# Patient Record
Sex: FEMALE | Race: ASIAN | NOT HISPANIC OR LATINO | Employment: FULL TIME | URBAN - NONMETROPOLITAN AREA
[De-identification: names, ages, dates, MRNs, and addresses within clinical notes are randomized per-mention and may not be internally consistent; named-entity substitution may affect disease eponyms.]

---

## 2017-02-27 ENCOUNTER — HOSPITAL ENCOUNTER (OUTPATIENT)
Dept: LAB | Facility: MEDICAL CENTER | Age: 52
End: 2017-02-27
Attending: INTERNAL MEDICINE
Payer: COMMERCIAL

## 2017-02-27 LAB
ALBUMIN SERPL BCP-MCNC: 3.9 G/DL (ref 3.2–4.9)
ALP SERPL-CCNC: 87 U/L (ref 30–99)
ALT SERPL-CCNC: 15 U/L (ref 2–50)
AST SERPL-CCNC: 18 U/L (ref 12–45)
BASOPHILS # BLD AUTO: 0.05 K/UL (ref 0–0.12)
BASOPHILS NFR BLD AUTO: 0.8 % (ref 0–1.8)
BILIRUB CONJ SERPL-MCNC: 0.1 MG/DL (ref 0.1–0.5)
BILIRUB INDIRECT SERPL-MCNC: 0.3 MG/DL (ref 0–1)
BILIRUB SERPL-MCNC: 0.4 MG/DL (ref 0.1–1.5)
EOSINOPHIL # BLD: 0.12 K/UL (ref 0–0.51)
EOSINOPHIL NFR BLD AUTO: 1.9 % (ref 0–6.9)
ERYTHROCYTE [DISTWIDTH] IN BLOOD BY AUTOMATED COUNT: 40.6 FL (ref 35.9–50)
HCT VFR BLD AUTO: 41.9 % (ref 37–47)
HGB BLD-MCNC: 14.4 G/DL (ref 12–16)
IMM GRANULOCYTES # BLD AUTO: 0.02 K/UL (ref 0–0.11)
IMM GRANULOCYTES NFR BLD AUTO: 0.3 % (ref 0–0.9)
INR PPP: 0.98 (ref 0.87–1.13)
LYMPHOCYTES # BLD: 2.83 K/UL (ref 1–4.8)
LYMPHOCYTES NFR BLD AUTO: 45.2 % (ref 22–41)
MCH RBC QN AUTO: 32.4 PG (ref 27–33)
MCHC RBC AUTO-ENTMCNC: 34.4 G/DL (ref 33.6–35)
MCV RBC AUTO: 94.4 FL (ref 81.4–97.8)
MONOCYTES # BLD: 0.34 K/UL (ref 0–0.85)
MONOCYTES NFR BLD AUTO: 5.4 % (ref 0–13.4)
NEUTROPHILS # BLD: 2.9 K/UL (ref 2–7.15)
NEUTROPHILS NFR BLD AUTO: 46.4 % (ref 44–72)
NRBC # BLD AUTO: 0 K/UL
NRBC BLD-RTO: 0 /100 WBC
PLATELET # BLD AUTO: 244 K/UL (ref 164–446)
PMV BLD AUTO: 10.1 FL (ref 9–12.9)
PROT SERPL-MCNC: 6.6 G/DL (ref 6–8.2)
PROTHROMBIN TIME: 13.3 SEC (ref 12–14.6)
RBC # BLD AUTO: 4.44 M/UL (ref 4.2–5.4)
WBC # BLD AUTO: 6.3 K/UL (ref 4.8–10.8)

## 2017-02-27 PROCEDURE — 85610 PROTHROMBIN TIME: CPT

## 2017-02-27 PROCEDURE — 36415 COLL VENOUS BLD VENIPUNCTURE: CPT

## 2017-02-27 PROCEDURE — 85025 COMPLETE CBC W/AUTO DIFF WBC: CPT

## 2017-02-27 PROCEDURE — 80076 HEPATIC FUNCTION PANEL: CPT

## 2017-03-09 ENCOUNTER — OFFICE VISIT (OUTPATIENT)
Dept: MEDICAL GROUP | Facility: PHYSICIAN GROUP | Age: 52
End: 2017-03-09
Payer: COMMERCIAL

## 2017-03-09 VITALS
RESPIRATION RATE: 16 BRPM | OXYGEN SATURATION: 98 % | HEART RATE: 67 BPM | DIASTOLIC BLOOD PRESSURE: 82 MMHG | TEMPERATURE: 98.8 F | WEIGHT: 115 LBS | SYSTOLIC BLOOD PRESSURE: 116 MMHG | BODY MASS INDEX: 24.81 KG/M2 | HEIGHT: 57 IN

## 2017-03-09 DIAGNOSIS — R16.0 LIVER MASS: ICD-10-CM

## 2017-03-09 DIAGNOSIS — R74.01 TRANSAMINITIS: ICD-10-CM

## 2017-03-09 DIAGNOSIS — N95.2 ATROPHIC VAGINITIS: ICD-10-CM

## 2017-03-09 DIAGNOSIS — N92.6 IRREGULAR MENSES: Chronic | ICD-10-CM

## 2017-03-09 DIAGNOSIS — Z87.39 HISTORY OF RHABDOMYOLYSIS: ICD-10-CM

## 2017-03-09 DIAGNOSIS — Z78.0 MENOPAUSE: ICD-10-CM

## 2017-03-09 PROCEDURE — 99214 OFFICE O/P EST MOD 30 MIN: CPT | Performed by: INTERNAL MEDICINE

## 2017-03-09 ASSESSMENT — PAIN SCALES - GENERAL: PAINLEVEL: NO PAIN

## 2017-03-09 NOTE — MR AVS SNAPSHOT
"        Jenna Real   3/9/2017 2:40 PM   Office Visit   MRN: [id number]    Department:  [facility]   Dept Phone:  [phone]    Description:  Female : 1965   Provider:  Hazel COFFEY M.D.           Reason for Visit     Results LABS       Allergies as of 3/9/2017     No Known Allergies      You were diagnosed with     Liver mass   [685825]       Transaminitis   [464872]       Atrophic vaginitis   [791173]       Irregular menses   [756218]       Menopause   [243657]         Vital Signs     Blood Pressure Pulse Temperature Respirations Height Weight    116/82 mmHg 67 37.1 °C (98.8 °F) 16 1.448 m (4' 9.01\") 52.164 kg (115 lb)    Body Mass Index Oxygen Saturation Smoking Status             24.88 kg/m2 98% Never Smoker          Basic Information     Date Of Birth Sex Race Ethnicity Preferred Language    1965 Female  Unknown English      Your appointments     2017  3:20 PM   Established Patient with Hazel COFFEY M.D.   Formerly [facility] (--)    [address], [place] [id number] [phone]           You will be receiving a confirmation call a few days before your appointment from our automated call confirmation system.              Problem List              ICD-10-CM Priority Class Noted - Resolved    Hemorrhoids K64.9   Unknown - Present    Allergic rhinitis J30.9   Unknown - Present    Irregular menses (Chronic) N92.6   10/8/2009 - Present    Oral thrush B37.0   11/3/2015 - Present    Rhabdomyolysis M62.82   2016 - Present    Transaminitis R74.0   2016 - Present    Abnormal urinalysis R82.90   2016 - Present    Hypokalemia E87.6   2016 - Present    Liver mass R16.0   2016 - Present    Atrophic vaginitis N95.2   3/9/2017 - Present    Menopause Z78.0   3/9/2017 - Present      Health Maintenance        Date Due Completion Dates    IMM DTaP/Tdap/Td Vaccine (1 - Tdap) 1984 ---    MAMMOGRAM 3/13/2015 3/13/2014    COLONOSCOPY " 9/14/2015 ---    IMM INFLUENZA (1) 9/1/2016 10/1/2015    PAP SMEAR 7/18/2017 7/18/2014, 3/4/2013, 12/23/2011, 10/4/2010, 9/8/2009            Current Immunizations     Influenza Vaccine Adult HD 10/1/2015      Below and/or attached are the medications your provider expects you to take. Review all of your home medications and newly ordered medications with your provider and/or pharmacist. Follow medication instructions as directed by your provider and/or pharmacist. Please keep your medication list with you and share with your provider. Update the information when medications are discontinued, doses are changed, or new medications (including over-the-counter products) are added; and carry medication information at all times in the event of emergency situations     Allergies:  No Known Allergies          Medications  Valid as of: March 09, 2017 -  3:34 PM    Generic Name Brand Name Tablet Size Instructions for use    Calcium Carbonate (Tab) CALCIUM 500 500 MG Take 1 Tab by mouth 2 times a day, with meals.        Cetirizine HCl (Tab) ZYRTEC 10 MG Take 10 mg by mouth every evening.        DiphenhydrAMINE HCl   Take  by mouth.        Estrogens, Conjugated (Cream) PREMARIN 0.625 MG/GM Apply 1 g MWF x 2 weeks then q Sunday.        Multiple Vitamins-Calcium   Take  by mouth.        .                 Medicines prescribed today were sent to:     [id number], [place] - [address] [facility] [address]    [address] [facility] [address]    Phone: [phone] Fax: [phone]    Open 24 Hours?: No      Medication refill instructions:       If your prescription bottle indicates you have medication refills left, it is not necessary to call your provider’s office. Please contact your pharmacy and they will refill your medication.    If your prescription bottle indicates you do not have any refills left, you may request refills at any time through one of the following ways: The online [employer] system  (except Urgent Care), by calling your provider’s office, or by asking your pharmacy to contact your provider’s office with a refill request. Medication refills are processed only during regular business hours and may not be available until the next business day. Your provider may request additional information or to have a follow-up visit with you prior to refilling your medication.   *Please Note: Medication refills are assigned a new Rx number when refilled electronically. Your pharmacy may indicate that no refills were authorized even though a new prescription for the same medication is available at the pharmacy. Please request the medicine by name with the pharmacy before contacting your provider for a refill.           [employer] Access Code: Activation code not generated  Current [employer] Status: Active

## 2017-03-09 NOTE — ASSESSMENT & PLAN NOTE
Patient reports heavy hot flashes at nite, using some OTC estrogen and symptoms are improved except vaginal dryness

## 2017-03-12 NOTE — PROGRESS NOTES
Chief Complaint   Patient presents with   • Results     LABS        HISTORY OF PRESENT ILLNESS: Patient is a 51 y.o. female established patient who presents today to discuss the medical issues below.    Liver mass  Patient reports she had her repeat MRI with GI and the lesion was not a problem. 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  Had labs, feels fine.  Continues to follow with GI.  No gi upset, eating well, weight stable, no abdomen pain constipation or diarrhea.     Irregular menses  S/p endometrial ablation age 45, no additional periods.     Menopause  Patient primary complaint today, reports heavy hot flashes at nite, using some OTC estrogen and symptoms are improved except vaginal dryness, especially with intercourse.  Minimal aches and pain complaints.        Patient Active Problem List    Diagnosis Date Noted   • Atrophic vaginitis 03/09/2017   • Menopause 03/09/2017   • Liver mass 06/29/2016   • Hypokalemia 06/06/2016   • Rhabdomyolysis 06/04/2016   • Transaminitis 06/04/2016   • Abnormal urinalysis 06/04/2016   • Oral thrush 11/03/2015   • Irregular menses 10/08/2009   • Hemorrhoids    • Allergic rhinitis        Allergies:Review of patient's allergies indicates no known allergies.    Current Outpatient Prescriptions   Medication Sig Dispense Refill   • conjugated estrogen (PREMARIN) 0.625 MG/GM Cream Apply 1 g MWF x 2 weeks then q Sunday. 1 Tube 5   • Multiple Vitamins-Calcium (ONE-A-DAY WOMENS PO) Take  by mouth.     • DiphenhydrAMINE HCl (BENADRYL ALLERGY PO) Take  by mouth.     • CALCIUM 500 500 MG TABS Take 1 Tab by mouth 2 times a day, with meals.     • ZYRTEC 10 MG TABS Take 10 mg by mouth every evening.       No current facility-administered medications for this visit.         Past Medical History   Diagnosis Date   • Unspecified hemorrhoids without mention of complication    • Rosacea      sees dermatologist in [place]   • Varicose vein    • Pap  "smear 8/18/08   • Allergic rhinitis, cause unspecified    • Irregular menses 10/8/2009   • Vaginitis 4/15/2015   • Oral thrush 11/3/2015   • Liver mass 6/29/2016   • Atrophic vaginitis 3/9/2017   • Menopause 3/9/2017       Social History   Substance Use Topics   • Smoking status: Never Smoker    • Smokeless tobacco: Never Used   • Alcohol Use: No       Family Status   Relation Status Death Age   • Mother Alive      had a nervous breakdown otherwise healthy   • Father Alive      healthy   History reviewed. No pertinent family history.    ROS:    Respiratory: Negative for cough, sputum production, shortness of breath or wheezing.    Cardiovascular: Negative for chest pain, palpitations, orthopnea, dyspnea with exertion or edema.   Gastrointestinal: Negative for GI upset, nausea, vomiting, abdominal pain, constipation or diarrhea.   Genitourinary: Negative for dysuria, urgency, hesitancy or frequency.       Exam:    Blood pressure 116/82, pulse 67, temperature 37.1 °C (98.8 °F), resp. rate 16, height 1.448 m (4' 9.01\"), weight 52.164 kg (115 lb), SpO2 98 %.  General:  Well nourished, well developed female in NAD.  HENT: Normocephalic, bilateral TMs are intact, nasal and oral mucosa with no lesions,   Neck: Supple without bruit. Thyroid is not enlarged.  Pulmonary: Clear to ausculation and percussion.  Normal effort. No rales, rhonchi, or wheezing.  Cardiovascular: Regular rate and rhythm without murmur.   Abdomen: Normal bowel sounds soft and nontender no palpable liver spleen bladder mass.  Extremities: No LE edema noted.  Neuro: Grossly nonfocal.  Psych: Alert and oriented to person, place, and time. Appropriate mood and conversation.    LABS: Results reviewed and discussed with the patient, questions answered.    This dictation was created using voice recognition software. I have made reasonable attempts to correct errors, however, errors of grammar and content may exist.          Assessment/Plan:    1. Liver " mass  Determined c/w hemangioma, following with GI.     2. Transaminitis  Resolved, felt to be ischemic after Rhabdomyalisis, clinically stable.     3. Atrophic vaginitis  Discussed options, trial topical estrogen cream, pelvic exam if persisting.     4. Irregular menses  S/p ablation, no bleeding.    5. Menopause  Reviewed atropic vaginitis in setting of probable menopause, options for therapy. She is currently using OTC homeopathic estrogen preps.  Opts for topical estrogen trial, lowest dose estrogen therapy discussed. Consider progestin if higher doses required or remains on rx estrogens.  Patient s/p ablation, risk of endometrial CA less.      Patient was seen for 30 minutes face to face of which more than 50% of the time was spent in counseling and coordination of care regarding the above problems.

## 2017-03-12 NOTE — ASSESSMENT & PLAN NOTE
No additional episodes of myositis.  Rhabdomyolysis felt to be due to heavy work out, no other etiology found.

## 2017-06-30 ENCOUNTER — OFFICE VISIT (OUTPATIENT)
Dept: MEDICAL GROUP | Facility: PHYSICIAN GROUP | Age: 52
End: 2017-06-30
Payer: COMMERCIAL

## 2017-06-30 VITALS
HEIGHT: 58 IN | DIASTOLIC BLOOD PRESSURE: 60 MMHG | WEIGHT: 118 LBS | SYSTOLIC BLOOD PRESSURE: 90 MMHG | HEART RATE: 72 BPM | OXYGEN SATURATION: 98 % | TEMPERATURE: 98.3 F | BODY MASS INDEX: 24.77 KG/M2

## 2017-06-30 DIAGNOSIS — Z23 NEED FOR TDAP VACCINATION: ICD-10-CM

## 2017-06-30 DIAGNOSIS — R16.0 LIVER MASS: ICD-10-CM

## 2017-06-30 DIAGNOSIS — R74.01 TRANSAMINITIS: ICD-10-CM

## 2017-06-30 DIAGNOSIS — Z78.0 MENOPAUSE: ICD-10-CM

## 2017-06-30 DIAGNOSIS — N95.2 ATROPHIC VAGINITIS: ICD-10-CM

## 2017-06-30 DIAGNOSIS — N92.6 IRREGULAR MENSES: Chronic | ICD-10-CM

## 2017-06-30 PROCEDURE — 90471 IMMUNIZATION ADMIN: CPT | Performed by: INTERNAL MEDICINE

## 2017-06-30 PROCEDURE — 99213 OFFICE O/P EST LOW 20 MIN: CPT | Mod: 25 | Performed by: INTERNAL MEDICINE

## 2017-06-30 PROCEDURE — 90715 TDAP VACCINE 7 YRS/> IM: CPT | Performed by: INTERNAL MEDICINE

## 2017-06-30 ASSESSMENT — PATIENT HEALTH QUESTIONNAIRE - PHQ9: CLINICAL INTERPRETATION OF PHQ2 SCORE: 0

## 2017-06-30 ASSESSMENT — PAIN SCALES - GENERAL: PAINLEVEL: NO PAIN

## 2017-06-30 NOTE — PROGRESS NOTES
Chief Complaint   Patient presents with   • Follow-Up       HISTORY OF PRESENT ILLNESS: Patient is a 51 y.o. female established patient who presents today to discuss the medical issues below.    Menopause   History of endometrial ablation. Age 46 continues on estrogen replacement as topical cream she is utilizing one time a week.     Transaminitis  Resolved, had consult with GI at that time, MRI positive for hemangioma only.      Atrophic vaginitis  Controlled with topical estrogen, some intermittent dryness only.       Patient Active Problem List    Diagnosis Date Noted   • Transaminitis 06/04/2016     Priority: High   • Atrophic vaginitis 03/09/2017   • Menopause 03/09/2017   • Liver mass 06/29/2016   • Hypokalemia 06/06/2016   • History of rhabdomyolysis 06/04/2016   • Abnormal urinalysis 06/04/2016   • Oral thrush 11/03/2015   • Hemorrhoids    • Allergic rhinitis        Allergies:Review of patient's allergies indicates no known allergies.    Current Outpatient Prescriptions   Medication Sig Dispense Refill   • conjugated estrogen (PREMARIN) 0.625 MG/GM Cream Apply 1 g MWF x 2 weeks then q Sunday. 1 Tube 5   • Multiple Vitamins-Calcium (ONE-A-DAY WOMENS PO) Take  by mouth.     • DiphenhydrAMINE HCl (BENADRYL ALLERGY PO) Take  by mouth.     • CALCIUM 500 500 MG TABS Take 1 Tab by mouth 2 times a day, with meals.     • ZYRTEC 10 MG TABS Take 10 mg by mouth every evening.       No current facility-administered medications for this visit.         Past Medical History   Diagnosis Date   • Unspecified hemorrhoids without mention of complication    • Rosacea      sees dermatologist in [place]   • Varicose vein    • Pap smear 8/18/08   • Allergic rhinitis, cause unspecified    • Irregular menses 10/8/2009   • Vaginitis 4/15/2015   • Oral thrush 11/3/2015   • Liver mass 6/29/2016   • Atrophic vaginitis 3/9/2017   • Menopause 3/9/2017   • History of rhabdomyolysis 6/4/2016       Social History   Substance Use Topics   •  "Smoking status: Never Smoker    • Smokeless tobacco: Never Used   • Alcohol Use: No       Family Status   Relation Status Death Age   • Mother Alive      had a nervous breakdown otherwise healthy   • Father Alive      healthy   History reviewed. No pertinent family history.    ROS:    Respiratory: Negative for cough, sputum production, shortness of breath or wheezing.    Cardiovascular: Negative for chest pain, palpitations, orthopnea, dyspnea with exertion or edema.   Gastrointestinal: Negative for GI upset, nausea, vomiting, abdominal pain, constipation or diarrhea.   Genitourinary: Negative for dysuria, urgency, hesitancy or frequency.       Exam:    Blood pressure 90/60, pulse 72, temperature 36.8 °C (98.3 °F), height 1.473 m (4' 9.99\"), weight 53.524 kg (118 lb), SpO2 98 %.  General:  Well nourished, well developed female in NAD.  Pulmonary: Clear to ausculation and percussion.  Normal effort. No rales, rhonchi, or wheezing.  Cardiovascular: Regular rate and rhythm without murmur.   Abdomen: Normal bowel sounds soft and nontender no palpable liver spleen bladder mass.  Extremities: No LE edema noted.  Neuro: Grossly nonfocal.  Psych: Alert and oriented to person, place, and time. Appropriate mood and conversation.    LABS: Results reviewed and discussed with the patient, questions answered.    This dictation was created using voice recognition software. I have made reasonable attempts to correct errors, however, errors of grammar and content may exist.          Assessment/Plan:    1. Irregular menses  This is resolved since her ablation will resolve on medical record    2. Menopause  As discussed continuing on topical estrogen only complaints of night sweats muscular aches pains she is having a minimal amount of atrophic dryness discussed below. Patient is having annual Pap smears but this is not until next month will reschedule for follow-up    3. Transaminitis  Had resolved at last evaluation continue ongoing " annual evaluation.    4. Liver mass  MRI positive for hemangioma only no ongoing monitoring required    5. Atrophic vaginitis  Patient with minimal discomfort utilizing 1 g weekly increase to 2 times a week monitor    6. Need for Tdap vaccination  - TDAP VACCINE =>6YO IM       Patient was seen for  20 minutes face to face of which more than 50% of the time was spent in counseling and coordination of care regarding the above problems.

## 2017-06-30 NOTE — MR AVS SNAPSHOT
"Jenna Real   2017 3:30 PM   Office Visit   MRN: [id number]    Department:  [facility]   Dept Phone:  [phone]    Description:  Female : 1965   Provider:  Hazel COFFEY M.D.           Reason for Visit     Follow-Up           Allergies as of 2017     No Known Allergies      You were diagnosed with     Irregular menses   [978050]       Menopause   [334553]       Transaminitis   [259223]       Liver mass   [686285]       Atrophic vaginitis   [628960]       Need for Tdap vaccination   [342754]         Vital Signs     Blood Pressure Pulse Temperature Height Weight Body Mass Index    90/60 mmHg 72 36.8 °C (98.3 °F) 1.473 m (4' 9.99\") 53.524 kg (118 lb) 24.67 kg/m2    Oxygen Saturation Smoking Status                98% Never Smoker           Basic Information     Date Of Birth Sex Race Ethnicity Preferred Language    1965 Female  Unknown English      Your appointments     Oct 11, 2017  3:30 PM   Established Patient with Hazel COFFEY M.D.   [facility] (--)    [address] [id number] [phone]           You will be receiving a confirmation call a few days before your appointment from our automated call confirmation system.              Problem List              ICD-10-CM Priority Class Noted - Resolved    Hemorrhoids K64.9   Unknown - Present    Allergic rhinitis J30.9   Unknown - Present    Oral thrush B37.0   11/3/2015 - Present    History of rhabdomyolysis Z87.39   2016 - Present    Transaminitis R74.0 High  2016 - Present    Abnormal urinalysis R82.90   2016 - Present    Hypokalemia E87.6   2016 - Present    Liver mass R16.0   2016 - Present    Atrophic vaginitis N95.2   3/9/2017 - Present    Menopause Z78.0   3/9/2017 - Present      Health Maintenance        Date Due Completion Dates    IMM DTaP/Tdap/Td Vaccine (1 - Tdap) 1984 ---    MAMMOGRAM 3/13/2015 3/13/2014    PAP SMEAR 2017 " 7/18/2014, 3/4/2013, 12/23/2011, 10/4/2010, 9/8/2009    COLONOSCOPY 10/22/2018 10/22/2008            Current Immunizations     Hepatitis B Vaccine Recombivax (Adol/Adult) 10/15/1997, 12/10/1996, 10/23/1996    Influenza Vaccine Adult HD 10/1/2015    TD Vaccine 10/15/1997, 2/12/1997, 12/10/1996    Tdap Vaccine  Incomplete      Below and/or attached are the medications your provider expects you to take. Review all of your home medications and newly ordered medications with your provider and/or pharmacist. Follow medication instructions as directed by your provider and/or pharmacist. Please keep your medication list with you and share with your provider. Update the information when medications are discontinued, doses are changed, or new medications (including over-the-counter products) are added; and carry medication information at all times in the event of emergency situations     Allergies:  No Known Allergies          Medications  Valid as of: June 30, 2017 -  4:17 PM    Generic Name Brand Name Tablet Size Instructions for use    Calcium Carbonate (Tab) CALCIUM 500 500 MG Take 1 Tab by mouth 2 times a day, with meals.        Cetirizine HCl (Tab) ZYRTEC 10 MG Take 10 mg by mouth every evening.        DiphenhydrAMINE HCl   Take  by mouth.        Estrogens, Conjugated (Cream) PREMARIN 0.625 MG/GM Apply 1 g MWF x 2 weeks then q Sunday.        Multiple Vitamins-Calcium   Take  by mouth.        .                 Medicines prescribed today were sent to:     [id number] [place] - [address] [facility] [address]    [address] [facility] [address]    Phone: [phone] Fax: [phone]    Open 24 Hours?: No      Medication refill instructions:       If your prescription bottle indicates you have medication refills left, it is not necessary to call your provider’s office. Please contact your pharmacy and they will refill your medication.    If your prescription bottle indicates you do not have  any refills left, you may request refills at any time through one of the following ways: The online [employer] system (except Urgent Care), by calling your provider’s office, or by asking your pharmacy to contact your provider’s office with a refill request. Medication refills are processed only during regular business hours and may not be available until the next business day. Your provider may request additional information or to have a follow-up visit with you prior to refilling your medication.   *Please Note: Medication refills are assigned a new Rx number when refilled electronically. Your pharmacy may indicate that no refills were authorized even though a new prescription for the same medication is available at the pharmacy. Please request the medicine by name with the pharmacy before contacting your provider for a refill.           [employer] Access Code: Activation code not generated  Current [employer] Status: Active

## 2017-10-11 ENCOUNTER — HOSPITAL ENCOUNTER (OUTPATIENT)
Facility: MEDICAL CENTER | Age: 52
End: 2017-10-11
Attending: INTERNAL MEDICINE
Payer: COMMERCIAL

## 2017-10-11 ENCOUNTER — OFFICE VISIT (OUTPATIENT)
Dept: MEDICAL GROUP | Facility: PHYSICIAN GROUP | Age: 52
End: 2017-10-11
Payer: COMMERCIAL

## 2017-10-11 VITALS
OXYGEN SATURATION: 97 % | DIASTOLIC BLOOD PRESSURE: 60 MMHG | WEIGHT: 114 LBS | BODY MASS INDEX: 23.93 KG/M2 | HEIGHT: 58 IN | RESPIRATION RATE: 16 BRPM | SYSTOLIC BLOOD PRESSURE: 100 MMHG | HEART RATE: 68 BPM | TEMPERATURE: 98.7 F

## 2017-10-11 DIAGNOSIS — N95.2 ATROPHIC VAGINITIS: ICD-10-CM

## 2017-10-11 DIAGNOSIS — L90.0 LICHEN SCLEROSUS ET ATROPHICUS: ICD-10-CM

## 2017-10-11 DIAGNOSIS — Z12.4 CERVICAL CANCER SCREENING: ICD-10-CM

## 2017-10-11 DIAGNOSIS — Z23 NEEDS FLU SHOT: ICD-10-CM

## 2017-10-11 DIAGNOSIS — Z12.39 BREAST CANCER SCREENING: ICD-10-CM

## 2017-10-11 PROCEDURE — 90471 IMMUNIZATION ADMIN: CPT | Performed by: INTERNAL MEDICINE

## 2017-10-11 PROCEDURE — 87624 HPV HI-RISK TYP POOLED RSLT: CPT

## 2017-10-11 PROCEDURE — 99396 PREV VISIT EST AGE 40-64: CPT | Mod: 25 | Performed by: INTERNAL MEDICINE

## 2017-10-11 PROCEDURE — 90686 IIV4 VACC NO PRSV 0.5 ML IM: CPT | Performed by: INTERNAL MEDICINE

## 2017-10-11 PROCEDURE — 88175 CYTOPATH C/V AUTO FLUID REDO: CPT

## 2017-10-11 NOTE — ASSESSMENT & PLAN NOTE
Patient reports saw gyne, differential includes the lichen sclerosis, was not given biopsy.  Has been using the 1 % hydrocortisone for 2 weeks and states no more itching burning or plaques. She is due for Pap smear no vaginal discharge.

## 2017-10-11 NOTE — PROGRESS NOTES
Chief Complaint   Patient presents with   • Gynecologic Exam     PAP        HISTORY OF PRESENT ILLNESS: Patient is a 52 y.o. female established patient who presents today to discuss the medical issues below.    Atrophic vaginitis  Patient reports saw gyne, differential includes the lichen sclerosis, was not given biopsy.  Has been using the 1 % hydrocortisone for 2 weeks and states no more itching burning or plaques. She is due for Pap smear no vaginal discharge.      Patient Active Problem List    Diagnosis Date Noted   • Transaminitis 06/04/2016     Priority: High   • Lichen sclerosus et atrophicus 10/11/2017   • Atrophic vaginitis 03/09/2017   • Menopause 03/09/2017   • Liver mass 06/29/2016   • Hypokalemia 06/06/2016   • History of rhabdomyolysis 06/04/2016   • Abnormal urinalysis 06/04/2016   • Oral thrush 11/03/2015   • Hemorrhoids    • Allergic rhinitis        Allergies:Review of patient's allergies indicates no known allergies.    Current Outpatient Prescriptions   Medication Sig Dispense Refill   • conjugated estrogen (PREMARIN) 0.625 MG/GM Cream Apply 1 g MWF x 2 weeks then q Sunday. 1 Tube 5   • Multiple Vitamins-Calcium (ONE-A-DAY WOMENS PO) Take  by mouth.     • CALCIUM 500 500 MG TABS Take 1 Tab by mouth 2 times a day, with meals.     • DiphenhydrAMINE HCl (BENADRYL ALLERGY PO) Take  by mouth.     • ZYRTEC 10 MG TABS Take 10 mg by mouth every evening.       No current facility-administered medications for this visit.          Past Medical History:   Diagnosis Date   • Atrophic vaginitis 3/9/2017   • Menopause 3/9/2017   • Liver mass 6/29/2016   • History of rhabdomyolysis 6/4/2016   • Oral thrush 11/3/2015   • Vaginitis 4/15/2015   • Irregular menses 10/8/2009   • Pap smear 8/18/08   • Allergic rhinitis, cause unspecified    • Rosacea     sees dermatologist in [place]   • Unspecified hemorrhoids without mention of complication    • Varicose vein        Social History   Substance Use Topics   • Smoking  "status: Never Smoker   • Smokeless tobacco: Never Used   • Alcohol use No       Family Status   Relation Status   • Mother Alive    had a nervous breakdown otherwise healthy   • Father Alive    healthy   History reviewed. No pertinent family history.    ROS:    Respiratory: Negative for cough, sputum production, shortness of breath or wheezing.    Cardiovascular: Negative for chest pain, palpitations, orthopnea, dyspnea with exertion or edema.   Gastrointestinal: Negative for GI upset, nausea, vomiting, abdominal pain, constipation or diarrhea.   Genitourinary: Negative for dysuria, urgency, hesitancy or frequency.       Exam:    Blood pressure 100/60, pulse 68, temperature 37.1 °C (98.7 °F), resp. rate 16, height 1.473 m (4' 10\"), weight 51.7 kg (114 lb), SpO2 97 %.  General:  Well nourished, well developed female in NAD.  Pulmonary: Clear to ausculation and percussion.  Normal effort. No rales, rhonchi, or wheezing.  Cardiovascular: Regular rate and rhythm without murmur.   Abdomen: Normal bowel sounds soft and nontender no palpable liver spleen bladder mass.  : normal external female genitalia, entroitus sans lesions, cervix visualized with no abnormal findings, pap smear is taken, on bimanual exam utereus of normal size, no masses or tenderness.      Extremities: No LE edema noted.  Neuro: Grossly nonfocal.  Psych: Alert and oriented to person, place, and time. Appropriate mood and conversation.        This dictation was created using voice recognition software. I have made reasonable attempts to correct errors, however, errors of grammar and content may exist.          Assessment/Plan:    1. Atrophic vaginitis  Stable currently    2 Lichen sclerosus  Resolved clinically with topical steroids. Discussed moisturizing measures. Ongoing monitoring.    3 Annual wellness exam  Cervical cancer screening discussed Pap smear taken.    2. Needs flu shot    - INFLUENZA VACCINE QUAD INJ >3Y(PF)    3. Breast cancer " screening    - MA-SCREEN MAMMO W/CAD-BILAT    4. Cervical cancer screening    - THINPREP PAP WITH HPV; Future    5. Lichen sclerosus et atrophicus

## 2017-10-12 DIAGNOSIS — Z12.4 CERVICAL CANCER SCREENING: ICD-10-CM

## 2017-10-13 LAB
CYTOLOGY REG CYTOL: NORMAL
HPV HR 12 DNA CVX QL NAA+PROBE: NEGATIVE
HPV16 DNA SPEC QL NAA+PROBE: NEGATIVE
HPV18 DNA SPEC QL NAA+PROBE: NEGATIVE
SPECIMEN SOURCE: NORMAL

## 2017-10-31 ENCOUNTER — TELEPHONE (OUTPATIENT)
Dept: MEDICAL GROUP | Facility: PHYSICIAN GROUP | Age: 52
End: 2017-10-31

## 2018-03-21 ENCOUNTER — OFFICE VISIT (OUTPATIENT)
Dept: MEDICAL GROUP | Facility: PHYSICIAN GROUP | Age: 53
End: 2018-03-21
Payer: COMMERCIAL

## 2018-03-21 VITALS
HEIGHT: 57 IN | TEMPERATURE: 98.7 F | RESPIRATION RATE: 16 BRPM | SYSTOLIC BLOOD PRESSURE: 110 MMHG | BODY MASS INDEX: 24.81 KG/M2 | HEART RATE: 59 BPM | WEIGHT: 115 LBS | DIASTOLIC BLOOD PRESSURE: 70 MMHG | OXYGEN SATURATION: 98 %

## 2018-03-21 DIAGNOSIS — N95.2 ATROPHIC VAGINITIS: ICD-10-CM

## 2018-03-21 DIAGNOSIS — Z13.220 LIPID SCREENING: ICD-10-CM

## 2018-03-21 DIAGNOSIS — E55.9 VITAMIN D DEFICIENCY: ICD-10-CM

## 2018-03-21 DIAGNOSIS — K64.9 HEMORRHOIDS, UNSPECIFIED HEMORRHOID TYPE: ICD-10-CM

## 2018-03-21 DIAGNOSIS — R74.01 TRANSAMINITIS: ICD-10-CM

## 2018-03-21 DIAGNOSIS — R16.0 LIVER MASS: ICD-10-CM

## 2018-03-21 DIAGNOSIS — L90.0 LICHEN SCLEROSUS ET ATROPHICUS: ICD-10-CM

## 2018-03-21 PROCEDURE — 99214 OFFICE O/P EST MOD 30 MIN: CPT | Performed by: INTERNAL MEDICINE

## 2018-03-21 RX ORDER — CIPROFLOXACIN 500 MG/1
500 TABLET, FILM COATED ORAL 2 TIMES DAILY
Qty: 20 TAB | Refills: 0 | Status: SHIPPED | OUTPATIENT
Start: 2018-03-21 | End: 2020-04-06

## 2018-03-21 NOTE — PROGRESS NOTES
Chief Complaint   Patient presents with   • Vaginitis     med refill        HISTORY OF PRESENT ILLNESS: Patient is a 52 y.o. female established patient who presents today to discuss the medical issues below.    Liver mass  Not following with GI, all labs normal and MRI c/w hemantioma, no follow up scheduled.      Lichen sclerosus et atrophicus  Patient states she is using the OTC cortisone and states fine.      Atrophic vaginitis  patient requesting refill on the premarin cream.  Current with pap smears.  She is having no vaginal bleeding.  Currently using 1 x a week.     Hemorrhoids  Patient reports she had colonoscopy when age 45, dx hemorrhoids only.       Patient Active Problem List    Diagnosis Date Noted   • Transaminitis 06/04/2016     Priority: High   • Lichen sclerosus et atrophicus 10/11/2017   • Atrophic vaginitis 03/09/2017   • Menopause 03/09/2017   • Liver mass 06/29/2016   • Hypokalemia 06/06/2016   • History of rhabdomyolysis 06/04/2016   • Abnormal urinalysis 06/04/2016   • Oral thrush 11/03/2015   • Hemorrhoids    • Allergic rhinitis        Allergies:Patient has no known allergies.    Current Outpatient Prescriptions   Medication Sig Dispense Refill   • ciprofloxacin (CIPRO) 500 MG Tab Take 1 Tab by mouth 2 times a day. 20 Tab 0   • conjugated estrogen (PREMARIN) 0.625 MG/GM Cream Apply 1 g MWF x 2 weeks then q Sunday. 1 Tube 5   • Multiple Vitamins-Calcium (ONE-A-DAY WOMENS PO) Take  by mouth.     • CALCIUM 500 500 MG TABS Take 1 Tab by mouth 2 times a day, with meals.     • DiphenhydrAMINE HCl (BENADRYL ALLERGY PO) Take  by mouth.     • ZYRTEC 10 MG TABS Take 10 mg by mouth every evening.       No current facility-administered medications for this visit.          Past Medical History:   Diagnosis Date   • Allergic rhinitis, cause unspecified    • Atrophic vaginitis 3/9/2017   • History of rhabdomyolysis 6/4/2016   • Irregular menses 10/8/2009   • Liver mass 6/29/2016   • Menopause 3/9/2017   •  "Oral thrush 11/3/2015   • Pap smear 8/18/08   • Rosacea     sees dermatologist in [place]   • Unspecified hemorrhoids without mention of complication    • Vaginitis 4/15/2015   • Varicose vein        Social History   Substance Use Topics   • Smoking status: Never Smoker   • Smokeless tobacco: Never Used   • Alcohol use No       Family Status   Relation Status   • Mother Alive    had a nervous breakdown otherwise healthy   • Father Alive    healthy   History reviewed. No pertinent family history.    ROS:    Respiratory: Negative for cough, sputum production, shortness of breath or wheezing.    Cardiovascular: Negative for chest pain, palpitations, orthopnea, dyspnea with exertion or edema.   Gastrointestinal: Negative for GI upset, nausea, vomiting, abdominal pain, constipation or diarrhea.   Genitourinary: Negative for dysuria, urgency, hesitancy or frequency.       Exam:    Blood pressure 110/70, pulse (!) 59, temperature 37.1 °C (98.7 °F), resp. rate 16, height 1.448 m (4' 9\"), weight 52.2 kg (115 lb), SpO2 98 %.  General:  Well nourished, well developed female in NAD.  HENT: Normocephalic, bilateral TMs are intact, nasal and oral mucosa with no lesions,   Neck: Supple without bruit. Thyroid is not enlarged.  Pulmonary: Clear to ausculation and percussion.  Normal effort. No rales, rhonchi, or wheezing.  Cardiovascular: Regular rate and rhythm without murmur.   Abdomen: Normal bowel sounds soft and nontender no palpable liver spleen bladder mass.  Extremities: No LE edema noted.  Neuro: Grossly nonfocal.  Psych: Alert and oriented to person, place, and time. Appropriate mood and conversation.    LABS: Results reviewed and discussed with the patient, questions answered.      This dictation was created using voice recognition software. I have made reasonable attempts to correct errors, however, errors of grammar and content may exist.          Assessment/Plan:    1. Liver mass  Will resolve this problem patient had " workup by GI felt to be benign lesions    2. Lichen sclerosus et atrophicus  Patient states currently stable with over-the-counter hydrocortisone    3. Atrophic vaginitis  Requesting refill on estrogen reviewed again risks benefits refills written she is current on Pap  - conjugated estrogen (PREMARIN) 0.625 MG/GM Cream; Apply 1 g MWF x 2 weeks then q Sunday.  Dispense: 1 Tube; Refill: 5    4. Hemorrhoids, unspecified hemorrhoid type  Managing with conservative treatment    5. Transaminitis  Has been resolved ongoing annual monitoring recommended  - COMP METABOLIC PANEL; Future  - CBC WITH DIFFERENTIAL; Future    6. Lipid screening  Annual lipid screening reviewed and recommended  - LIPID PROFILE; Future    7. Vitamin D deficiency  Taking over-the-counter vitamin supplements check vitamin D levels  - VITAMIN D,25 HYDROXY; Future    Patient was seen for  25 minutes face to face of which more than 50% of the time was spent in counseling and coordination of care regarding the above problems.

## 2018-03-21 NOTE — ASSESSMENT & PLAN NOTE
patient requesting refill on the premarin cream.  Current with pap smears.  She is having no vaginal bleeding.  Currently using 1 x a week.

## 2018-08-24 ENCOUNTER — OFFICE VISIT (OUTPATIENT)
Dept: URGENT CARE | Facility: PHYSICIAN GROUP | Age: 53
End: 2018-08-24
Payer: COMMERCIAL

## 2018-08-24 VITALS
OXYGEN SATURATION: 96 % | WEIGHT: 116 LBS | DIASTOLIC BLOOD PRESSURE: 60 MMHG | RESPIRATION RATE: 16 BRPM | TEMPERATURE: 98.3 F | BODY MASS INDEX: 25.03 KG/M2 | HEART RATE: 71 BPM | HEIGHT: 57 IN | SYSTOLIC BLOOD PRESSURE: 116 MMHG

## 2018-08-24 DIAGNOSIS — R21 RASH OF NECK: ICD-10-CM

## 2018-08-24 PROCEDURE — 99214 OFFICE O/P EST MOD 30 MIN: CPT | Performed by: PHYSICIAN ASSISTANT

## 2018-08-24 RX ORDER — TRIAMCINOLONE ACETONIDE 1 MG/G
1 CREAM TOPICAL 2 TIMES DAILY
Qty: 1 TUBE | Refills: 0 | Status: SHIPPED | OUTPATIENT
Start: 2018-08-24 | End: 2020-11-11

## 2018-08-24 NOTE — PROGRESS NOTES
Chief Complaint   Patient presents with   • Itching     x1mon brusing on R side neck       HISTORY OF PRESENT ILLNESS: Patient is a 52 y.o. female who presents today because she has had an itchy rash on both sides of her neck over the last month.  She is not sure what caused it.  No new soaps, lotions, detergents or contacts.  The rash is discolored, itchy, dry.  He has tried Benadryl cream as well as hydrating lotions without improvement    Patient Active Problem List    Diagnosis Date Noted   • Transaminitis 06/04/2016     Priority: High   • Lichen sclerosus et atrophicus 10/11/2017   • Atrophic vaginitis 03/09/2017   • Menopause 03/09/2017   • Liver mass 06/29/2016   • Hypokalemia 06/06/2016   • History of rhabdomyolysis 06/04/2016   • Abnormal urinalysis 06/04/2016   • Oral thrush 11/03/2015   • Hemorrhoids    • Allergic rhinitis        Allergies:Patient has no known allergies.    Current Outpatient Prescriptions Ordered in [facility]   Medication Sig Dispense Refill   • triamcinolone acetonide (KENALOG) 0.1 % Cream Apply 1 Application to affected area(s) 2 times a day. 1 Tube 0   • conjugated estrogen (PREMARIN) 0.625 MG/GM Cream Apply 1 g MWF x 2 weeks then q Sunday. 1 Tube 5   • Multiple Vitamins-Calcium (ONE-A-DAY WOMENS PO) Take  by mouth.     • DiphenhydrAMINE HCl (BENADRYL ALLERGY PO) Take  by mouth.     • CALCIUM 500 500 MG TABS Take 1 Tab by mouth 2 times a day, with meals.     • ZYRTEC 10 MG TABS Take 10 mg by mouth every evening.     • ciprofloxacin (CIPRO) 500 MG Tab Take 1 Tab by mouth 2 times a day. 20 Tab 0     No current Epic-ordered facility-administered medications on file.        Past Medical History:   Diagnosis Date   • Allergic rhinitis, cause unspecified    • Atrophic vaginitis 3/9/2017   • History of rhabdomyolysis 6/4/2016   • Irregular menses 10/8/2009   • Liver mass 6/29/2016   • Menopause 3/9/2017   • Oral thrush 11/3/2015   • Pap smear 8/18/08   • Rosacea     sees dermatologist in [place]   •  "Unspecified hemorrhoids without mention of complication    • Vaginitis 4/15/2015   • Varicose vein        Social History   Substance Use Topics   • Smoking status: Never Smoker   • Smokeless tobacco: Never Used   • Alcohol use No       Family Status   Relation Status   • Mo Alive        had a nervous breakdown otherwise healthy   • Fa Alive        healthy   History reviewed. No pertinent family history.    ROS:  Review of Systems   Constitutional: Negative for fever, chills, weight loss and malaise/fatigue.   HENT: Negative for ear pain, nosebleeds, congestion, sore throat and neck pain.    Eyes: Negative for blurred vision.   Respiratory: Negative for cough, sputum production, shortness of breath and wheezing.    Cardiovascular: Negative for chest pain, palpitations, orthopnea and leg swelling.   Gastrointestinal: Negative for heartburn, nausea, vomiting and abdominal pain.   Genitourinary: Negative for dysuria, urgency and frequency.     Exam:  Blood pressure 116/60, pulse 71, temperature 36.8 °C (98.3 °F), resp. rate 16, height 1.448 m (4' 9.01\"), weight 52.6 kg (116 lb), SpO2 96 %, not currently breastfeeding.  General:  Well nourished, well developed female in NAD  Head:Normocephalic, atraumatic  Eyes: PERRLA, EOM within normal limits, no conjunctival injection, no scleral icterus, visual fields and acuity grossly intact.  Nose: Symmetrical without tenderness, no discharge.  Mouth: reasonable hygiene, no erythema exudates or tonsillar enlargement.  Neck: no masses, range of motion within normal limits, no tracheal deviation. No obvious thyroid enlargement.  Extremities: no clubbing, cyanosis, or edema.  Skin: On either side of her neck, she has a dry linear streaking rash with linear streaks measuring approximately 1 cm in length.  It has ecchymotic discoloration.  No induration    Please note that this dictation was created using voice recognition software. I have made every reasonable attempt to correct " obvious errors, but I expect that there are errors of grammar and possibly content that I did not discover before finalizing the note.    Assessment/Plan:  1. Rash of neck  CBC WITH DIFFERENTIAL    COMP METABOLIC PANEL    TSH    PROTHROMBIN TIME    triamcinolone acetonide (KENALOG) 0.1 % Cream       Followup with primary care in the next 7-10 days if not significantly improving, return to the urgent care or go to the emergency room sooner for any worsening of symptoms.

## 2018-08-27 ENCOUNTER — HOSPITAL ENCOUNTER (OUTPATIENT)
Dept: LAB | Facility: MEDICAL CENTER | Age: 53
End: 2018-08-27
Attending: PHYSICIAN ASSISTANT
Payer: COMMERCIAL

## 2018-08-27 DIAGNOSIS — R21 RASH OF NECK: ICD-10-CM

## 2018-08-27 LAB
ALBUMIN SERPL BCP-MCNC: 4.3 G/DL (ref 3.2–4.9)
ALBUMIN/GLOB SERPL: 1.5 G/DL
ALP SERPL-CCNC: 88 U/L (ref 30–99)
ALT SERPL-CCNC: 22 U/L (ref 2–50)
ANION GAP SERPL CALC-SCNC: 9 MMOL/L (ref 0–11.9)
AST SERPL-CCNC: 23 U/L (ref 12–45)
BASOPHILS # BLD AUTO: 0.7 % (ref 0–1.8)
BASOPHILS # BLD: 0.04 K/UL (ref 0–0.12)
BILIRUB SERPL-MCNC: 0.4 MG/DL (ref 0.1–1.5)
BUN SERPL-MCNC: 18 MG/DL (ref 8–22)
CALCIUM SERPL-MCNC: 9.7 MG/DL (ref 8.5–10.5)
CHLORIDE SERPL-SCNC: 106 MMOL/L (ref 96–112)
CO2 SERPL-SCNC: 26 MMOL/L (ref 20–33)
CREAT SERPL-MCNC: 0.92 MG/DL (ref 0.5–1.4)
EOSINOPHIL # BLD AUTO: 0.18 K/UL (ref 0–0.51)
EOSINOPHIL NFR BLD: 3.1 % (ref 0–6.9)
ERYTHROCYTE [DISTWIDTH] IN BLOOD BY AUTOMATED COUNT: 41.1 FL (ref 35.9–50)
GLOBULIN SER CALC-MCNC: 2.8 G/DL (ref 1.9–3.5)
GLUCOSE SERPL-MCNC: 90 MG/DL (ref 65–99)
HCT VFR BLD AUTO: 42.2 % (ref 37–47)
HGB BLD-MCNC: 14.5 G/DL (ref 12–16)
IMM GRANULOCYTES # BLD AUTO: 0.01 K/UL (ref 0–0.11)
IMM GRANULOCYTES NFR BLD AUTO: 0.2 % (ref 0–0.9)
INR PPP: 0.96 (ref 0.87–1.13)
LYMPHOCYTES # BLD AUTO: 2.69 K/UL (ref 1–4.8)
LYMPHOCYTES NFR BLD: 46.9 % (ref 22–41)
MCH RBC QN AUTO: 31.8 PG (ref 27–33)
MCHC RBC AUTO-ENTMCNC: 34.4 G/DL (ref 33.6–35)
MCV RBC AUTO: 92.5 FL (ref 81.4–97.8)
MONOCYTES # BLD AUTO: 0.32 K/UL (ref 0–0.85)
MONOCYTES NFR BLD AUTO: 5.6 % (ref 0–13.4)
NEUTROPHILS # BLD AUTO: 2.49 K/UL (ref 2–7.15)
NEUTROPHILS NFR BLD: 43.5 % (ref 44–72)
NRBC # BLD AUTO: 0 K/UL
NRBC BLD-RTO: 0 /100 WBC
PLATELET # BLD AUTO: 227 K/UL (ref 164–446)
PMV BLD AUTO: 11.1 FL (ref 9–12.9)
POTASSIUM SERPL-SCNC: 3.7 MMOL/L (ref 3.6–5.5)
PROT SERPL-MCNC: 7.1 G/DL (ref 6–8.2)
PROTHROMBIN TIME: 12.5 SEC (ref 12–14.6)
RBC # BLD AUTO: 4.56 M/UL (ref 4.2–5.4)
SODIUM SERPL-SCNC: 141 MMOL/L (ref 135–145)
TSH SERPL DL<=0.005 MIU/L-ACNC: 1.38 UIU/ML (ref 0.38–5.33)
WBC # BLD AUTO: 5.7 K/UL (ref 4.8–10.8)

## 2018-08-27 PROCEDURE — 80053 COMPREHEN METABOLIC PANEL: CPT

## 2018-08-27 PROCEDURE — 85610 PROTHROMBIN TIME: CPT

## 2018-08-27 PROCEDURE — 36415 COLL VENOUS BLD VENIPUNCTURE: CPT

## 2018-08-27 PROCEDURE — 85025 COMPLETE CBC W/AUTO DIFF WBC: CPT

## 2018-08-27 PROCEDURE — 84443 ASSAY THYROID STIM HORMONE: CPT

## 2018-09-06 ENCOUNTER — HOSPITAL ENCOUNTER (OUTPATIENT)
Dept: LAB | Facility: MEDICAL CENTER | Age: 53
End: 2018-09-06
Attending: INTERNAL MEDICINE
Payer: COMMERCIAL

## 2018-09-06 DIAGNOSIS — E55.9 VITAMIN D DEFICIENCY: ICD-10-CM

## 2018-09-06 DIAGNOSIS — Z13.220 LIPID SCREENING: ICD-10-CM

## 2018-09-06 DIAGNOSIS — R74.01 TRANSAMINITIS: ICD-10-CM

## 2018-09-06 LAB
25(OH)D3 SERPL-MCNC: 20 NG/ML (ref 30–100)
ALBUMIN SERPL BCP-MCNC: 4.6 G/DL (ref 3.2–4.9)
ALBUMIN/GLOB SERPL: 1.6 G/DL
ALP SERPL-CCNC: 91 U/L (ref 30–99)
ALT SERPL-CCNC: 24 U/L (ref 2–50)
ANION GAP SERPL CALC-SCNC: 5 MMOL/L (ref 0–11.9)
AST SERPL-CCNC: 26 U/L (ref 12–45)
BASOPHILS # BLD AUTO: 0.8 % (ref 0–1.8)
BASOPHILS # BLD: 0.04 K/UL (ref 0–0.12)
BILIRUB SERPL-MCNC: 1.1 MG/DL (ref 0.1–1.5)
BUN SERPL-MCNC: 19 MG/DL (ref 8–22)
CALCIUM SERPL-MCNC: 9.9 MG/DL (ref 8.5–10.5)
CHLORIDE SERPL-SCNC: 106 MMOL/L (ref 96–112)
CHOLEST SERPL-MCNC: 216 MG/DL (ref 100–199)
CO2 SERPL-SCNC: 29 MMOL/L (ref 20–33)
CREAT SERPL-MCNC: 0.95 MG/DL (ref 0.5–1.4)
EOSINOPHIL # BLD AUTO: 0.24 K/UL (ref 0–0.51)
EOSINOPHIL NFR BLD: 4.9 % (ref 0–6.9)
ERYTHROCYTE [DISTWIDTH] IN BLOOD BY AUTOMATED COUNT: 42.8 FL (ref 35.9–50)
FASTING STATUS PATIENT QL REPORTED: NORMAL
GLOBULIN SER CALC-MCNC: 2.9 G/DL (ref 1.9–3.5)
GLUCOSE SERPL-MCNC: 97 MG/DL (ref 65–99)
HCT VFR BLD AUTO: 44.2 % (ref 37–47)
HDLC SERPL-MCNC: 76 MG/DL
HGB BLD-MCNC: 15.1 G/DL (ref 12–16)
IMM GRANULOCYTES # BLD AUTO: 0.04 K/UL (ref 0–0.11)
IMM GRANULOCYTES NFR BLD AUTO: 0.8 % (ref 0–0.9)
LDLC SERPL CALC-MCNC: 125 MG/DL
LYMPHOCYTES # BLD AUTO: 2.11 K/UL (ref 1–4.8)
LYMPHOCYTES NFR BLD: 43.2 % (ref 22–41)
MCH RBC QN AUTO: 32.3 PG (ref 27–33)
MCHC RBC AUTO-ENTMCNC: 34.2 G/DL (ref 33.6–35)
MCV RBC AUTO: 94.4 FL (ref 81.4–97.8)
MONOCYTES # BLD AUTO: 0.39 K/UL (ref 0–0.85)
MONOCYTES NFR BLD AUTO: 8 % (ref 0–13.4)
NEUTROPHILS # BLD AUTO: 2.06 K/UL (ref 2–7.15)
NEUTROPHILS NFR BLD: 42.3 % (ref 44–72)
NRBC # BLD AUTO: 0 K/UL
NRBC BLD-RTO: 0 /100 WBC
PLATELET # BLD AUTO: 219 K/UL (ref 164–446)
PMV BLD AUTO: 11.5 FL (ref 9–12.9)
POTASSIUM SERPL-SCNC: 4.3 MMOL/L (ref 3.6–5.5)
PROT SERPL-MCNC: 7.5 G/DL (ref 6–8.2)
RBC # BLD AUTO: 4.68 M/UL (ref 4.2–5.4)
SODIUM SERPL-SCNC: 140 MMOL/L (ref 135–145)
TRIGL SERPL-MCNC: 74 MG/DL (ref 0–149)
WBC # BLD AUTO: 4.9 K/UL (ref 4.8–10.8)

## 2018-09-06 PROCEDURE — 80053 COMPREHEN METABOLIC PANEL: CPT

## 2018-09-06 PROCEDURE — 36415 COLL VENOUS BLD VENIPUNCTURE: CPT

## 2018-09-06 PROCEDURE — 80061 LIPID PANEL: CPT

## 2018-09-06 PROCEDURE — 82306 VITAMIN D 25 HYDROXY: CPT

## 2018-09-06 PROCEDURE — 85025 COMPLETE CBC W/AUTO DIFF WBC: CPT

## 2018-09-14 ENCOUNTER — OFFICE VISIT (OUTPATIENT)
Dept: MEDICAL GROUP | Facility: PHYSICIAN GROUP | Age: 53
End: 2018-09-14
Payer: COMMERCIAL

## 2018-09-14 VITALS
DIASTOLIC BLOOD PRESSURE: 70 MMHG | TEMPERATURE: 99.7 F | RESPIRATION RATE: 16 BRPM | HEIGHT: 58 IN | BODY MASS INDEX: 24.77 KG/M2 | SYSTOLIC BLOOD PRESSURE: 100 MMHG | HEART RATE: 66 BPM | OXYGEN SATURATION: 98 % | WEIGHT: 118 LBS

## 2018-09-14 DIAGNOSIS — B00.9 HERPES SIMPLEX: ICD-10-CM

## 2018-09-14 DIAGNOSIS — Z23 NEEDS FLU SHOT: ICD-10-CM

## 2018-09-14 DIAGNOSIS — Z23 NEED FOR VACCINATION FOR ZOSTER: ICD-10-CM

## 2018-09-14 PROCEDURE — 90471 IMMUNIZATION ADMIN: CPT | Performed by: INTERNAL MEDICINE

## 2018-09-14 PROCEDURE — 99214 OFFICE O/P EST MOD 30 MIN: CPT | Mod: 25 | Performed by: INTERNAL MEDICINE

## 2018-09-14 PROCEDURE — 90686 IIV4 VACC NO PRSV 0.5 ML IM: CPT | Performed by: INTERNAL MEDICINE

## 2018-09-14 RX ORDER — ACYCLOVIR 400 MG/1
400 TABLET ORAL 2 TIMES DAILY PRN
Qty: 20 TAB | Refills: 0 | Status: SHIPPED | OUTPATIENT
Start: 2018-09-14 | End: 2020-04-06

## 2018-09-14 ASSESSMENT — PATIENT HEALTH QUESTIONNAIRE - PHQ9: CLINICAL INTERPRETATION OF PHQ2 SCORE: 0

## 2018-09-14 NOTE — PROGRESS NOTES
Chief Complaint   Patient presents with   • Rash     rash on neck and right arm x1 month    • Results     labs        HISTORY OF PRESENT ILLNESS: Patient is a 53 y.o. female established patient who presents today to discuss the medical issues below.    Herpes simplex  Patient with lip lesion which is healing, this did scab and is improving now, did start with burning, she states she gets cold sores about 1 x a year.  Patient has another area along her neck that is darker was itching, seen in urgent care started on topical antifungal the itching has resolved but the dark patch has persisted.  She indicates the right and left neck areas.  She is in the sun quite a bit she admits to not utilizing sunscreen.  She has a birthmark along the right arm which she states has not changed.      Patient Active Problem List    Diagnosis Date Noted   • Transaminitis 06/04/2016     Priority: High   • Herpes simplex 09/14/2018   • Lichen sclerosus et atrophicus 10/11/2017   • Atrophic vaginitis 03/09/2017   • Menopause 03/09/2017   • Liver mass 06/29/2016   • Hypokalemia 06/06/2016   • History of rhabdomyolysis 06/04/2016   • Abnormal urinalysis 06/04/2016   • Oral thrush 11/03/2015   • Hemorrhoids    • Allergic rhinitis        Allergies:Patient has no known allergies.    Current Outpatient Prescriptions   Medication Sig Dispense Refill   • acyclovir (ZOVIRAX) 400 MG tablet Take 1 Tab by mouth 2 times a day as needed. 20 Tab 0   • Zoster Vac Recomb Adjuvanted (SHINGRIX) 50 MCG Recon Susp 0.5 mL by Intramuscular route Once for 1 dose. 0.5 mL 0   • triamcinolone acetonide (KENALOG) 0.1 % Cream Apply 1 Application to affected area(s) 2 times a day. 1 Tube 0   • conjugated estrogen (PREMARIN) 0.625 MG/GM Cream Apply 1 g MWF x 2 weeks then q Sunday. 1 Tube 5   • Multiple Vitamins-Calcium (ONE-A-DAY WOMENS PO) Take  by mouth.     • DiphenhydrAMINE HCl (BENADRYL ALLERGY PO) Take  by mouth.     • CALCIUM 500 500 MG TABS Take 1 Tab by  "mouth 2 times a day, with meals.     • ZYRTEC 10 MG TABS Take 10 mg by mouth every evening.     • ciprofloxacin (CIPRO) 500 MG Tab Take 1 Tab by mouth 2 times a day. 20 Tab 0     No current facility-administered medications for this visit.          Past Medical History:   Diagnosis Date   • Allergic rhinitis, cause unspecified    • Atrophic vaginitis 3/9/2017   • History of rhabdomyolysis 6/4/2016   • Irregular menses 10/8/2009   • Liver mass 6/29/2016   • Menopause 3/9/2017   • Oral thrush 11/3/2015   • Pap smear 8/18/08   • Rosacea     sees dermatologist in [place]   • Unspecified hemorrhoids without mention of complication    • Vaginitis 4/15/2015   • Varicose vein        Social History   Substance Use Topics   • Smoking status: Never Smoker   • Smokeless tobacco: Never Used   • Alcohol use No       Family Status   Relation Status   • Mo Alive        had a nervous breakdown otherwise healthy   • Fa Alive        healthy   History reviewed. No pertinent family history.    ROS:    Respiratory: Negative for cough, sputum production, shortness of breath or wheezing.    Cardiovascular: Negative for chest pain, palpitations, orthopnea, dyspnea with exertion or edema.   Gastrointestinal: Negative for GI upset, nausea, vomiting, abdominal pain, constipation or diarrhea.   Genitourinary: Negative for dysuria, urgency, hesitancy or frequency.       Exam:    Blood pressure 100/70, pulse 66, temperature 37.6 °C (99.7 °F), resp. rate 16, height 1.473 m (4' 10\"), weight 53.5 kg (118 lb), SpO2 98 %.  General:  Well nourished, well developed female in NAD.  Skin: Minimal diffuse hyperkeratoses appreciated on the face and trunk bilateral arms, at the right neck area there is a linear characteristic show hyperpigmentation no erythema warmth.  Left neck area is circular with hyperpigmentation around the edge but well demarcated.  Cold sore scab at the upper right lip  Pulmonary: Clear to ausculation and percussion.  Normal effort. No " rales, rhonchi, or wheezing.  Cardiovascular: Regular rate and rhythm without murmur.   Abdomen: Normal bowel sounds soft and nontender no palpable liver spleen bladder mass.  Extremities: No LE edema noted.  Neuro: Grossly nonfocal.  Psych: Alert and oriented to person, place, and time. Appropriate mood and conversation.    LABS: Results reviewed and discussed with the patient, questions answered.      This dictation was created using voice recognition software. I have made reasonable attempts to correct errors, however, errors of grammar and content may exist.          Assessment/Plan:    1. Herpes simplex  Discussed with the patient implications of herpes simplex, vitamin supplementation, as needed acyclovir prescription written  - INFLUENZA VACCINE QUAD INJ >3Y(PF)  - acyclovir (ZOVIRAX) 400 MG tablet; Take 1 Tab by mouth 2 times a day as needed.  Dispense: 20 Tab; Refill: 0  - Zoster Vac Recomb Adjuvanted (SHINGRIX) 50 MCG Recon Susp; 0.5 mL by Intramuscular route Once for 1 dose.  Dispense: 0.5 mL; Refill: 0    2. Needs flu shot  - INFLUENZA VACCINE QUAD INJ >3Y(PF)  - acyclovir (ZOVIRAX) 400 MG tablet; Take 1 Tab by mouth 2 times a day as needed.  Dispense: 20 Tab; Refill: 0  - Zoster Vac Recomb Adjuvanted (SHINGRIX) 50 MCG Recon Susp; 0.5 mL by Intramuscular route Once for 1 dose.  Dispense: 0.5 mL; Refill: 0    3. Need for vaccination for zoster  Reviewed risk benefits of zoster vaccination.  Patient reports she has had a shingles episode at the right lateral abdomen years ago.  Recommendation for vaccination reviewed.  She will get this done at the pharmacy.  - INFLUENZA VACCINE QUAD INJ >3Y(PF)  - acyclovir (ZOVIRAX) 400 MG tablet; Take 1 Tab by mouth 2 times a day as needed.  Dispense: 20 Tab; Refill: 0  - Zoster Vac Recomb Adjuvanted (SHINGRIX) 50 MCG Recon Susp; 0.5 mL by Intramuscular route Once for 1 dose.  Dispense: 0.5 mL; Refill: 0    Patient was seen for 25 minutes face to face of which more than  50% of the time was spent in counseling and coordination of care regarding the above problems.

## 2018-09-14 NOTE — ASSESSMENT & PLAN NOTE
Patient with lip lesion which is healing, this did scab and is improving now, did start with burning, she states she gets cold sores about 1 x a year.

## 2019-01-17 ENCOUNTER — HOSPITAL ENCOUNTER (OUTPATIENT)
Dept: PAIN MANAGEMENT | Facility: REHABILITATION | Age: 54
End: 2019-01-17
Attending: PHYSICAL MEDICINE & REHABILITATION
Payer: COMMERCIAL

## 2019-01-17 ENCOUNTER — HOSPITAL ENCOUNTER (OUTPATIENT)
Dept: RADIOLOGY | Facility: REHABILITATION | Age: 54
End: 2019-01-17
Attending: PHYSICAL MEDICINE & REHABILITATION

## 2019-01-17 VITALS
SYSTOLIC BLOOD PRESSURE: 132 MMHG | TEMPERATURE: 98.9 F | BODY MASS INDEX: 23.79 KG/M2 | RESPIRATION RATE: 16 BRPM | WEIGHT: 113.32 LBS | HEIGHT: 58 IN | OXYGEN SATURATION: 98 % | DIASTOLIC BLOOD PRESSURE: 85 MMHG | HEART RATE: 78 BPM

## 2019-01-17 PROCEDURE — 62321 NJX INTERLAMINAR CRV/THRC: CPT

## 2019-01-17 PROCEDURE — 700111 HCHG RX REV CODE 636 W/ 250 OVERRIDE (IP)

## 2019-01-17 PROCEDURE — 700117 HCHG RX CONTRAST REV CODE 255

## 2019-01-17 RX ORDER — MIDAZOLAM HYDROCHLORIDE 1 MG/ML
INJECTION INTRAMUSCULAR; INTRAVENOUS
Status: COMPLETED
Start: 2019-01-17 | End: 2019-01-17

## 2019-01-17 RX ORDER — METHYLPREDNISOLONE ACETATE 80 MG/ML
INJECTION, SUSPENSION INTRA-ARTICULAR; INTRALESIONAL; INTRAMUSCULAR; SOFT TISSUE
Status: COMPLETED
Start: 2019-01-17 | End: 2019-01-17

## 2019-01-17 RX ADMIN — METHYLPREDNISOLONE ACETATE 80 MG: 80 INJECTION, SUSPENSION INTRA-ARTICULAR; INTRALESIONAL; INTRAMUSCULAR; SOFT TISSUE at 15:21

## 2019-01-17 RX ADMIN — FENTANYL CITRATE 50 MCG: 50 INJECTION, SOLUTION INTRAMUSCULAR; INTRAVENOUS at 15:16

## 2019-01-17 RX ADMIN — IOHEXOL 1 ML: 240 INJECTION, SOLUTION INTRATHECAL; INTRAVASCULAR; INTRAVENOUS; ORAL at 15:21

## 2019-01-17 RX ADMIN — MIDAZOLAM HYDROCHLORIDE 1 MG: 1 INJECTION, SOLUTION INTRAMUSCULAR; INTRAVENOUS at 15:16

## 2019-01-17 ASSESSMENT — PAIN SCALES - GENERAL
PAINLEVEL_OUTOF10: 7
PAINLEVEL_OUTOF10: 0

## 2019-01-17 NOTE — PROGRESS NOTES
Pre-operative Diagnosis: Cervical radiculopathy right    Post-operative Diagnosis: Cervical radiculopathy right    Procedure: Cervical interlaminar epidural steroid injection right C6-C7    Description of procedure:    The risks, benefits, and alternatives of the procedure were reviewed and discussed with the patient. Written informed consent was freely obtained. A pre-procedural time-out was conducted by the physician verifying patient’s identity, procedure to be performed, procedure site and side, and allergy verification. Appropriate equipment was determined to be in place for the procedure.     An IV was placed peripherally, and the patient received a low dose of IV Versed for anxiolysis with a low dose of IV Fentanyl for pain control. The patient's vital signs were carefully monitored before, throughout, and after the procedure.     In the fluoroscopy suite the patient was placed in a prone position, a pillow placed underneath their chest. The skin was prepped and draped in the usual sterile fashion. The fluoroscope was placed over the cervical neck at the appropriate injection AP angle view, and the target for injection was marked. A 25g needle was placed into the marked site, and approx 0.5cc of 1% Lidocaine was injected subcutaneously into the epidermal and dermal layers. The needle was removed. A 20g Tuohy needle was then placed and advanced under fluoroscopic guidance to the C7 lamina and advanced to using loss-of-resistance technique into the epidural space at C6-C7. Contrast dye was then injected after negative aspiration good visualization of the epidural space was noted on fluoroscopic imaging. Following negative aspiration, approx 5cc of 0.9% NS preservative free with 80 mg of Depo-Medrol was then injected without any resistance and free flow. The patient tolerated this very well the needle was then removed and the paraspinal muscles and a half cc 1% lidocaine was injected after negative aspiration  and it was then completely withdrawn and area cleansed The patient's back was covered with a 4x4 gauze, the area was cleansed with sterile normal saline, and a dressing was applied. There were no complications noted.     The patient was then evaluated post-procedure, and was hemodynamically stable prior to leaving the post-operative care unit.     Juan Carlos Tellez MD  PM&R/Pain Mgmt

## 2019-01-17 NOTE — NON-PROVIDER
Pt position prone by RN and CST.  Arms tucked to pt's sides.  Pillow placed under feet and lower legs by CST.Time out done, included Procedure, Allergies, Stop Bang score, and pt history.

## 2019-01-17 NOTE — NON-PROVIDER
Reviewed Plan of Care with patient. Confirmed that she has stopped all blood thinning medications for last one week.Confirmed that she has a . Reviewed home care instructions with patient prior to procedure. All questions and concerns addressed.

## 2019-10-01 ENCOUNTER — OFFICE VISIT (OUTPATIENT)
Dept: MEDICAL GROUP | Facility: PHYSICIAN GROUP | Age: 54
End: 2019-10-01
Payer: COMMERCIAL

## 2019-10-01 VITALS
HEART RATE: 63 BPM | WEIGHT: 118 LBS | DIASTOLIC BLOOD PRESSURE: 66 MMHG | SYSTOLIC BLOOD PRESSURE: 122 MMHG | HEIGHT: 58 IN | RESPIRATION RATE: 12 BRPM | BODY MASS INDEX: 24.77 KG/M2 | OXYGEN SATURATION: 96 % | TEMPERATURE: 98.8 F

## 2019-10-01 DIAGNOSIS — Z12.11 SCREENING FOR COLON CANCER: ICD-10-CM

## 2019-10-01 DIAGNOSIS — E78.5 HYPERLIPIDEMIA, UNSPECIFIED HYPERLIPIDEMIA TYPE: ICD-10-CM

## 2019-10-01 DIAGNOSIS — Z23 NEEDS FLU SHOT: ICD-10-CM

## 2019-10-01 DIAGNOSIS — E55.9 VITAMIN D DEFICIENCY: ICD-10-CM

## 2019-10-01 DIAGNOSIS — Z12.39 BREAST CANCER SCREENING: ICD-10-CM

## 2019-10-01 DIAGNOSIS — L60.3 NAIL DYSTROPHY: ICD-10-CM

## 2019-10-01 DIAGNOSIS — Z13.220 LIPID SCREENING: ICD-10-CM

## 2019-10-01 PROCEDURE — 90686 IIV4 VACC NO PRSV 0.5 ML IM: CPT | Performed by: INTERNAL MEDICINE

## 2019-10-01 PROCEDURE — 90471 IMMUNIZATION ADMIN: CPT | Performed by: INTERNAL MEDICINE

## 2019-10-01 PROCEDURE — 99214 OFFICE O/P EST MOD 30 MIN: CPT | Mod: 25 | Performed by: INTERNAL MEDICINE

## 2019-10-01 ASSESSMENT — PATIENT HEALTH QUESTIONNAIRE - PHQ9: CLINICAL INTERPRETATION OF PHQ2 SCORE: 0

## 2019-10-01 NOTE — ASSESSMENT & PLAN NOTE
Patient is worried about her toenails of the great toe have turned black.  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  She additionally has some ridging and darkening of her fingernails which she wants checked.  She cannot recall any trauma.  She has no pain she has no dyspnea at rest or exertion.

## 2019-10-01 NOTE — PROGRESS NOTES
Chief Complaint   Patient presents with   • Toe Pain     bilateral great tow brusing, pain       HISTORY OF PRESENT ILLNESS: Patient is a 54 y.o. female established patient who presents today to discuss the medical issues below.    Nail dystrophy  Patient is worried about her toenails of the great toe have turned black.  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  She additionally has some ridging and darkening of her fingernails which she wants checked.  She cannot recall any trauma.  She has no pain she has no dyspnea at rest or exertion.      Patient Active Problem List    Diagnosis Date Noted   • Transaminitis 06/04/2016     Priority: High   • Nail dystrophy 10/01/2019   • Herpes simplex 09/14/2018   • Lichen sclerosus et atrophicus 10/11/2017   • Atrophic vaginitis 03/09/2017   • Menopause 03/09/2017   • Liver mass 06/29/2016   • Hypokalemia 06/06/2016   • History of rhabdomyolysis 06/04/2016   • Abnormal urinalysis 06/04/2016   • Oral thrush 11/03/2015   • Hemorrhoids    • Allergic rhinitis        Allergies:Patient has no known allergies.    Current Outpatient Medications   Medication Sig Dispense Refill   • vitamin D (CHOLECALCIFEROL) 1000 UNIT Tab Take 1,000 Units by mouth every day.     • Multiple Vitamins-Calcium (ONE-A-DAY WOMENS PO) Take  by mouth.     • DiphenhydrAMINE HCl (BENADRYL ALLERGY PO) Take  by mouth.     • CALCIUM 500 500 MG TABS Take 1 Tab by mouth 2 times a day, with meals.     • ZYRTEC 10 MG TABS Take 10 mg by mouth every evening.     • acyclovir (ZOVIRAX) 400 MG tablet Take 1 Tab by mouth 2 times a day as needed. 20 Tab 0   • triamcinolone acetonide (KENALOG) 0.1 % Cream Apply 1 Application to affected area(s) 2 times a day. 1 Tube 0   • ciprofloxacin (CIPRO) 500 MG Tab Take 1 Tab by mouth 2 times a day. 20 Tab 0   • conjugated estrogen (PREMARIN) 0.625 MG/GM Cream Apply 1 g MWF x 2 weeks then q Sunday. 1 Tube 5  "    No current facility-administered medications for this visit.          Past Medical History:   Diagnosis Date   • Allergic rhinitis, cause unspecified    • Atrophic vaginitis 3/9/2017   • History of rhabdomyolysis 6/4/2016   • Irregular menses 10/8/2009   • Liver mass 6/29/2016   • Menopause 3/9/2017   • Oral thrush 11/3/2015   • Pap smear 8/18/08   • Rosacea     sees dermatologist in [place]   • Unspecified hemorrhoids without mention of complication    • Vaginitis 4/15/2015   • Varicose vein        Social History     Tobacco Use   • Smoking status: Never Smoker   • Smokeless tobacco: Never Used   Substance Use Topics   • Alcohol use: No     Alcohol/week: 0.0 oz   • Drug use: No       Family Status   Relation Name Status   • Mo  Alive        had a nervous breakdown otherwise healthy   • Fa  Alive        healthy   No family history on file.    ROS:    Respiratory: Negative for cough, sputum production, shortness of breath or wheezing.    Cardiovascular: Negative for chest pain, palpitations, orthopnea, dyspnea with exertion or edema.   Gastrointestinal: Negative for GI upset, nausea, vomiting, abdominal pain, constipation or diarrhea.   Genitourinary: Negative for dysuria, urgency, hesitancy or frequency.       Exam:    /66 (BP Location: Right arm, Patient Position: Sitting, BP Cuff Size: Adult)   Pulse 63   Temp 37.1 °C (98.8 °F)   Resp 12   Ht 1.473 m (4' 10\")   Wt 53.5 kg (118 lb)   SpO2 96%   General:  Well nourished, well developed female in NAD.  HENT: Normocephalic, bilateral TMs are intact, nasal and oral mucosa with no lesions,   Neck: Supple without bruit. Thyroid is not enlarged.  Pulmonary: Clear to ausculation and percussion.  Normal effort. No rales, rhonchi, or wheezing.  Cardiovascular: Regular rate and rhythm without murmur.   Abdomen: Normal bowel sounds soft and nontender no palpable liver spleen bladder mass.  Extremities: No LE edema noted.  Both large toenails with bruising no overt " nail changes.  The fingertips show only a slight purplish hue to the tissue underneath the nail the nail itself appears intact although there are some horizontal ridges.  Neuro: Grossly nonfocal.  Psych: Alert and oriented to person, place, and time. Appropriate mood and conversation.        This dictation was created using voice recognition software. I have made reasonable attempts to correct errors, however, errors of grammar and content may exist.          Assessment/Plan:    1. Nail dystrophy  Etiology of the nail discussed differential.  I do believe that the toenail is from bruising of a short shoe offered referral to podiatry consideration for changing shoe size increasing length.  Monitoring for now.  Considering the fingernails she is clinically stable and this is a nonspecific finding to my exam.  Consideration for thyroid abnormalities will add lab to upcoming blood draw.  Offered referral to dermatology PRN any significant changes.  - TSH WITH REFLEX TO FT4; Future    2. Needs flu shot    - Influenza Vaccine Quad Injection (PF)    3. Hyperlipidemia, unspecified hyperlipidemia type  Will be due for laboratory annual exams- Comp Metabolic Panel; Future  - CBC WITH DIFFERENTIAL; Future    4. Vitamin D deficiency  Ongoing supplementation and monitoring recommended  - VITAMIN D,25 HYDROXY; Future    5. Lipid screening  Labs as above  - Lipid Profile; Future    6. Screening for colon cancer  Patient reports she had her first colonoscopy done at age 45 at the time of her rhabdomyolysis.  She was told a 10-year follow-up.  Referral back to Dr. Guzman for colon cancer screening.  - REFERRAL TO GI FOR COLONOSCOPY    7. Breast cancer screening  Overdue for mammogram testing order written  - MA-SCREEN MAMMO W/CAD-BILAT; Future    Patient was seen for 25 minutes face to face of which more than 50% of the time was spent in counseling and coordination of care regarding the above problems.

## 2020-01-09 PROBLEM — Z86.010 PERSONAL HISTORY OF COLONIC POLYPS: Status: ACTIVE | Noted: 2020-01-09

## 2020-01-09 PROBLEM — Z86.0100 PERSONAL HISTORY OF COLONIC POLYPS: Status: ACTIVE | Noted: 2020-01-09

## 2020-02-03 ENCOUNTER — OFFICE VISIT (OUTPATIENT)
Dept: MEDICAL GROUP | Facility: PHYSICIAN GROUP | Age: 55
End: 2020-02-03
Payer: COMMERCIAL

## 2020-02-03 VITALS
RESPIRATION RATE: 14 BRPM | DIASTOLIC BLOOD PRESSURE: 64 MMHG | HEIGHT: 58 IN | OXYGEN SATURATION: 97 % | HEART RATE: 65 BPM | SYSTOLIC BLOOD PRESSURE: 122 MMHG | TEMPERATURE: 97.6 F | BODY MASS INDEX: 24.56 KG/M2 | WEIGHT: 117 LBS

## 2020-02-03 DIAGNOSIS — B35.4 TINEA CORPORIS: ICD-10-CM

## 2020-02-03 PROCEDURE — 99212 OFFICE O/P EST SF 10 MIN: CPT | Performed by: INTERNAL MEDICINE

## 2020-02-03 ASSESSMENT — PATIENT HEALTH QUESTIONNAIRE - PHQ9: CLINICAL INTERPRETATION OF PHQ2 SCORE: 0

## 2020-02-03 NOTE — ASSESSMENT & PLAN NOTE
Patient with a patch of skin at her right anterior chest which has been bothering her for about 2 months.  Initially small itchy patch, increasing itchiness seems to be increasing in size she scratches at it at night.  No tenderness.

## 2020-02-03 NOTE — PROGRESS NOTES
Chief Complaint   Patient presents with   • Rash     x2mon R upper chest itching, redness       HISTORY OF PRESENT ILLNESS: Patient is a 54 y.o. female established patient who presents today to discuss the medical issues below.    Tinea corporis  Patient with a patch of skin at her right anterior chest which has been bothering her for about 2 months.  Initially small itchy patch, increasing itchiness seems to be increasing in size she scratches at it at night.  No tenderness.      Patient Active Problem List    Diagnosis Date Noted   • Transaminitis 06/04/2016     Priority: High   • Hepatic hemangioma 06/29/2016     Priority: Low   • Tinea corporis 02/03/2020   • Personal history of colonic polyps 01/09/2020   • Nail dystrophy 10/01/2019   • Herpes simplex 09/14/2018   • Lichen sclerosus et atrophicus 10/11/2017   • Atrophic vaginitis 03/09/2017   • Menopause 03/09/2017   • Hypokalemia 06/06/2016   • History of rhabdomyolysis 06/04/2016   • Abnormal urinalysis 06/04/2016   • Oral thrush 11/03/2015   • Hemorrhoids    • Allergic rhinitis        Allergies:Patient has no known allergies.    Current Outpatient Medications   Medication Sig Dispense Refill   • ciclopirox (LOPROX) 0.77 % cream Apply to affected area bid x 4 weeks. 1 Tube 0   • vitamin D (CHOLECALCIFEROL) 1000 UNIT Tab Take 1,000 Units by mouth every day.     • DiphenhydrAMINE HCl (BENADRYL ALLERGY PO) Take  by mouth.     • CALCIUM 500 500 MG TABS Take 1 Tab by mouth 2 times a day, with meals.     • ZYRTEC 10 MG TABS Take 10 mg by mouth every evening.     • acyclovir (ZOVIRAX) 400 MG tablet Take 1 Tab by mouth 2 times a day as needed. 20 Tab 0   • triamcinolone acetonide (KENALOG) 0.1 % Cream Apply 1 Application to affected area(s) 2 times a day. 1 Tube 0   • ciprofloxacin (CIPRO) 500 MG Tab Take 1 Tab by mouth 2 times a day. 20 Tab 0   • conjugated estrogen (PREMARIN) 0.625 MG/GM Cream Apply 1 g MWF x 2 weeks then q Sunday. 1 Tube 5   • Multiple  "Vitamins-Calcium (ONE-A-DAY WOMENS PO) Take  by mouth.       No current facility-administered medications for this visit.          Past Medical History:   Diagnosis Date   • Allergic rhinitis, cause unspecified    • Atrophic vaginitis 3/9/2017   • History of rhabdomyolysis 6/4/2016   • Irregular menses 10/8/2009   • Liver mass 6/29/2016   • Menopause 3/9/2017   • Oral thrush 11/3/2015   • Pap smear 8/18/08   • Rosacea     sees dermatologist in [place]   • Unspecified hemorrhoids without mention of complication    • Vaginitis 4/15/2015   • Varicose vein        Social History     Tobacco Use   • Smoking status: Never Smoker   • Smokeless tobacco: Never Used   Substance Use Topics   • Alcohol use: No     Alcohol/week: 0.0 oz   • Drug use: No       Family Status   Relation Name Status   • Mo  Alive        had a nervous breakdown otherwise healthy   • Fa  Alive        healthy   History reviewed. No pertinent family history.    ROS:    Respiratory: Negative for cough, sputum production, shortness of breath or wheezing.    Cardiovascular: Negative for chest pain, palpitations, orthopnea, dyspnea with exertion or edema.   Gastrointestinal: Negative for GI upset, nausea, vomiting, abdominal pain, constipation or diarrhea.   Genitourinary: Negative for dysuria, urgency, hesitancy or frequency.       Exam:    /64 (BP Location: Right arm, Patient Position: Sitting, BP Cuff Size: Adult)   Pulse 65   Temp 36.4 °C (97.6 °F)   Resp 14   Ht 1.473 m (4' 10\")   Wt 53.1 kg (117 lb)   SpO2 97%  Body mass index is 24.45 kg/m².  General:  Well nourished, well developed female in NAD.  Skin: Diffuse xerosis however at the anterior right superior chest approximately 3 cm diameter particularly dry patch with flaking minimal hyperpigmentation.  No edema warmth erythema  Pulmonary: Clear to ausculation and percussion.  Normal effort. No rales, rhonchi, or wheezing.  Cardiovascular: Regular rate and rhythm without murmur.     This " dictation was created using voice recognition software. I have made reasonable attempts to correct errors, however, errors of grammar and content may exist.          Assessment/Plan:    1. Tinea corporis  Monitor with topical course of sickle Appearex discussed with patient and .  If persisting in 4 weeks may need additional intervention.    Patient was seen for 10 minutes face to face of which more than 50% of the time was spent in counseling and coordination of care regarding the above problems.

## 2020-03-23 ENCOUNTER — TELEPHONE (OUTPATIENT)
Dept: ONCOLOGY | Facility: MEDICAL CENTER | Age: 55
End: 2020-03-23

## 2020-03-23 NOTE — TELEPHONE ENCOUNTER
1. Caller Name:  Jenna Real                      Call Back Number: [phone] (home)   [facility] PCP or Specialty Provider: Yes         2.  Does patient have any active symptoms of respiratory illness (fever OR cough OR shortness of breath OR sore throat)? Yes, the patient reports the following respiratory symptoms: cough.    Pt called in to triage nurse with c/o nonproductive cough that started Saturday.  Denies any other s/s no fever, nasal congestion, sore throat.   has the same s/s.  No one has travel recently and the only ones in house hold is patient and .      3.  Does patient have any comoribidities? None     4.  Has the patient traveled in the last 14 days OR had any known contact with someone who is suspected or confirmed to have COVID-19?  No.    5. Disposition: Advised to perform self care, monitor for worsening symptoms and to call back in 3 days if no improvement    Note routed to [facility] Provider: [facility] only.

## 2020-04-06 ENCOUNTER — HOSPITAL ENCOUNTER (OUTPATIENT)
Facility: MEDICAL CENTER | Age: 55
End: 2020-04-06
Attending: INTERNAL MEDICINE
Payer: COMMERCIAL

## 2020-04-06 ENCOUNTER — OFFICE VISIT (OUTPATIENT)
Dept: MEDICAL GROUP | Facility: PHYSICIAN GROUP | Age: 55
End: 2020-04-06
Payer: COMMERCIAL

## 2020-04-06 VITALS
SYSTOLIC BLOOD PRESSURE: 128 MMHG | RESPIRATION RATE: 14 BRPM | HEIGHT: 58 IN | TEMPERATURE: 98.2 F | WEIGHT: 118 LBS | BODY MASS INDEX: 24.77 KG/M2 | HEART RATE: 90 BPM | DIASTOLIC BLOOD PRESSURE: 78 MMHG | OXYGEN SATURATION: 95 %

## 2020-04-06 DIAGNOSIS — R74.01 TRANSAMINITIS: ICD-10-CM

## 2020-04-06 DIAGNOSIS — N95.2 ATROPHIC VAGINITIS: ICD-10-CM

## 2020-04-06 DIAGNOSIS — B35.4 TINEA CORPORIS: ICD-10-CM

## 2020-04-06 PROCEDURE — 88175 CYTOPATH C/V AUTO FLUID REDO: CPT

## 2020-04-06 PROCEDURE — 87624 HPV HI-RISK TYP POOLED RSLT: CPT

## 2020-04-06 PROCEDURE — 99214 OFFICE O/P EST MOD 30 MIN: CPT | Performed by: INTERNAL MEDICINE

## 2020-04-06 ASSESSMENT — FIBROSIS 4 INDEX: FIB4 SCORE: 1.31

## 2020-04-06 NOTE — PROGRESS NOTES
Chief Complaint   Patient presents with   • Gynecologic Exam       HISTORY OF PRESENT ILLNESS: Patient is a 54 y.o. female established patient who presents today to discuss the medical issues below.    Atrophic vaginitis  Patient reports ongoing vaginal dryness, sometimes vaginal tear with sex.  She has not been utilizing her estrogen cream.  Denies discharge.    Tinea corporis  Patient reports PRN ciclopirox cream has been very helpful she still has various patches which come and go which are slightly red and itchy    Transaminitis  Patient has not had her annual lab work done yet.      Patient Active Problem List    Diagnosis Date Noted   • Transaminitis 06/04/2016     Priority: High   • Hepatic hemangioma 06/29/2016     Priority: Low   • Tinea corporis 02/03/2020   • Personal history of colonic polyps 01/09/2020   • Nail dystrophy 10/01/2019   • Herpes simplex 09/14/2018   • Lichen sclerosus et atrophicus 10/11/2017   • Atrophic vaginitis 03/09/2017   • Menopause 03/09/2017   • Hypokalemia 06/06/2016   • History of rhabdomyolysis 06/04/2016   • Abnormal urinalysis 06/04/2016   • Oral thrush 11/03/2015   • Hemorrhoids    • Allergic rhinitis        Allergies:Patient has no known allergies.    Current Outpatient Medications   Medication Sig Dispense Refill   • Loratadine (CLARITIN PO) Take  by mouth.     • estrogens, conjugated (PREMARIN) 0.625 MG/GM Cream Apply 1 g MWF x 2 weeks then q Sunday. 1 Tube 5   • ciclopirox (LOPROX) 0.77 % cream Apply to affected area bid x 4 weeks. 1 Tube 0   • vitamin D (CHOLECALCIFEROL) 1000 UNIT Tab Take 1,000 Units by mouth every day.     • triamcinolone acetonide (KENALOG) 0.1 % Cream Apply 1 Application to affected area(s) 2 times a day. 1 Tube 0   • Multiple Vitamins-Calcium (ONE-A-DAY WOMENS PO) Take  by mouth.     • DiphenhydrAMINE HCl (BENADRYL ALLERGY PO) Take  by mouth.     • CALCIUM 500 500 MG TABS Take 1 Tab by mouth 2 times a day, with meals.       No current  "facility-administered medications for this visit.          Past Medical History:   Diagnosis Date   • Allergic rhinitis, cause unspecified    • Atrophic vaginitis 3/9/2017   • History of rhabdomyolysis 6/4/2016   • Irregular menses 10/8/2009   • Liver mass 6/29/2016   • Menopause 3/9/2017   • Oral thrush 11/3/2015   • Pap smear 8/18/08   • Rosacea     sees dermatologist in [place]   • Unspecified hemorrhoids without mention of complication    • Vaginitis 4/15/2015   • Varicose vein        Social History     Tobacco Use   • Smoking status: Never Smoker   • Smokeless tobacco: Never Used   Substance Use Topics   • Alcohol use: No     Alcohol/week: 0.0 oz   • Drug use: No       Family Status   Relation Name Status   • Mo  Alive        had a nervous breakdown otherwise healthy   • Fa  Alive        healthy   History reviewed. No pertinent family history.    ROS:    Respiratory: Negative for cough, sputum production, shortness of breath or wheezing.    Cardiovascular: Negative for chest pain, palpitations, orthopnea, dyspnea with exertion or edema.   Gastrointestinal: Negative for GI upset, nausea, vomiting, abdominal pain, constipation or diarrhea.   Genitourinary: Negative for dysuria, urgency, hesitancy or frequency.       Exam:    /78 (BP Location: Right arm, Patient Position: Sitting, BP Cuff Size: Adult)   Pulse 90   Temp 36.8 °C (98.2 °F)   Resp 14   Ht 1.473 m (4' 10\")   Wt 53.5 kg (118 lb)   SpO2 95%  Body mass index is 24.66 kg/m².  General:  Well nourished, well developed female in NAD.  Neck: Supple without bruit. Thyroid is not enlarged.  Pulmonary: Clear to ausculation and percussion.  Normal effort. No rales, rhonchi, or wheezing.  Cardiovascular: Regular rate and rhythm without murmur.   Abdomen: Normal bowel sounds soft and nontender no palpable liver spleen bladder mass.  : normal external female genitalia, entroitus sans lesions, cervix visualized with no abnormal findings, pap smear is taken, " on bimanual exam utereus of normal size, no masses or tenderness.    Extremities: No LE edema noted.  Neuro: Grossly nonfocal.  Psych: Alert and oriented to person, place, and time. Appropriate mood and conversation.        This dictation was created using voice recognition software. I have made reasonable attempts to correct errors, however, errors of grammar and content may exist.          Assessment/Plan:    1. Atrophic vaginitis  Monitor with restart minimal topical steroids   - estrogens, conjugated (PREMARIN) 0.625 MG/GM Cream; Apply 1 g MWF x 2 weeks then q Sunday.  Dispense: 1 Tube; Refill: 5  - THINPREP PAP WITH HPV; Future    2. Tinea corporis  No clear rash today, prn ciclopirox and moisturizing measures discussed    3. Transaminitis  Patient will let me know when she has labs drawn.    Patient was seen for 25 minutes face to face of which more than 50% of the time was spent in counseling and coordination of care regarding the above problems.

## 2020-04-06 NOTE — ASSESSMENT & PLAN NOTE
Patient reports ongoing vaginal dryness, sometimes vaginal tear with sex.  She has not been utilizing her estrogen cream.  Denies discharge.

## 2020-04-06 NOTE — ASSESSMENT & PLAN NOTE
Patient reports PRN ciclopirox cream has been very helpful she still has various patches which come and go which are slightly red and itchy

## 2020-04-21 ENCOUNTER — HOSPITAL ENCOUNTER (OUTPATIENT)
Dept: LAB | Facility: MEDICAL CENTER | Age: 55
End: 2020-04-21
Attending: INTERNAL MEDICINE
Payer: COMMERCIAL

## 2020-04-21 DIAGNOSIS — E55.9 VITAMIN D DEFICIENCY: ICD-10-CM

## 2020-04-21 DIAGNOSIS — L60.3 NAIL DYSTROPHY: ICD-10-CM

## 2020-04-21 DIAGNOSIS — E78.5 HYPERLIPIDEMIA, UNSPECIFIED HYPERLIPIDEMIA TYPE: ICD-10-CM

## 2020-04-21 DIAGNOSIS — Z13.220 LIPID SCREENING: ICD-10-CM

## 2020-04-21 LAB
25(OH)D3 SERPL-MCNC: 33 NG/ML (ref 30–100)
ALBUMIN SERPL BCP-MCNC: 4.7 G/DL (ref 3.2–4.9)
ALBUMIN/GLOB SERPL: 1.6 G/DL
ALP SERPL-CCNC: 104 U/L (ref 30–99)
ALT SERPL-CCNC: 22 U/L (ref 2–50)
ANION GAP SERPL CALC-SCNC: 12 MMOL/L (ref 7–16)
AST SERPL-CCNC: 28 U/L (ref 12–45)
BASOPHILS # BLD AUTO: 0.9 % (ref 0–1.8)
BASOPHILS # BLD: 0.04 K/UL (ref 0–0.12)
BILIRUB SERPL-MCNC: 0.9 MG/DL (ref 0.1–1.5)
BUN SERPL-MCNC: 18 MG/DL (ref 8–22)
CALCIUM SERPL-MCNC: 9.6 MG/DL (ref 8.5–10.5)
CHLORIDE SERPL-SCNC: 104 MMOL/L (ref 96–112)
CHOLEST SERPL-MCNC: 233 MG/DL (ref 100–199)
CO2 SERPL-SCNC: 26 MMOL/L (ref 20–33)
CREAT SERPL-MCNC: 0.86 MG/DL (ref 0.5–1.4)
EOSINOPHIL # BLD AUTO: 0.09 K/UL (ref 0–0.51)
EOSINOPHIL NFR BLD: 2 % (ref 0–6.9)
ERYTHROCYTE [DISTWIDTH] IN BLOOD BY AUTOMATED COUNT: 42 FL (ref 35.9–50)
FASTING STATUS PATIENT QL REPORTED: NORMAL
GLOBULIN SER CALC-MCNC: 2.9 G/DL (ref 1.9–3.5)
GLUCOSE SERPL-MCNC: 97 MG/DL (ref 65–99)
HCT VFR BLD AUTO: 43.7 % (ref 37–47)
HDLC SERPL-MCNC: 82 MG/DL
HGB BLD-MCNC: 15 G/DL (ref 12–16)
IMM GRANULOCYTES # BLD AUTO: 0 K/UL (ref 0–0.11)
IMM GRANULOCYTES NFR BLD AUTO: 0 % (ref 0–0.9)
LDLC SERPL CALC-MCNC: 136 MG/DL
LYMPHOCYTES # BLD AUTO: 2.31 K/UL (ref 1–4.8)
LYMPHOCYTES NFR BLD: 51 % (ref 22–41)
MCH RBC QN AUTO: 32.5 PG (ref 27–33)
MCHC RBC AUTO-ENTMCNC: 34.3 G/DL (ref 33.6–35)
MCV RBC AUTO: 94.6 FL (ref 81.4–97.8)
MONOCYTES # BLD AUTO: 0.37 K/UL (ref 0–0.85)
MONOCYTES NFR BLD AUTO: 8.2 % (ref 0–13.4)
NEUTROPHILS # BLD AUTO: 1.72 K/UL (ref 2–7.15)
NEUTROPHILS NFR BLD: 37.9 % (ref 44–72)
NRBC # BLD AUTO: 0 K/UL
NRBC BLD-RTO: 0 /100 WBC
PLATELET # BLD AUTO: 230 K/UL (ref 164–446)
PMV BLD AUTO: 10.8 FL (ref 9–12.9)
POTASSIUM SERPL-SCNC: 4.2 MMOL/L (ref 3.6–5.5)
PROT SERPL-MCNC: 7.6 G/DL (ref 6–8.2)
RBC # BLD AUTO: 4.62 M/UL (ref 4.2–5.4)
SODIUM SERPL-SCNC: 142 MMOL/L (ref 135–145)
TRIGL SERPL-MCNC: 76 MG/DL (ref 0–149)
TSH SERPL DL<=0.005 MIU/L-ACNC: 2.17 UIU/ML (ref 0.38–5.33)
WBC # BLD AUTO: 4.5 K/UL (ref 4.8–10.8)

## 2020-04-21 PROCEDURE — 80061 LIPID PANEL: CPT

## 2020-04-21 PROCEDURE — 80053 COMPREHEN METABOLIC PANEL: CPT

## 2020-04-21 PROCEDURE — 36415 COLL VENOUS BLD VENIPUNCTURE: CPT

## 2020-04-21 PROCEDURE — 82306 VITAMIN D 25 HYDROXY: CPT

## 2020-04-21 PROCEDURE — 84443 ASSAY THYROID STIM HORMONE: CPT

## 2020-04-21 PROCEDURE — 85025 COMPLETE CBC W/AUTO DIFF WBC: CPT

## 2020-08-03 ENCOUNTER — OFFICE VISIT (OUTPATIENT)
Dept: MEDICAL GROUP | Facility: PHYSICIAN GROUP | Age: 55
End: 2020-08-03
Payer: COMMERCIAL

## 2020-08-03 VITALS
WEIGHT: 119.5 LBS | DIASTOLIC BLOOD PRESSURE: 82 MMHG | OXYGEN SATURATION: 99 % | TEMPERATURE: 98 F | RESPIRATION RATE: 16 BRPM | BODY MASS INDEX: 24.98 KG/M2 | HEART RATE: 103 BPM | SYSTOLIC BLOOD PRESSURE: 142 MMHG

## 2020-08-03 DIAGNOSIS — R74.01 TRANSAMINITIS: ICD-10-CM

## 2020-08-03 DIAGNOSIS — Z86.010 PERSONAL HISTORY OF COLONIC POLYPS: ICD-10-CM

## 2020-08-03 DIAGNOSIS — M25.522 PAIN OF BOTH ELBOWS: ICD-10-CM

## 2020-08-03 DIAGNOSIS — M25.521 PAIN OF BOTH ELBOWS: ICD-10-CM

## 2020-08-03 DIAGNOSIS — J30.9 ALLERGIC RHINITIS, UNSPECIFIED SEASONALITY, UNSPECIFIED TRIGGER: ICD-10-CM

## 2020-08-03 DIAGNOSIS — D18.03 HEPATIC HEMANGIOMA: ICD-10-CM

## 2020-08-03 PROCEDURE — 99214 OFFICE O/P EST MOD 30 MIN: CPT | Performed by: INTERNAL MEDICINE

## 2020-08-03 ASSESSMENT — FIBROSIS 4 INDEX: FIB4 SCORE: 1.4

## 2020-08-03 NOTE — ASSESSMENT & PLAN NOTE
Patient complaining of 1 mo of left elbow pain, she indicates worse with lifting objects, last week started with right arm.  She indicates only the lateral area of bilateral elbows. Some aleve helps. She uses the aleve 2-3 tablets a day.  She feels worse with activity.  No weakness.

## 2020-08-03 NOTE — ASSESSMENT & PLAN NOTE
Patient has had some problems with swelling around her eyes and itchy ears bilaterally at the outside of her ears x 1 week.  She is using benadryl.  She is not taking the loratadine or zyrtec.  She isn't clear why she tried some benadryl.   The benadryl also makes her sleepy

## 2020-08-03 NOTE — PROGRESS NOTES
Chief Complaint   Patient presents with   • Arm Pain     both arms L over a month    • Eye Problem     eye gets itchy and swollen        HISTORY OF PRESENT ILLNESS: Patient is a 54 y.o. female established patient who presents today to discuss the medical issues below.    Personal history of colonic polyps  Single polyp 12/19, recommended 10 year follow up.     Pain of both elbows  Patient complaining of 1 mo of left elbow pain, she indicates worse with lifting objects, last week started with right arm.  She indicates only the lateral area of bilateral elbows. Some aleve helps. She uses the aleve 2-3 tablets a day.  She feels worse with activity.  No weakness.      Allergic rhinitis  Patient has had some problems with swelling around her eyes and itchy ears bilaterally at the outside of her ears x 1 week.  She is using benadryl.  She is not taking the loratadine or zyrtec.  She isn't clear why she tried some benadryl.   The benadryl also makes her sleepy      Patient Active Problem List    Diagnosis Date Noted   • Transaminitis 06/04/2016     Priority: High   • Personal history of colonic polyps 01/09/2020     Priority: Medium   • Lichen sclerosus et atrophicus 10/11/2017     Priority: Medium   • Atrophic vaginitis 03/09/2017     Priority: Medium   • Menopause 03/09/2017     Priority: Medium   • History of rhabdomyolysis 06/04/2016     Priority: Medium   • Allergic rhinitis      Priority: Medium   • Hepatic hemangioma 06/29/2016     Priority: Low   • Pain of both elbows 08/03/2020   • Tinea corporis 02/03/2020   • Nail dystrophy 10/01/2019   • Herpes simplex 09/14/2018   • Abnormal urinalysis 06/04/2016   • Oral thrush 11/03/2015   • Hemorrhoids        Allergies:Patient has no known allergies.    Current Outpatient Medications   Medication Sig Dispense Refill   • estrogens, conjugated (PREMARIN) 0.625 MG/GM Cream Apply 1 g MWF x 2 weeks then q Sunday. 1 Tube 5   • ciclopirox (LOPROX) 0.77 % cream Apply to affected  area bid x 4 weeks. 1 Tube 0   • vitamin D (CHOLECALCIFEROL) 1000 UNIT Tab Take 1,000 Units by mouth every day.     • Multiple Vitamins-Calcium (ONE-A-DAY WOMENS PO) Take  by mouth.     • DiphenhydrAMINE HCl (BENADRYL ALLERGY PO) Take  by mouth.     • CALCIUM 500 500 MG TABS Take 1 Tab by mouth 2 times a day, with meals.     • Loratadine (CLARITIN PO) Take  by mouth.     • triamcinolone acetonide (KENALOG) 0.1 % Cream Apply 1 Application to affected area(s) 2 times a day. (Patient not taking: Reported on 8/3/2020) 1 Tube 0     No current facility-administered medications for this visit.          Past Medical History:   Diagnosis Date   • Allergic rhinitis, cause unspecified    • Atrophic vaginitis 3/9/2017   • History of rhabdomyolysis 6/4/2016   • Irregular menses 10/8/2009   • Liver mass 6/29/2016   • Menopause 3/9/2017   • Oral thrush 11/3/2015   • Pap smear 8/18/08   • Rosacea     sees dermatologist in [place]   • Unspecified hemorrhoids without mention of complication    • Vaginitis 4/15/2015   • Varicose vein        Social History     Tobacco Use   • Smoking status: Never Smoker   • Smokeless tobacco: Never Used   Substance Use Topics   • Alcohol use: No     Alcohol/week: 0.0 oz   • Drug use: No       Family Status   Relation Name Status   • Mo  Alive        had a nervous breakdown otherwise healthy   • Fa  Alive        healthy   History reviewed. No pertinent family history.    ROS:    Respiratory: Negative for cough, sputum production, shortness of breath or wheezing.    Cardiovascular: Negative for chest pain, palpitations, orthopnea, dyspnea with exertion or edema.   Gastrointestinal: Negative for GI upset, nausea, vomiting, abdominal pain, constipation or diarrhea.   Genitourinary: Negative for dysuria, urgency, hesitancy or frequency.       Exam:   /82 (BP Location: Left arm, Patient Position: Sitting, BP Cuff Size: Adult)   Pulse (!) 103   Temp 36.7 °C (98 °F) (Temporal)   Resp 16   Wt 54.2 kg  (119 lb 8 oz)   SpO2 99%  Body mass index is 24.98 kg/m².  General:  Well nourished, well developed female in NAD.  Skin: No rash or lesions appreciated up on the skin of her neck or arms  HENT: Normocephalic, bilateral TMs are intact,   Neck: Supple without bruit. Thyroid is not enlarged.  Pulmonary: Clear to ausculation and percussion.  Normal effort. No rales, rhonchi, or wheezing.  Cardiovascular: Regular rate and rhythm without murmur.   Abdomen: Normal bowel sounds soft and nontender no palpable liver spleen bladder mass.  Extremities: No LE edema noted.  No point tenderness at either elbow full range of motion.  No exacerbation of tenderness with pronation or supination.  Joint shows no signs of acute edema  Neuro: Grossly nonfocal.  Psych: Alert and oriented to person, place, and time. Appropriate mood and conversation.    LABS: Results reviewed and discussed with the patient, questions answered.      This dictation was created using voice recognition software. I have made reasonable attempts to correct errors, however, errors of grammar and content may exist.          Assessment/Plan:    1. Hepatic hemangioma  Felt to be incidentaloma by GI, no clinical evidence of exacerbation.    2. Personal history of colonic polyps  Current with screening due again and 2029    3. Pain of both elbows  Most consistent with osteoarthritis, minimal localization to tendons.  Monitor with adequate anti-inflammatories discussed with patient.    4. Allergic rhinitis, unspecified seasonality, unspecified trigger  Sub-clinically managed by her medication regime.  This is discussed at length.  Options between Benadryl or the long-acting nonsedating antihistamines discussed written instructions given.    5. Transaminitis  Last labs with elevated alkaline phosphatase only.  On review of records I do not see that the AMA has ever been done.  We will go ahead and continue lab monitoring with additional AMA and GGT assessment.  - Comp  Metabolic Panel; Future  - CBC WITH DIFFERENTIAL; Future  - GAMMA GT (GGT); Future  - MITOCHONDRIAL (M2) AB; Future       Patient was seen for 25 minutes face to face of which more than 50% of the time was spent in counseling and coordination of care regarding the above problems.

## 2020-08-03 NOTE — PATIENT INSTRUCTIONS
Allergy pills: itchy ears, swelling eyes or runny or congested nose    Benadryl, (most likely to make you sleepy and generally lasts about 12 hours.    Zyrtec (cetirazine) should last 24 hours and be less sedating  Loratadine (Claritin) also should last 24 hours and be less sedating.     Aleve 1 tab 2 x a day with food until no discomfort x 2-3 days.  If upset stomach discontinue and call   Heat will help.     Labs prior to OV. I am checking one more lab test because of the periodic elevated liver functions, not a serious problem.

## 2020-11-05 ENCOUNTER — HOSPITAL ENCOUNTER (OUTPATIENT)
Dept: LAB | Facility: MEDICAL CENTER | Age: 55
End: 2020-11-05
Attending: INTERNAL MEDICINE
Payer: COMMERCIAL

## 2020-11-05 DIAGNOSIS — R74.01 TRANSAMINITIS: ICD-10-CM

## 2020-11-05 LAB
ALBUMIN SERPL BCP-MCNC: 4.6 G/DL (ref 3.2–4.9)
ALBUMIN/GLOB SERPL: 1.7 G/DL
ALP SERPL-CCNC: 112 U/L (ref 30–99)
ALT SERPL-CCNC: 22 U/L (ref 2–50)
ANION GAP SERPL CALC-SCNC: 12 MMOL/L (ref 7–16)
AST SERPL-CCNC: 26 U/L (ref 12–45)
BASOPHILS # BLD AUTO: 1.2 % (ref 0–1.8)
BASOPHILS # BLD: 0.06 K/UL (ref 0–0.12)
BILIRUB SERPL-MCNC: 0.9 MG/DL (ref 0.1–1.5)
BUN SERPL-MCNC: 14 MG/DL (ref 8–22)
CALCIUM SERPL-MCNC: 9.7 MG/DL (ref 8.5–10.5)
CHLORIDE SERPL-SCNC: 103 MMOL/L (ref 96–112)
CO2 SERPL-SCNC: 27 MMOL/L (ref 20–33)
CREAT SERPL-MCNC: 0.89 MG/DL (ref 0.5–1.4)
EOSINOPHIL # BLD AUTO: 0.16 K/UL (ref 0–0.51)
EOSINOPHIL NFR BLD: 3.2 % (ref 0–6.9)
ERYTHROCYTE [DISTWIDTH] IN BLOOD BY AUTOMATED COUNT: 42.8 FL (ref 35.9–50)
FASTING STATUS PATIENT QL REPORTED: NORMAL
GLOBULIN SER CALC-MCNC: 2.7 G/DL (ref 1.9–3.5)
GLUCOSE SERPL-MCNC: 110 MG/DL (ref 65–99)
HCT VFR BLD AUTO: 45.6 % (ref 37–47)
HGB BLD-MCNC: 15.2 G/DL (ref 12–16)
IMM GRANULOCYTES # BLD AUTO: 0.01 K/UL (ref 0–0.11)
IMM GRANULOCYTES NFR BLD AUTO: 0.2 % (ref 0–0.9)
LYMPHOCYTES # BLD AUTO: 2.3 K/UL (ref 1–4.8)
LYMPHOCYTES NFR BLD: 46.6 % (ref 22–41)
MCH RBC QN AUTO: 32.2 PG (ref 27–33)
MCHC RBC AUTO-ENTMCNC: 33.3 G/DL (ref 33.6–35)
MCV RBC AUTO: 96.6 FL (ref 81.4–97.8)
MONOCYTES # BLD AUTO: 0.3 K/UL (ref 0–0.85)
MONOCYTES NFR BLD AUTO: 6.1 % (ref 0–13.4)
NEUTROPHILS # BLD AUTO: 2.11 K/UL (ref 2–7.15)
NEUTROPHILS NFR BLD: 42.7 % (ref 44–72)
NRBC # BLD AUTO: 0 K/UL
NRBC BLD-RTO: 0 /100 WBC
PLATELET # BLD AUTO: 243 K/UL (ref 164–446)
PMV BLD AUTO: 10.6 FL (ref 9–12.9)
POTASSIUM SERPL-SCNC: 4 MMOL/L (ref 3.6–5.5)
PROT SERPL-MCNC: 7.3 G/DL (ref 6–8.2)
RBC # BLD AUTO: 4.72 M/UL (ref 4.2–5.4)
SODIUM SERPL-SCNC: 142 MMOL/L (ref 135–145)
WBC # BLD AUTO: 4.9 K/UL (ref 4.8–10.8)

## 2020-11-05 PROCEDURE — 82977 ASSAY OF GGT: CPT

## 2020-11-05 PROCEDURE — 36415 COLL VENOUS BLD VENIPUNCTURE: CPT

## 2020-11-05 PROCEDURE — 85025 COMPLETE CBC W/AUTO DIFF WBC: CPT

## 2020-11-05 PROCEDURE — 83516 IMMUNOASSAY NONANTIBODY: CPT

## 2020-11-05 PROCEDURE — 80053 COMPREHEN METABOLIC PANEL: CPT

## 2020-11-06 LAB — GGT SERPL-CCNC: 27 U/L (ref 7–34)

## 2020-11-07 LAB — MITOCHONDRIA M2 IGG SER-ACNC: 30.3 UNITS (ref 0–24.9)

## 2020-11-11 ENCOUNTER — OFFICE VISIT (OUTPATIENT)
Dept: MEDICAL GROUP | Facility: PHYSICIAN GROUP | Age: 55
End: 2020-11-11
Payer: COMMERCIAL

## 2020-11-11 VITALS
DIASTOLIC BLOOD PRESSURE: 76 MMHG | WEIGHT: 119 LBS | BODY MASS INDEX: 23.99 KG/M2 | OXYGEN SATURATION: 98 % | HEART RATE: 64 BPM | SYSTOLIC BLOOD PRESSURE: 108 MMHG | TEMPERATURE: 98 F | HEIGHT: 59 IN

## 2020-11-11 DIAGNOSIS — R73.01 FASTING HYPERGLYCEMIA: ICD-10-CM

## 2020-11-11 DIAGNOSIS — L71.9 ROSACEA: ICD-10-CM

## 2020-11-11 DIAGNOSIS — Z87.39 HISTORY OF RHABDOMYOLYSIS: ICD-10-CM

## 2020-11-11 DIAGNOSIS — R74.8 ELEVATED ALKALINE PHOSPHATASE LEVEL: ICD-10-CM

## 2020-11-11 DIAGNOSIS — E78.5 HYPERLIPIDEMIA, UNSPECIFIED HYPERLIPIDEMIA TYPE: ICD-10-CM

## 2020-11-11 PROCEDURE — 99214 OFFICE O/P EST MOD 30 MIN: CPT | Performed by: INTERNAL MEDICINE

## 2020-11-11 ASSESSMENT — FIBROSIS 4 INDEX: FIB4 SCORE: 1.25

## 2020-11-11 NOTE — PROGRESS NOTES
Chief Complaint   Patient presents with   • Lab Results   • Rash     bilateral on cheeks       HISTORY OF PRESENT ILLNESS: Patient is a 55 y.o. female established patient who presents today to discuss the medical issues below.    Patient here for laboratory follow-up.  She has concerns about a rash on her face.  States it is better currently in the wintertime.  Wonders if there is some treatment she should have for this.      Patient Active Problem List    Diagnosis Date Noted   • Transaminitis 06/04/2016     Priority: High   • Personal history of colonic polyps 01/09/2020     Priority: Medium   • Lichen sclerosus et atrophicus 10/11/2017     Priority: Medium   • Atrophic vaginitis 03/09/2017     Priority: Medium   • Menopause 03/09/2017     Priority: Medium   • History of rhabdomyolysis 06/04/2016     Priority: Medium   • Allergic rhinitis      Priority: Medium   • Hepatic hemangioma 06/29/2016     Priority: Low   • Rosacea 11/11/2020   • Pain of both elbows 08/03/2020   • Tinea corporis 02/03/2020   • Nail dystrophy 10/01/2019   • Herpes simplex 09/14/2018   • Abnormal urinalysis 06/04/2016   • Oral thrush 11/03/2015   • Hemorrhoids        Allergies:Patient has no known allergies.    Current Outpatient Medications   Medication Sig Dispense Refill   • estrogens, conjugated (PREMARIN) 0.625 MG/GM Cream Apply 1 g MWF x 2 weeks then q Sunday. 1 Tube 5   • ciclopirox (LOPROX) 0.77 % cream Apply to affected area bid x 4 weeks. 1 Tube 0   • vitamin D (CHOLECALCIFEROL) 1000 UNIT Tab Take 1,000 Units by mouth every day.     • Multiple Vitamins-Calcium (ONE-A-DAY WOMENS PO) Take  by mouth.     • CALCIUM 500 500 MG TABS Take 1 Tab by mouth 2 times a day, with meals.     • Loratadine (CLARITIN PO) Take  by mouth.     • DiphenhydrAMINE HCl (BENADRYL ALLERGY PO) Take  by mouth.       No current facility-administered medications for this visit.          Past Medical History:   Diagnosis Date   • Allergic rhinitis, cause  "unspecified    • Atrophic vaginitis 3/9/2017   • History of rhabdomyolysis 6/4/2016   • Irregular menses 10/8/2009   • Liver mass 6/29/2016   • Menopause 3/9/2017   • Oral thrush 11/3/2015   • Pap smear 8/18/08   • Rosacea     sees dermatologist in [place]   • Unspecified hemorrhoids without mention of complication    • Vaginitis 4/15/2015   • Varicose vein        Social History     Tobacco Use   • Smoking status: Never Smoker   • Smokeless tobacco: Never Used   Substance Use Topics   • Alcohol use: No     Alcohol/week: 0.0 oz   • Drug use: No       Family Status   Relation Name Status   • Mo  Alive        had a nervous breakdown otherwise healthy   • Fa  Alive        healthy   History reviewed. No pertinent family history.    ROS:    Respiratory: Negative for cough, sputum production, shortness of breath or wheezing.    Cardiovascular: Negative for chest pain, palpitations, orthopnea, dyspnea with exertion or edema.   Gastrointestinal: Negative for GI upset, nausea, vomiting, abdominal pain, constipation or diarrhea.   Genitourinary: Negative for dysuria, urgency, hesitancy or frequency.       Exam:    /76 (BP Location: Left arm, Patient Position: Sitting, BP Cuff Size: Adult)   Pulse 64   Temp 36.7 °C (98 °F) (Temporal)   Ht 1.499 m (4' 11\")   Wt 54 kg (119 lb)   SpO2 98%  Body mass index is 24.04 kg/m².  General:  Well nourished, well developed female in NAD.  Skin: Minimal erythema along the cheekbones bilaterally no telangiectasia or pustule papules.  Pulmonary: Clear to ausculation and percussion.  Normal effort. No rales, rhonchi, or wheezing.  Cardiovascular: Regular rate and rhythm without murmur.   Abdomen: Normal bowel sounds soft and nontender no palpable liver spleen bladder mass.  Extremities: No LE edema noted.  Neuro: Grossly nonfocal.  Psych: Alert and oriented to person, place, and time. Appropriate mood and conversation.    LABS: Results reviewed and discussed with the patient, questions " answered.      This dictation was created using voice recognition software. I have made reasonable attempts to correct errors, however, errors of grammar and content may exist.          Assessment/Plan:    1. Elevated alkaline phosphatase level  Significantly patient's alkaline phosphatase at 112.  She does have positive AMA at 30.3.  This is at a time with normal transaminases.  Her GGT is normal.  Long discussion held regarding the differential diagnosis including primary biliary cholangitis implications.  Patient and  opt for GI consultation regarding potential implications and recommendations for monitoring versus therapy.  She is completely asymptomatic currently.  - REFERRAL TO GASTROENTEROLOGY  - CBC WITH DIFFERENTIAL; Future    2. History of rhabdomyolysis  This is resolved.  Unsure if this is related to problem #1 above.  Was associated with a heavy workout.  Transaminases were substantially elevated at that time.  Consultation with hepatology.  - CBC WITH DIFFERENTIAL; Future    3. Fasting hyperglycemia  Borderline blood sugar discussed with patient avoidance of carbohydrates ongoing monitoring  - Comp Metabolic Panel; Future    4. Hyperlipidemia, unspecified hyperlipidemia type  Positive for hyperlipidemia.  Discussed diet, implications with the elevated alkaline phosphatase.  - Lipid Profile; Future    5. Rosacea  Minimal presentation today.  Referral to dermatology for management because of patient's concerns.  - REFERRAL TO DERMATOLOGY       Patient was seen for 25 minutes face to face of which more than 50% of the time was spent in counseling and coordination of care regarding the above problems.

## 2020-12-01 ENCOUNTER — APPOINTMENT (OUTPATIENT)
Dept: SCHEDULING | Facility: IMAGING CENTER | Age: 55
End: 2020-12-01
Payer: COMMERCIAL

## 2020-12-07 ENCOUNTER — HOSPITAL ENCOUNTER (OUTPATIENT)
Dept: LAB | Facility: MEDICAL CENTER | Age: 55
End: 2020-12-07
Attending: INTERNAL MEDICINE
Payer: COMMERCIAL

## 2020-12-07 PROCEDURE — 84075 ASSAY ALKALINE PHOSPHATASE: CPT

## 2020-12-07 PROCEDURE — 84080 ASSAY ALKALINE PHOSPHATASES: CPT

## 2020-12-07 PROCEDURE — 36415 COLL VENOUS BLD VENIPUNCTURE: CPT

## 2020-12-11 LAB
ALP BONE SERPL-CCNC: 69 U/L (ref 0–55)
ALP ISOS SERPL HS-CCNC: 0 U/L
ALP LIVER SERPL-CCNC: 30 U/L (ref 0–94)
ALP SERPL-CCNC: 99 U/L (ref 40–120)

## 2020-12-18 ENCOUNTER — HOSPITAL ENCOUNTER (OUTPATIENT)
Dept: RADIOLOGY | Facility: MEDICAL CENTER | Age: 55
End: 2020-12-18
Attending: INTERNAL MEDICINE
Payer: COMMERCIAL

## 2020-12-18 DIAGNOSIS — R79.89 ELEVATED LFTS: ICD-10-CM

## 2020-12-18 PROCEDURE — 76700 US EXAM ABDOM COMPLETE: CPT

## 2021-01-13 ENCOUNTER — HOSPITAL ENCOUNTER (OUTPATIENT)
Dept: LAB | Facility: MEDICAL CENTER | Age: 56
End: 2021-01-13
Attending: INTERNAL MEDICINE
Payer: COMMERCIAL

## 2021-01-13 LAB
ALBUMIN SERPL BCP-MCNC: 4.3 G/DL (ref 3.2–4.9)
ALBUMIN/GLOB SERPL: 1.9 G/DL
ALP SERPL-CCNC: 77 U/L (ref 30–99)
ALT SERPL-CCNC: 20 U/L (ref 2–50)
ANION GAP SERPL CALC-SCNC: 8 MMOL/L (ref 7–16)
AST SERPL-CCNC: 21 U/L (ref 12–45)
BILIRUB SERPL-MCNC: 0.4 MG/DL (ref 0.1–1.5)
BUN SERPL-MCNC: 28 MG/DL (ref 8–22)
CALCIUM SERPL-MCNC: 9 MG/DL (ref 8.5–10.5)
CHLORIDE SERPL-SCNC: 106 MMOL/L (ref 96–112)
CO2 SERPL-SCNC: 24 MMOL/L (ref 20–33)
CREAT SERPL-MCNC: 0.97 MG/DL (ref 0.5–1.4)
GLOBULIN SER CALC-MCNC: 2.3 G/DL (ref 1.9–3.5)
GLUCOSE SERPL-MCNC: 115 MG/DL (ref 65–99)
POTASSIUM SERPL-SCNC: 3.8 MMOL/L (ref 3.6–5.5)
PROT SERPL-MCNC: 6.6 G/DL (ref 6–8.2)
SODIUM SERPL-SCNC: 138 MMOL/L (ref 135–145)

## 2021-01-13 PROCEDURE — 80053 COMPREHEN METABOLIC PANEL: CPT

## 2021-01-13 PROCEDURE — 36415 COLL VENOUS BLD VENIPUNCTURE: CPT

## 2021-01-18 ENCOUNTER — OFFICE VISIT (OUTPATIENT)
Dept: MEDICAL GROUP | Facility: PHYSICIAN GROUP | Age: 56
End: 2021-01-18
Payer: COMMERCIAL

## 2021-01-18 VITALS
DIASTOLIC BLOOD PRESSURE: 68 MMHG | TEMPERATURE: 97.6 F | BODY MASS INDEX: 23.93 KG/M2 | HEIGHT: 58 IN | WEIGHT: 114 LBS | HEART RATE: 67 BPM | SYSTOLIC BLOOD PRESSURE: 100 MMHG | OXYGEN SATURATION: 97 %

## 2021-01-18 DIAGNOSIS — Z87.39 HISTORY OF RHABDOMYOLYSIS: ICD-10-CM

## 2021-01-18 DIAGNOSIS — R74.8 ELEVATED ALKALINE PHOSPHATASE LEVEL: ICD-10-CM

## 2021-01-18 DIAGNOSIS — R74.8 ALKALINE PHOSPHATASE ELEVATION: ICD-10-CM

## 2021-01-18 DIAGNOSIS — R73.01 FASTING HYPERGLYCEMIA: ICD-10-CM

## 2021-01-18 PROCEDURE — 99214 OFFICE O/P EST MOD 30 MIN: CPT | Performed by: INTERNAL MEDICINE

## 2021-01-18 ASSESSMENT — FIBROSIS 4 INDEX: FIB4 SCORE: 1.06

## 2021-01-18 ASSESSMENT — PATIENT HEALTH QUESTIONNAIRE - PHQ9: CLINICAL INTERPRETATION OF PHQ2 SCORE: 0

## 2021-01-18 NOTE — PROGRESS NOTES
Chief Complaint   Patient presents with   • Lab Results       HISTORY OF PRESENT ILLNESS: Patient is a 55 y.o. female established patient who presents today to discuss the medical issues below.    Alkaline phosphatase elevation  Patient has been seen by GI, fractionation of the alkaline phosphatase is positive for bone and now after repeat she is normal.  She has been trying to take her vitamin D on a regular basis now taking at least 5 times a day.  Feels fine no bone complaints no abdomen complaints.  Ultrasound of the abdomen negative      Patient Active Problem List    Diagnosis Date Noted   • Transaminitis 06/04/2016     Priority: High   • Personal history of colonic polyps 01/09/2020     Priority: Medium   • Lichen sclerosus et atrophicus 10/11/2017     Priority: Medium   • Atrophic vaginitis 03/09/2017     Priority: Medium   • Menopause 03/09/2017     Priority: Medium   • History of rhabdomyolysis 06/04/2016     Priority: Medium   • Allergic rhinitis      Priority: Medium   • Hepatic hemangioma 06/29/2016     Priority: Low   • Alkaline phosphatase elevation 01/18/2021   • Rosacea 11/11/2020   • Pain of both elbows 08/03/2020   • Tinea corporis 02/03/2020   • Nail dystrophy 10/01/2019   • Herpes simplex 09/14/2018   • Abnormal urinalysis 06/04/2016   • Oral thrush 11/03/2015   • Hemorrhoids        Allergies:Patient has no known allergies.    Current Outpatient Medications   Medication Sig Dispense Refill   • vitamin D (CHOLECALCIFEROL) 1000 UNIT Tab Take 1,000 Units by mouth every day.     • Multiple Vitamins-Calcium (ONE-A-DAY WOMENS PO) Take  by mouth.     • CALCIUM 500 500 MG TABS Take 1 Tab by mouth 2 times a day, with meals.     • Loratadine (CLARITIN PO) Take  by mouth.     • estrogens, conjugated (PREMARIN) 0.625 MG/GM Cream Apply 1 g MWF x 2 weeks then q Sunday. (Patient not taking: Reported on 1/18/2021) 1 Tube 5   • ciclopirox (LOPROX) 0.77 % cream Apply to affected area bid x 4 weeks. (Patient not  "taking: Reported on 1/18/2021) 1 Tube 0   • DiphenhydrAMINE HCl (BENADRYL ALLERGY PO) Take  by mouth.       No current facility-administered medications for this visit.          Past Medical History:   Diagnosis Date   • Allergic rhinitis, cause unspecified    • Atrophic vaginitis 3/9/2017   • History of rhabdomyolysis 6/4/2016   • Irregular menses 10/8/2009   • Liver mass 6/29/2016   • Menopause 3/9/2017   • Oral thrush 11/3/2015   • Pap smear 8/18/08   • Rosacea     sees dermatologist in [place]   • Unspecified hemorrhoids without mention of complication    • Vaginitis 4/15/2015   • Varicose vein        Social History     Tobacco Use   • Smoking status: Never Smoker   • Smokeless tobacco: Never Used   Substance Use Topics   • Alcohol use: No     Alcohol/week: 0.0 oz   • Drug use: No       Family Status   Relation Name Status   • Mo  Alive        had a nervous breakdown otherwise healthy   • Fa  Alive        healthy   History reviewed. No pertinent family history.    ROS:    Respiratory: Negative for cough, sputum production, shortness of breath or wheezing.    Cardiovascular: Negative for chest pain, palpitations, orthopnea, dyspnea with exertion or edema.   Gastrointestinal: Negative for GI upset, nausea, vomiting, abdominal pain, constipation or diarrhea.   Genitourinary: Negative for dysuria, urgency, hesitancy or frequency.       Exam:    /68 (BP Location: Left arm, Patient Position: Sitting, BP Cuff Size: Adult)   Pulse 67   Temp 36.4 °C (97.6 °F) (Temporal)   Ht 1.473 m (4' 10\")   Wt 51.7 kg (114 lb)   SpO2 97%  Body mass index is 23.83 kg/m².  General:  Well nourished, well developed female in NAD.  Pulmonary: Clear to ausculation and percussion.  Normal effort. No rales, rhonchi, or wheezing.  Cardiovascular: Regular rate and rhythm without murmur.   Abdomen: Normal bowel sounds soft and nontender no palpable liver spleen bladder mass.  Extremities: No LE edema noted.  Neuro: Grossly " nonfocal.  Psych: Alert and oriented to person, place, and time. Appropriate mood and conversation.    LABS: Results reviewed and discussed with the patient, questions answered.      This dictation was created using voice recognition software. I have made reasonable attempts to correct errors, however, errors of grammar and content may exist.          Assessment/Plan:    1. Elevated alkaline phosphatase level  Current labs indicate resolution of the elevated alkaline phosphatase.  Most likely bony in etiology.  Concern for potential osteoporosis  - DS-BONE DENSITY STUDY (DEXA); Future  - VITAMIN D,25 HYDROXY; Future    2. Fasting hyperglycemia  Postprandial glucose at 115 discussed decrease carbohydrates increase protein ongoing monitoring check A1c at next follow-up  - HEMOGLOBIN A1C; Future  - Comp Metabolic Panel; Future    3. History of rhabdomyolysis  Felt to be due to an over exertional workout.  Has been remaining with normal transaminases.  AMA is minimally positive however GI feels this is most likely not significant.  - CBC WITH DIFFERENTIAL; Future    4. Alkaline phosphatase elevation  Resolved concern for osteoporosis with proceed to bone density, maximize vitamin D.  Calcium intake discussed.  Thyroid assessed in April normal    Patient was seen for 25 minutes face to face of which more than 50% of the time was spent in counseling and coordination of care regarding the above problems.

## 2021-01-18 NOTE — ASSESSMENT & PLAN NOTE
Patient has been seen by GI, fractionation of the alkaline phosphatase is positive for bone and now after repeat she is normal.  She has been trying to take her vitamin D on a regular basis now taking at least 5 times a day.  Feels fine no bone complaints no abdomen complaints.  Ultrasound of the abdomen negative

## 2021-02-02 ENCOUNTER — APPOINTMENT (RX ONLY)
Dept: URBAN - NONMETROPOLITAN AREA CLINIC 15 | Facility: CLINIC | Age: 56
Setting detail: DERMATOLOGY
End: 2021-02-02

## 2021-02-02 DIAGNOSIS — L82.1 OTHER SEBORRHEIC KERATOSIS: ICD-10-CM

## 2021-02-02 DIAGNOSIS — L81.1 CHLOASMA: ICD-10-CM

## 2021-02-02 PROBLEM — D23.72 OTHER BENIGN NEOPLASM OF SKIN OF LEFT LOWER LIMB, INCLUDING HIP: Status: ACTIVE | Noted: 2021-02-02

## 2021-02-02 PROCEDURE — ? PRESCRIPTION

## 2021-02-02 PROCEDURE — ? MEDICATION COUNSELING

## 2021-02-02 PROCEDURE — ? COUNSELING

## 2021-02-02 PROCEDURE — 99202 OFFICE O/P NEW SF 15 MIN: CPT

## 2021-02-02 PROCEDURE — ? LIQUID NITROGEN

## 2021-02-02 RX ORDER — HYDROQUINONE 4 %
1 CREAM (GRAM) TOPICAL BID
Qty: 1 | Refills: 3 | Status: ERX | COMMUNITY
Start: 2021-02-02

## 2021-02-02 RX ORDER — TRETIONIN 0.5 MG/G
1 CREAM TOPICAL QHS
Qty: 1 | Refills: 6 | Status: ERX | COMMUNITY
Start: 2021-02-02

## 2021-02-02 RX ADMIN — Medication 1: at 00:00

## 2021-02-02 RX ADMIN — TRETIONIN 1: 0.5 CREAM TOPICAL at 00:00

## 2021-02-02 ASSESSMENT — LOCATION ZONE DERM
LOCATION ZONE: FACE
LOCATION ZONE: ARM

## 2021-02-02 ASSESSMENT — LOCATION DETAILED DESCRIPTION DERM
LOCATION DETAILED: RIGHT PROXIMAL POSTERIOR UPPER ARM
LOCATION DETAILED: RIGHT DISTAL POSTERIOR UPPER ARM
LOCATION DETAILED: LEFT CENTRAL MALAR CHEEK

## 2021-02-02 ASSESSMENT — LOCATION SIMPLE DESCRIPTION DERM
LOCATION SIMPLE: LEFT CHEEK
LOCATION SIMPLE: RIGHT POSTERIOR UPPER ARM

## 2021-02-02 NOTE — PROCEDURE: MEDICATION COUNSELING
Hydroxychloroquine Pregnancy And Lactation Text: This medication has been shown to cause fetal harm but it isn't assigned a Pregnancy Risk Category. There are small amounts excreted in breast milk.
Terbinafine Counseling: Patient counseling regarding adverse effects of terbinafine including but not limited to headache, diarrhea, rash, upset stomach, liver function test abnormalities, itching, taste/smell disturbance, nausea, abdominal pain, and flatulence.  There is a rare possibility of liver failure that can occur when taking terbinafine.  The patient understands that a baseline LFT and kidney function test may be required. The patient verbalized understanding of the proper use and possible adverse effects of terbinafine.  All of the patient's questions and concerns were addressed.
Detail Level: Simple
Simponi Counseling:  I discussed with the patient the risks of golimumab including but not limited to myelosuppression, immunosuppression, autoimmune hepatitis, demyelinating diseases, lymphoma, and serious infections.  The patient understands that monitoring is required including a PPD at baseline and must alert us or the primary physician if symptoms of infection or other concerning signs are noted.
Erythromycin Pregnancy And Lactation Text: This medication is Pregnancy Category B and is considered safe during pregnancy. It is also excreted in breast milk.
Sski Pregnancy And Lactation Text: This medication is Pregnancy Category D and isn't considered safe during pregnancy. It is excreted in breast milk.
High Dose Vitamin A Pregnancy And Lactation Text: High dose vitamin A therapy is contraindicated during pregnancy and breast feeding.
Hydroquinone Pregnancy And Lactation Text: This medication has not been assigned a Pregnancy Risk Category but animal studies failed to show danger with the topical medication. It is unknown if the medication is excreted in breast milk.
Griseofulvin Pregnancy And Lactation Text: This medication is Pregnancy Category X and is known to cause serious birth defects. It is unknown if this medication is excreted in breast milk but breast feeding should be avoided.
Cimzia Counseling:  I discussed with the patient the risks of Cimzia including but not limited to immunosuppression, allergic reactions and infections.  The patient understands that monitoring is required including a PPD at baseline and must alert us or the primary physician if symptoms of infection or other concerning signs are noted.
Tazorac Pregnancy And Lactation Text: This medication is not safe during pregnancy. It is unknown if this medication is excreted in breast milk.
Clofazimine Pregnancy And Lactation Text: This medication is Pregnancy Category C and isn't considered safe during pregnancy. It is excreted in breast milk.
Siliq Pregnancy And Lactation Text: The risk during pregnancy and breastfeeding is uncertain with this medication.
Cellcept Counseling:  I discussed with the patient the risks of mycophenolate mofetil including but not limited to infection/immunosuppression, GI upset, hypokalemia, hypercholesterolemia, bone marrow suppression, lymphoproliferative disorders, malignancy, GI ulceration/bleed/perforation, colitis, interstitial lung disease, kidney failure, progressive multifocal leukoencephalopathy, and birth defects.  The patient understands that monitoring is required including a baseline creatinine and regular CBC testing. In addition, patient must alert us immediately if symptoms of infection or other concerning signs are noted.
Metronidazole Counseling:  I discussed with the patient the risks of metronidazole including but not limited to seizures, nausea/vomiting, a metallic taste in the mouth, nausea/vomiting and severe allergy.
Imiquimod Pregnancy And Lactation Text: This medication is Pregnancy Category C. It is unknown if this medication is excreted in breast milk.
Niacinamide Counseling: I recommended taking niacin or niacinamide, also know as vitamin B3, twice daily. Recent evidence suggests that taking vitamin B3 (500 mg twice daily) can reduce the risk of actinic keratoses and non-melanoma skin cancers. Side effects of vitamin B3 include flushing and headache.
Itraconazole Counseling:  I discussed with the patient the risks of itraconazole including but not limited to liver damage, nausea/vomiting, neuropathy, and severe allergy.  The patient understands that this medication is best absorbed when taken with acidic beverages such as non-diet cola or ginger ale.  The patient understands that monitoring is required including baseline LFTs and repeat LFTs at intervals.  The patient understands that they are to contact us or the primary physician if concerning signs are noted.
Cimzia Pregnancy And Lactation Text: This medication crosses the placenta but can be considered safe in certain situations. Cimzia may be excreted in breast milk.
Thalidomide Counseling: I discussed with the patient the risks of thalidomide including but not limited to birth defects, anxiety, weakness, chest pain, dizziness, cough and severe allergy.
Cellcept Pregnancy And Lactation Text: This medication is Pregnancy Category D and isn't considered safe during pregnancy. It is unknown if this medication is excreted in breast milk.
Topical Clindamycin Counseling: Patient counseled that this medication may cause skin irritation or allergic reactions.  In the event of skin irritation, the patient was advised to reduce the amount of the drug applied or use it less frequently.   The patient verbalized understanding of the proper use and possible adverse effects of clindamycin.  All of the patient's questions and concerns were addressed.
Colchicine Counseling:  Patient counseled regarding adverse effects including but not limited to stomach upset (nausea, vomiting, stomach pain, or diarrhea).  Patient instructed to limit alcohol consumption while taking this medication.  Colchicine may reduce blood counts especially with prolonged use.  The patient understands that monitoring of kidney function and blood counts may be required, especially at baseline. The patient verbalized understanding of the proper use and possible adverse effects of colchicine.  All of the patient's questions and concerns were addressed.
Imiquimod Counseling:  I discussed with the patient the risks of imiquimod including but not limited to erythema, scaling, itching, weeping, crusting, and pain.  Patient understands that the inflammatory response to imiquimod is variable from person to person and was educated regarded proper titration schedule.  If flu-like symptoms develop, patient knows to discontinue the medication and contact us.
Dapsone Counseling: I discussed with the patient the risks of dapsone including but not limited to hemolytic anemia, agranulocytosis, rashes, methemoglobinemia, kidney failure, peripheral neuropathy, headaches, GI upset, and liver toxicity.  Patients who start dapsone require monitoring including baseline LFTs and weekly CBCs for the first month, then every month thereafter.  The patient verbalized understanding of the proper use and possible adverse effects of dapsone.  All of the patient's questions and concerns were addressed.
Skyrizi Counseling: I discussed with the patient the risks of risankizumab-rzaa including but not limited to immunosuppression, and serious infections.  The patient understands that monitoring is required including a PPD at baseline and must alert us or the primary physician if symptoms of infection or other concerning signs are noted.
Topical Sulfur Applications Counseling: Topical Sulfur Counseling: Patient counseled that this medication may cause skin irritation or allergic reactions.  In the event of skin irritation, the patient was advised to reduce the amount of the drug applied or use it less frequently.   The patient verbalized understanding of the proper use and possible adverse effects of topical sulfur application.  All of the patient's questions and concerns were addressed.
Metronidazole Pregnancy And Lactation Text: This medication is Pregnancy Category B and considered safe during pregnancy.  It is also excreted in breast milk.
Niacinamide Pregnancy And Lactation Text: These medications are considered safe during pregnancy.
Itraconazole Pregnancy And Lactation Text: This medication is Pregnancy Category C and it isn't know if it is safe during pregnancy. It is also excreted in breast milk.
Cosentyx Counseling:  I discussed with the patient the risks of Cosentyx including but not limited to worsening of Crohn's disease, immunosuppression, allergic reactions and infections.  The patient understands that monitoring is required including a PPD at baseline and must alert us or the primary physician if symptoms of infection or other concerning signs are noted.
Terbinafine Pregnancy And Lactation Text: This medication is Pregnancy Category B and is considered safe during pregnancy. It is also excreted in breast milk and breast feeding isn't recommended.
Topical Clindamycin Pregnancy And Lactation Text: This medication is Pregnancy Category B and is considered safe during pregnancy. It is unknown if it is excreted in breast milk.
Thalidomide Pregnancy And Lactation Text: This medication is Pregnancy Category X and is absolutely contraindicated during pregnancy. It is unknown if it is excreted in breast milk.
Cyclophosphamide Counseling:  I discussed with the patient the risks of cyclophosphamide including but not limited to hair loss, hormonal abnormalities, decreased fertility, abdominal pain, diarrhea, nausea and vomiting, bone marrow suppression and infection. The patient understands that monitoring is required while taking this medication.
Ketoconazole Pregnancy And Lactation Text: This medication is Pregnancy Category C and it isn't know if it is safe during pregnancy. It is also excreted in breast milk and breast feeding isn't recommended.
Cyclosporine Counseling:  I discussed with the patient the risks of cyclosporine including but not limited to hypertension, gingival hyperplasia,myelosuppression, immunosuppression, liver damage, kidney damage, neurotoxicity, lymphoma, and serious infections. The patient understands that monitoring is required including baseline blood pressure, CBC, CMP, lipid panel and uric acid, and then 1-2 times monthly CMP and blood pressure.
Benzoyl Peroxide Pregnancy And Lactation Text: This medication is Pregnancy Category C. It is unknown if benzoyl peroxide is excreted in breast milk.
Cimetidine Counseling:  I discussed with the patient the risks of Cimetidine including but not limited to gynecomastia, headache, diarrhea, nausea, drowsiness, arrhythmias, pancreatitis, skin rashes, psychosis, bone marrow suppression and kidney toxicity.
Topical Sulfur Applications Pregnancy And Lactation Text: This medication is Pregnancy Category C and has an unknown safety profile during pregnancy. It is unknown if this topical medication is excreted in breast milk.
Dapsone Pregnancy And Lactation Text: This medication is Pregnancy Category C and is not considered safe during pregnancy or breast feeding.
Minocycline Counseling: Patient advised regarding possible photosensitivity and discoloration of the teeth, skin, lips, tongue and gums.  Patient instructed to avoid sunlight, if possible.  When exposed to sunlight, patients should wear protective clothing, sunglasses, and sunscreen.  The patient was instructed to call the office immediately if the following severe adverse effects occur:  hearing changes, easy bruising/bleeding, severe headache, or vision changes.  The patient verbalized understanding of the proper use and possible adverse effects of minocycline.  All of the patient's questions and concerns were addressed.
Nsaids Counseling: NSAID Counseling: I discussed with the patient that NSAIDs should be taken with food. Prolonged use of NSAIDs can result in the development of stomach ulcers.  Patient advised to stop taking NSAIDs if abdominal pain occurs.  The patient verbalized understanding of the proper use and possible adverse effects of NSAIDs.  All of the patient's questions and concerns were addressed.
Cosentyx Pregnancy And Lactation Text: This medication is Pregnancy Category B and is considered safe during pregnancy. It is unknown if this medication is excreted in breast milk.
Benzoyl Peroxide Counseling: Patient counseled that medicine may cause skin irritation and bleach clothing.  In the event of skin irritation, the patient was advised to reduce the amount of the drug applied or use it less frequently.   The patient verbalized understanding of the proper use and possible adverse effects of benzoyl peroxide.  All of the patient's questions and concerns were addressed.
Ketoconazole Counseling:   Patient counseled regarding improving absorption with orange juice.  Adverse effects include but are not limited to breast enlargement, headache, diarrhea, nausea, upset stomach, liver function test abnormalities, taste disturbance, and stomach pain.  There is a rare possibility of liver failure that can occur when taking ketoconazole. The patient understands that monitoring of LFTs may be required, especially at baseline. The patient verbalized understanding of the proper use and possible adverse effects of ketoconazole.  All of the patient's questions and concerns were addressed.
Cyclophosphamide Pregnancy And Lactation Text: This medication is Pregnancy Category D and it isn't considered safe during pregnancy. This medication is excreted in breast milk.
Minoxidil Counseling: Minoxidil is a topical medication which can increase blood flow where it is applied. It is uncertain how this medication increases hair growth. Side effects are uncommon and include stinging and allergic reactions.
Tranexamic Acid Counseling:  Patient advised of the small risk of bleeding problems with tranexamic acid. They were also instructed to call if they developed any nausea, vomiting or diarrhea. All of the patient's questions and concerns were addressed.
Include Pregnancy/Lactation Warning?: No
Mirvaso Counseling: Mirvaso is a topical medication which can decrease superficial blood flow where applied. Side effects are uncommon and include stinging, redness and allergic reactions.
Carac Counseling:  I discussed with the patient the risks of Carac including but not limited to erythema, scaling, itching, weeping, crusting, and pain.
Dupixent Pregnancy And Lactation Text: This medication likely crosses the placenta but the risk for the fetus is uncertain. This medication is excreted in breast milk.
Stelara Counseling:  I discussed with the patient the risks of ustekinumab including but not limited to immunosuppression, malignancy, posterior leukoencephalopathy syndrome, and serious infections.  The patient understands that monitoring is required including a PPD at baseline and must alert us or the primary physician if symptoms of infection or other concerning signs are noted.
Wartpeel Counseling:  I discussed with the patient the risks of Wartpeel including but not limited to erythema, scaling, itching, weeping, crusting, and pain.
Erivedge Counseling- I discussed with the patient the risks of Erivedge including but not limited to nausea, vomiting, diarrhea, constipation, weight loss, changes in the sense of taste, decreased appetite, muscle spasms, and hair loss.  The patient verbalized understanding of the proper use and possible adverse effects of Erivedge.  All of the patient's questions and concerns were addressed.
Minocycline Pregnancy And Lactation Text: This medication is Pregnancy Category D and not consider safe during pregnancy. It is also excreted in breast milk.
Tranexamic Acid Pregnancy And Lactation Text: It is unknown if this medication is safe during pregnancy or breast feeding.
Nsaids Pregnancy And Lactation Text: These medications are considered safe up to 30 weeks gestation. It is excreted in breast milk.
Dupixent Counseling: I discussed with the patient the risks of dupilumab including but not limited to eye infection and irritation, cold sores, injection site reactions, worsening of asthma, allergic reactions and increased risk of parasitic infection.  Live vaccines should be avoided while taking dupilumab. Dupilumab will also interact with certain medications such as warfarin and cyclosporine. The patient understands that monitoring is required and they must alert us or the primary physician if symptoms of infection or other concerning signs are noted.
Valtrex Pregnancy And Lactation Text: this medication is Pregnancy Category B and is considered safe during pregnancy. This medication is not directly found in breast milk but it's metabolite acyclovir is present.
Carac Pregnancy And Lactation Text: This medication is Pregnancy Category X and contraindicated in pregnancy and in women who may become pregnant. It is unknown if this medication is excreted in breast milk.
Methotrexate Counseling:  Patient counseled regarding adverse effects of methotrexate including but not limited to nausea, vomiting, abnormalities in liver function tests. Patients may develop mouth sores, rash, diarrhea, and abnormalities in blood counts. The patient understands that monitoring is required including LFT's and blood counts.  There is a rare possibility of scarring of the liver and lung problems that can occur when taking methotrexate. Persistent nausea, loss of appetite, pale stools, dark urine, cough, and shortness of breath should be reported immediately. Patient advised to discontinue methotrexate treatment at least three months before attempting to become pregnant.  I discussed the need for folate supplements while taking methotrexate.  These supplements can decrease side effects during methotrexate treatment. The patient verbalized understanding of the proper use and possible adverse effects of methotrexate.  All of the patient's questions and concerns were addressed.
Mirvaso Pregnancy And Lactation Text: This medication has not been assigned a Pregnancy Risk Category. It is unknown if the medication is excreted in breast milk.
Quinolones Counseling:  I discussed with the patient the risks of fluoroquinolones including but not limited to GI upset, allergic reaction, drug rash, diarrhea, dizziness, photosensitivity, yeast infections, liver function test abnormalities, tendonitis/tendon rupture.
Doxepin Counseling:  Patient advised that the medication is sedating and not to drive a car after taking this medication. Patient informed of potential adverse effects including but not limited to dry mouth, urinary retention, and blurry vision.  The patient verbalized understanding of the proper use and possible adverse effects of doxepin.  All of the patient's questions and concerns were addressed.
Cyclosporine Pregnancy And Lactation Text: This medication is Pregnancy Category C and it isn't know if it is safe during pregnancy. This medication is excreted in breast milk.
Odomzo Counseling- I discussed with the patient the risks of Odomzo including but not limited to nausea, vomiting, diarrhea, constipation, weight loss, changes in the sense of taste, decreased appetite, muscle spasms, and hair loss.  The patient verbalized understanding of the proper use and possible adverse effects of Odomzo.  All of the patient's questions and concerns were addressed.
Valtrex Counseling: I discussed with the patient the risks of valacyclovir including but not limited to kidney damage, nausea, vomiting and severe allergy.  The patient understands that if the infection seems to be worsening or is not improving, they are to call.
Calcipotriene Counseling:  I discussed with the patient the risks of calcipotriene including but not limited to erythema, scaling, itching, and irritation.
Doxepin Pregnancy And Lactation Text: This medication is Pregnancy Category C and it isn't known if it is safe during pregnancy. It is also excreted in breast milk and breast feeding isn't recommended.
Azithromycin Counseling:  I discussed with the patient the risks of azithromycin including but not limited to GI upset, allergic reaction, drug rash, diarrhea, and yeast infections.
Picato Counseling:  I discussed with the patient the risks of Picato including but not limited to erythema, scaling, itching, weeping, crusting, and pain.
Methotrexate Pregnancy And Lactation Text: This medication is Pregnancy Category X and is known to cause fetal harm. This medication is excreted in breast milk.
Taltz Counseling: I discussed with the patient the risks of ixekizumab including but not limited to immunosuppression, serious infections, worsening of inflammatory bowel disease and drug reactions.  The patient understands that monitoring is required including a PPD at baseline and must alert us or the primary physician if symptoms of infection or other concerning signs are noted.
Zyclara Counseling:  I discussed with the patient the risks of imiquimod including but not limited to erythema, scaling, itching, weeping, crusting, and pain.  Patient understands that the inflammatory response to imiquimod is variable from person to person and was educated regarded proper titration schedule.  If flu-like symptoms develop, patient knows to discontinue the medication and contact us.
Enbrel Counseling:  I discussed with the patient the risks of etanercept including but not limited to myelosuppression, immunosuppression, autoimmune hepatitis, demyelinating diseases, lymphoma, and infections.  The patient understands that monitoring is required including a PPD at baseline and must alert us or the primary physician if symptoms of infection or other concerning signs are noted.
Otezla Pregnancy And Lactation Text: This medication is Pregnancy Category C and it isn't known if it is safe during pregnancy. It is unknown if it is excreted in breast milk.
Humira Counseling:  I discussed with the patient the risks of adalimumab including but not limited to myelosuppression, immunosuppression, autoimmune hepatitis, demyelinating diseases, lymphoma, and serious infections.  The patient understands that monitoring is required including a PPD at baseline and must alert us or the primary physician if symptoms of infection or other concerning signs are noted.
Prednisone Counseling:  I discussed with the patient the risks of prolonged use of prednisone including but not limited to weight gain, insomnia, osteoporosis, mood changes, diabetes, susceptibility to infection, glaucoma and high blood pressure.  In cases where prednisone use is prolonged, patients should be monitored with blood pressure checks, serum glucose levels and an eye exam.  Additionally, the patient may need to be placed on GI prophylaxis, PCP prophylaxis, and calcium and vitamin D supplementation and/or a bisphosphonate.  The patient verbalized understanding of the proper use and the possible adverse effects of prednisone.  All of the patient's questions and concerns were addressed.
Azithromycin Pregnancy And Lactation Text: This medication is considered safe during pregnancy and is also secreted in breast milk.
Calcipotriene Pregnancy And Lactation Text: This medication has not been proven safe during pregnancy. It is unknown if this medication is excreted in breast milk.
Hydroxyzine Counseling: Patient advised that the medication is sedating and not to drive a car after taking this medication.  Patient informed of potential adverse effects including but not limited to dry mouth, urinary retention, and blurry vision.  The patient verbalized understanding of the proper use and possible adverse effects of hydroxyzine.  All of the patient's questions and concerns were addressed.
Rifampin Counseling: I discussed with the patient the risks of rifampin including but not limited to liver damage, kidney damage, red-orange body fluids, nausea/vomiting and severe allergy.
Otezla Counseling: The side effects of Otezla were discussed with the patient, including but not limited to worsening or new depression, weight loss, diarrhea, nausea, upper respiratory tract infection, and headache. Patient instructed to call the office should any adverse effect occur.  The patient verbalized understanding of the proper use and possible adverse effects of Otezla.  All the patient's questions and concerns were addressed.
Protopic Pregnancy And Lactation Text: This medication is Pregnancy Category C. It is unknown if this medication is excreted in breast milk when applied topically.
Oxybutynin Counseling:  I discussed with the patient the risks of oxybutynin including but not limited to skin rash, drowsiness, dry mouth, difficulty urinating, and blurred vision.
Bactrim Counseling:  I discussed with the patient the risks of sulfa antibiotics including but not limited to GI upset, allergic reaction, drug rash, diarrhea, dizziness, photosensitivity, and yeast infections.  Rarely, more serious reactions can occur including but not limited to aplastic anemia, agranulocytosis, methemoglobinemia, blood dyscrasias, liver or kidney failure, lung infiltrates or desquamative/blistering drug rashes.
Tremfya Counseling: I discussed with the patient the risks of guselkumab including but not limited to immunosuppression, serious infections, worsening of inflammatory bowel disease and drug reactions.  The patient understands that monitoring is required including a PPD at baseline and must alert us or the primary physician if symptoms of infection or other concerning signs are noted.
5-Fu Counseling: 5-Fluorouracil Counseling:  I discussed with the patient the risks of 5-fluorouracil including but not limited to erythema, scaling, itching, weeping, crusting, and pain.
Protopic Counseling: Patient may experience a mild burning sensation during topical application. Protopic is not approved in children less than 2 years of age. There have been case reports of hematologic and skin malignancies in patients using topical calcineurin inhibitors although causality is questionable.
Rifampin Pregnancy And Lactation Text: This medication is Pregnancy Category C and it isn't know if it is safe during pregnancy. It is also excreted in breast milk and should not be used if you are breast feeding.
Hydroxyzine Pregnancy And Lactation Text: This medication is not safe during pregnancy and should not be taken. It is also excreted in breast milk and breast feeding isn't recommended.
Xeljanz Counseling: I discussed with the patient the risks of Xeljanz therapy including increased risk of infection, liver issues, headache, diarrhea, or cold symptoms. Live vaccines should be avoided. They were instructed to call if they have any problems.
Opioid Pregnancy And Lactation Text: These medications can lead to premature delivery and should be avoided during pregnancy. These medications are also present in breast milk in small amounts.
Ilumya Counseling: I discussed with the patient the risks of tildrakizumab including but not limited to immunosuppression, malignancy, posterior leukoencephalopathy syndrome, and serious infections.  The patient understands that monitoring is required including a PPD at baseline and must alert us or the primary physician if symptoms of infection or other concerning signs are noted.
Bactrim Pregnancy And Lactation Text: This medication is Pregnancy Category D and is known to cause fetal risk.  It is also excreted in breast milk.
Sarecycline Counseling: Patient advised regarding possible photosensitivity and discoloration of the teeth, skin, lips, tongue and gums.  Patient instructed to avoid sunlight, if possible.  When exposed to sunlight, patients should wear protective clothing, sunglasses, and sunscreen.  The patient was instructed to call the office immediately if the following severe adverse effects occur:  hearing changes, easy bruising/bleeding, severe headache, or vision changes.  The patient verbalized understanding of the proper use and possible adverse effects of sarecycline.  All of the patient's questions and concerns were addressed.
Opioid Counseling: I discussed with the patient the potential side effects of opioids including but not limited to addiction, altered mental status, and depression. I stressed avoiding alcohol, benzodiazepines, muscle relaxants and sleep aids unless specifically okayed by a physician. The patient verbalized understanding of the proper use and possible adverse effects of opioids. All of the patient's questions and concerns were addressed. They were instructed to flush the remaining pills down the toilet if they did not need them for pain.
Xelrhondaz Pregnancy And Lactation Text: This medication is Pregnancy Category D and is not considered safe during pregnancy.  The risk during breast feeding is also uncertain.
Tetracycline Counseling: Patient counseled regarding possible photosensitivity and increased risk for sunburn.  Patient instructed to avoid sunlight, if possible.  When exposed to sunlight, patients should wear protective clothing, sunglasses, and sunscreen.  The patient was instructed to call the office immediately if the following severe adverse effects occur:  hearing changes, easy bruising/bleeding, severe headache, or vision changes.  The patient verbalized understanding of the proper use and possible adverse effects of tetracycline.  All of the patient's questions and concerns were addressed. Patient understands to avoid pregnancy while on therapy due to potential birth defects.
Drysol Pregnancy And Lactation Text: This medication is considered safe during pregnancy and breast feeding.
Finasteride Male Counseling: Finasteride Counseling:  I discussed with the patient the risks of use of finasteride including but not limited to decreased libido, decreased ejaculate volume, gynecomastia, and depression. Women should not handle medication.  All of the patient's questions and concerns were addressed.
Cephalexin Counseling: I counseled the patient regarding use of cephalexin as an antibiotic for prophylactic and/or therapeutic purposes. Cephalexin (commonly prescribed under brand name Keflex) is a cephalosporin antibiotic which is active against numerous classes of bacteria, including most skin bacteria. Side effects may include nausea, diarrhea, gastrointestinal upset, rash, hives, yeast infections, and in rare cases, hepatitis, kidney disease, seizures, fever, confusion, neurologic symptoms, and others. Patients with severe allergies to penicillin medications are cautioned that there is about a 10% incidence of cross-reactivity with cephalosporins. When possible, patients with penicillin allergies should use alternatives to cephalosporins for antibiotic therapy.
Propranolol Counseling:  I discussed with the patient the risks of propranolol including but not limited to low heart rate, low blood pressure, low blood sugar, restlessness and increased cold sensitivity. They should call the office if they experience any of these side effects.
Albendazole Counseling:  I discussed with the patient the risks of albendazole including but not limited to cytopenia, kidney damage, nausea/vomiting and severe allergy.  The patient understands that this medication is being used in an off-label manner.
Acitretin Counseling:  I discussed with the patient the risks of acitretin including but not limited to hair loss, dry lips/skin/eyes, liver damage, hyperlipidemia, depression/suicidal ideation, photosensitivity.  Serious rare side effects can include but are not limited to pancreatitis, pseudotumor cerebri, bony changes, clot formation/stroke/heart attack.  Patient understands that alcohol is contraindicated since it can result in liver toxicity and significantly prolong the elimination of the drug by many years.
Rhofade Counseling: Rhofade is a topical medication which can decrease superficial blood flow where applied. Side effects are uncommon and include stinging, redness and allergic reactions.
Drysol Counseling:  I discussed with the patient the risks of drysol/aluminum chloride including but not limited to skin rash, itching, irritation, burning.
Solaraze Counseling:  I discussed with the patient the risks of Solaraze including but not limited to erythema, scaling, itching, weeping, crusting, and pain.
Elidel Counseling: Patient may experience a mild burning sensation during topical application. Elidel is not approved in children less than 2 years of age. There have been case reports of hematologic and skin malignancies in patients using topical calcineurin inhibitors although causality is questionable.
Ivermectin Counseling:  Patient instructed to take medication on an empty stomach with a full glass of water.  Patient informed of potential adverse effects including but not limited to nausea, diarrhea, dizziness, itching, and swelling of the extremities or lymph nodes.  The patient verbalized understanding of the proper use and possible adverse effects of ivermectin.  All of the patient's questions and concerns were addressed.
Infliximab Counseling:  I discussed with the patient the risks of infliximab including but not limited to myelosuppression, immunosuppression, autoimmune hepatitis, demyelinating diseases, lymphoma, and serious infections.  The patient understands that monitoring is required including a PPD at baseline and must alert us or the primary physician if symptoms of infection or other concerning signs are noted.
Arava Counseling:  Patient counseled regarding adverse effects of Arava including but not limited to nausea, vomiting, abnormalities in liver function tests. Patients may develop mouth sores, rash, diarrhea, and abnormalities in blood counts. The patient understands that monitoring is required including LFTs and blood counts.  There is a rare possibility of scarring of the liver and lung problems that can occur when taking methotrexate. Persistent nausea, loss of appetite, pale stools, dark urine, cough, and shortness of breath should be reported immediately. Patient advised to discontinue Arava treatment and consult with a physician prior to attempting conception. The patient will have to undergo a treatment to eliminate Arava from the body prior to conception.
Finasteride Pregnancy And Lactation Text: This medication is absolutely contraindicated during pregnancy. It is unknown if it is excreted in breast milk.
Acitretin Pregnancy And Lactation Text: This medication is Pregnancy Category X and should not be given to women who are pregnant or may become pregnant in the future. This medication is excreted in breast milk.
Cephalexin Pregnancy And Lactation Text: This medication is Pregnancy Category B and considered safe during pregnancy.  It is also excreted in breast milk but can be used safely for shorter doses.
Albendazole Pregnancy And Lactation Text: This medication is Pregnancy Category C and it isn't known if it is safe during pregnancy. It is also excreted in breast milk.
Propranolol Pregnancy And Lactation Text: This medication is Pregnancy Category C and it isn't known if it is safe during pregnancy. It is excreted in breast milk.
Birth Control Pills Pregnancy And Lactation Text: This medication should be avoided if pregnant and for the first 30 days post-partum.
Xolair Pregnancy And Lactation Text: This medication is Pregnancy Category B and is considered safe during pregnancy. This medication is excreted in breast milk.
Bexarotene Pregnancy And Lactation Text: This medication is Pregnancy Category X and should not be given to women who are pregnant or may become pregnant. This medication should not be used if you are breast feeding.
Solaraze Pregnancy And Lactation Text: This medication is Pregnancy Category B and is considered safe. There is some data to suggest avoiding during the third trimester. It is unknown if this medication is excreted in breast milk.
Gabapentin Counseling: I discussed with the patient the risks of gabapentin including but not limited to dizziness, somnolence, fatigue and ataxia.
Xolair Counseling:  Patient informed of potential adverse effects including but not limited to fever, muscle aches, rash and allergic reactions.  The patient verbalized understanding of the proper use and possible adverse effects of Xolair.  All of the patient's questions and concerns were addressed.
Bexarotene Counseling:  I discussed with the patient the risks of bexarotene including but not limited to hair loss, dry lips/skin/eyes, liver abnormalities, hyperlipidemia, pancreatitis, depression/suicidal ideation, photosensitivity, drug rash/allergic reactions, hypothyroidism, anemia, leukopenia, infection, cataracts, and teratogenicity.  Patient understands that they will need regular blood tests to check lipid profile, liver function tests, white blood cell count, thyroid function tests and pregnancy test if applicable.
Clindamycin Counseling: I counseled the patient regarding use of clindamycin as an antibiotic for prophylactic and/or therapeutic purposes. Clindamycin is active against numerous classes of bacteria, including skin bacteria. Side effects may include nausea, diarrhea, gastrointestinal upset, rash, hives, yeast infections, and in rare cases, colitis.
Birth Control Pills Counseling: Birth Control Pill Counseling: I discussed with the patient the potential side effects of OCPs including but not limited to increased risk of stroke, heart attack, thrombophlebitis, deep venous thrombosis, hepatic adenomas, breast changes, GI upset, headaches, and depression.  The patient verbalized understanding of the proper use and possible adverse effects of OCPs. All of the patient's questions and concerns were addressed.
Eucrisa Counseling: Patient may experience a mild burning sensation during topical application. Eucrisa is not approved in children less than 2 years of age.
Isotretinoin Counseling: Patient should get monthly blood tests, not donate blood, not drive at night if vision affected, not share medication, and not undergo elective surgery for 6 months after tx completed. Side effects reviewed, pt to contact office should one occur.
Fluconazole Counseling:  Patient counseled regarding adverse effects of fluconazole including but not limited to headache, diarrhea, nausea, upset stomach, liver function test abnormalities, taste disturbance, and stomach pain.  There is a rare possibility of liver failure that can occur when taking fluconazole.  The patient understands that monitoring of LFTs and kidney function test may be required, especially at baseline. The patient verbalized understanding of the proper use and possible adverse effects of fluconazole.  All of the patient's questions and concerns were addressed.
Topical Retinoid counseling:  Patient advised to apply a pea-sized amount only at bedtime and wait 30 minutes after washing their face before applying.  If too drying, patient may add a non-comedogenic moisturizer. The patient verbalized understanding of the proper use and possible adverse effects of retinoids.  All of the patient's questions and concerns were addressed.
Clindamycin Pregnancy And Lactation Text: This medication can be used in pregnancy if certain situations. Clindamycin is also present in breast milk.
Rituxan Counseling:  I discussed with the patient the risks of Rituxan infusions. Side effects can include infusion reactions, severe drug rashes including mucocutaneous reactions, reactivation of latent hepatitis and other infections and rarely progressive multifocal leukoencephalopathy.  All of the patient's questions and concerns were addressed.
Doxycycline Pregnancy And Lactation Text: This medication is Pregnancy Category D and not consider safe during pregnancy. It is also excreted in breast milk but is considered safe for shorter treatment courses.
Spironolactone Pregnancy And Lactation Text: This medication can cause feminization of the male fetus and should be avoided during pregnancy. The active metabolite is also found in breast milk.
Glycopyrrolate Pregnancy And Lactation Text: This medication is Pregnancy Category B and is considered safe during pregnancy. It is unknown if it is excreted breast milk.
Isotretinoin Pregnancy And Lactation Text: This medication is Pregnancy Category X and is considered extremely dangerous during pregnancy. It is unknown if it is excreted in breast milk.
Azathioprine Counseling:  I discussed with the patient the risks of azathioprine including but not limited to myelosuppression, immunosuppression, hepatotoxicity, lymphoma, and infections.  The patient understands that monitoring is required including baseline LFTs, Creatinine, possible TPMP genotyping and weekly CBCs for the first month and then every 2 weeks thereafter.  The patient verbalized understanding of the proper use and possible adverse effects of azathioprine.  All of the patient's questions and concerns were addressed.
Rituxan Pregnancy And Lactation Text: This medication is Pregnancy Category C and it isn't know if it is safe during pregnancy. It is unknown if this medication is excreted in breast milk but similar antibodies are known to be excreted.
Doxycycline Counseling:  Patient counseled regarding possible photosensitivity and increased risk for sunburn.  Patient instructed to avoid sunlight, if possible.  When exposed to sunlight, patients should wear protective clothing, sunglasses, and sunscreen.  The patient was instructed to call the office immediately if the following severe adverse effects occur:  hearing changes, easy bruising/bleeding, severe headache, or vision changes.  The patient verbalized understanding of the proper use and possible adverse effects of doxycycline.  All of the patient's questions and concerns were addressed.
Glycopyrrolate Counseling:  I discussed with the patient the risks of glycopyrrolate including but not limited to skin rash, drowsiness, dry mouth, difficulty urinating, and blurred vision.
Spironolactone Counseling: Patient advised regarding risks of diarrhea, abdominal pain, hyperkalemia, birth defects (for female patients), liver toxicity and renal toxicity. The patient may need blood work to monitor liver and kidney function and potassium levels while on therapy. The patient verbalized understanding of the proper use and possible adverse effects of spironolactone.  All of the patient's questions and concerns were addressed.
High Dose Vitamin A Counseling: Side effects reviewed, pt to contact office should one occur.
SSKI Counseling:  I discussed with the patient the risks of SSKI including but not limited to thyroid abnormalities, metallic taste, GI upset, fever, headache, acne, arthralgias, paraesthesias, lymphadenopathy, easy bleeding, arrhythmias, and allergic reaction.
Erythromycin Counseling:  I discussed with the patient the risks of erythromycin including but not limited to GI upset, allergic reaction, drug rash, diarrhea, increase in liver enzymes, and yeast infections.
Hydroxychloroquine Counseling:  I discussed with the patient that a baseline ophthalmologic exam is needed at the start of therapy and every year thereafter while on therapy. A CBC may also be warranted for monitoring.  The side effects of this medication were discussed with the patient, including but not limited to agranulocytosis, aplastic anemia, seizures, rashes, retinopathy, and liver toxicity. Patient instructed to call the office should any adverse effect occur.  The patient verbalized understanding of the proper use and possible adverse effects of Plaquenil.  All the patient's questions and concerns were addressed.
Clofazimine Counseling:  I discussed with the patient the risks of clofazimine including but not limited to skin and eye pigmentation, liver damage, nausea/vomiting, gastrointestinal bleeding and allergy.
Griseofulvin Counseling:  I discussed with the patient the risks of griseofulvin including but not limited to photosensitivity, cytopenia, liver damage, nausea/vomiting and severe allergy.  The patient understands that this medication is best absorbed when taken with a fatty meal (e.g., ice cream or french fries).
Siliq Counseling:  I discussed with the patient the risks of Siliq including but not limited to new or worsening depression, suicidal thoughts and behavior, immunosuppression, malignancy, posterior leukoencephalopathy syndrome, and serious infections.  The patient understands that monitoring is required including a PPD at baseline and must alert us or the primary physician if symptoms of infection or other concerning signs are noted. There is also a special program designed to monitor depression which is required with Siliq.
Hydroquinone Counseling:  Patient advised that medication may result in skin irritation, lightening (hypopigmentation), dryness, and burning.  In the event of skin irritation, the patient was advised to reduce the amount of the drug applied or use it less frequently.  Rarely, spots that are treated with hydroquinone can become darker (pseudoochronosis).  Should this occur, patient instructed to stop medication and call the office. The patient verbalized understanding of the proper use and possible adverse effects of hydroquinone.  All of the patient's questions and concerns were addressed.
Tazorac Counseling:  Patient advised that medication is irritating and drying.  Patient may need to apply sparingly and wash off after an hour before eventually leaving it on overnight.  The patient verbalized understanding of the proper use and possible adverse effects of tazorac.  All of the patient's questions and concerns were addressed.

## 2021-02-02 NOTE — PROCEDURE: LIQUID NITROGEN
Duration Of Freeze Thaw-Cycle (Seconds): 0
Render Note In Bullet Format When Appropriate: No
Post-Care Instructions: I reviewed with the patient in detail post-care instructions. Patient is to wear sunprotection, and avoid picking at any of the treated lesions. Pt may apply Vaseline to crusted or scabbing areas.
Medical Necessity Clause: This procedure was medically necessary because the lesions that were treated were:  If lesion does not resolve, bx is needed.
Detail Level: Detailed
Consent: The patient's consent was obtained including but not limited to risks of crusting, scabbing, blistering, scarring, darker or lighter pigmentary change, recurrence, incomplete removal and infection.
Medical Necessity Information: It is in your best interest to select a reason for this procedure from the list below. All of these items fulfill various CMS LCD requirements except the new and changing color options.

## 2021-02-02 NOTE — HPI: RASH (ROSACEA)
How Severe Is Your Rosacea?: moderate
Is This A New Presentation, Or A Follow-Up?: Rosacea
Additional History: Ref Gertrude Armenta. Says sun makes it worse.

## 2021-02-05 ENCOUNTER — HOSPITAL ENCOUNTER (OUTPATIENT)
Dept: RADIOLOGY | Facility: MEDICAL CENTER | Age: 56
End: 2021-02-05
Attending: INTERNAL MEDICINE
Payer: COMMERCIAL

## 2021-02-05 DIAGNOSIS — R74.8 ELEVATED ALKALINE PHOSPHATASE LEVEL: ICD-10-CM

## 2021-02-05 PROCEDURE — 77080 DXA BONE DENSITY AXIAL: CPT

## 2021-07-22 ENCOUNTER — HOSPITAL ENCOUNTER (OUTPATIENT)
Dept: LAB | Facility: MEDICAL CENTER | Age: 56
End: 2021-07-22
Attending: INTERNAL MEDICINE
Payer: COMMERCIAL

## 2021-07-22 DIAGNOSIS — R74.8 ELEVATED ALKALINE PHOSPHATASE LEVEL: ICD-10-CM

## 2021-07-22 DIAGNOSIS — R73.01 FASTING HYPERGLYCEMIA: ICD-10-CM

## 2021-07-22 DIAGNOSIS — Z87.39 HISTORY OF RHABDOMYOLYSIS: ICD-10-CM

## 2021-07-22 DIAGNOSIS — E78.5 HYPERLIPIDEMIA, UNSPECIFIED HYPERLIPIDEMIA TYPE: ICD-10-CM

## 2021-07-22 PROCEDURE — 36415 COLL VENOUS BLD VENIPUNCTURE: CPT

## 2021-07-22 PROCEDURE — 83036 HEMOGLOBIN GLYCOSYLATED A1C: CPT

## 2021-07-22 PROCEDURE — 82306 VITAMIN D 25 HYDROXY: CPT

## 2021-07-22 PROCEDURE — 80061 LIPID PANEL: CPT

## 2021-07-22 PROCEDURE — 85025 COMPLETE CBC W/AUTO DIFF WBC: CPT

## 2021-07-22 PROCEDURE — 80053 COMPREHEN METABOLIC PANEL: CPT

## 2021-07-23 LAB
25(OH)D3 SERPL-MCNC: 38 NG/ML (ref 30–100)
ALBUMIN SERPL BCP-MCNC: 4.4 G/DL (ref 3.2–4.9)
ALBUMIN/GLOB SERPL: 1.8 G/DL
ALP SERPL-CCNC: 96 U/L (ref 30–99)
ALT SERPL-CCNC: 21 U/L (ref 2–50)
ANION GAP SERPL CALC-SCNC: 8 MMOL/L (ref 7–16)
AST SERPL-CCNC: 25 U/L (ref 12–45)
BASOPHILS # BLD AUTO: 0.5 % (ref 0–1.8)
BASOPHILS # BLD: 0.02 K/UL (ref 0–0.12)
BILIRUB SERPL-MCNC: 0.8 MG/DL (ref 0.1–1.5)
BUN SERPL-MCNC: 15 MG/DL (ref 8–22)
CALCIUM SERPL-MCNC: 9.4 MG/DL (ref 8.5–10.5)
CHLORIDE SERPL-SCNC: 106 MMOL/L (ref 96–112)
CHOLEST SERPL-MCNC: 201 MG/DL (ref 100–199)
CO2 SERPL-SCNC: 28 MMOL/L (ref 20–33)
CREAT SERPL-MCNC: 0.79 MG/DL (ref 0.5–1.4)
EOSINOPHIL # BLD AUTO: 0.15 K/UL (ref 0–0.51)
EOSINOPHIL NFR BLD: 3.7 % (ref 0–6.9)
ERYTHROCYTE [DISTWIDTH] IN BLOOD BY AUTOMATED COUNT: 43.2 FL (ref 35.9–50)
EST. AVERAGE GLUCOSE BLD GHB EST-MCNC: 120 MG/DL
FASTING STATUS PATIENT QL REPORTED: NORMAL
GLOBULIN SER CALC-MCNC: 2.5 G/DL (ref 1.9–3.5)
GLUCOSE SERPL-MCNC: 99 MG/DL (ref 65–99)
HBA1C MFR BLD: 5.8 % (ref 4–5.6)
HCT VFR BLD AUTO: 42.3 % (ref 37–47)
HDLC SERPL-MCNC: 79 MG/DL
HGB BLD-MCNC: 14.5 G/DL (ref 12–16)
IMM GRANULOCYTES # BLD AUTO: 0.01 K/UL (ref 0–0.11)
IMM GRANULOCYTES NFR BLD AUTO: 0.2 % (ref 0–0.9)
LDLC SERPL CALC-MCNC: 112 MG/DL
LYMPHOCYTES # BLD AUTO: 2.16 K/UL (ref 1–4.8)
LYMPHOCYTES NFR BLD: 52.9 % (ref 22–41)
MCH RBC QN AUTO: 33 PG (ref 27–33)
MCHC RBC AUTO-ENTMCNC: 34.3 G/DL (ref 33.6–35)
MCV RBC AUTO: 96.1 FL (ref 81.4–97.8)
MONOCYTES # BLD AUTO: 0.35 K/UL (ref 0–0.85)
MONOCYTES NFR BLD AUTO: 8.6 % (ref 0–13.4)
NEUTROPHILS # BLD AUTO: 1.39 K/UL (ref 2–7.15)
NEUTROPHILS NFR BLD: 34.1 % (ref 44–72)
NRBC # BLD AUTO: 0 K/UL
NRBC BLD-RTO: 0 /100 WBC
PLATELET # BLD AUTO: 211 K/UL (ref 164–446)
PMV BLD AUTO: 11.3 FL (ref 9–12.9)
POTASSIUM SERPL-SCNC: 4.2 MMOL/L (ref 3.6–5.5)
PROT SERPL-MCNC: 6.9 G/DL (ref 6–8.2)
RBC # BLD AUTO: 4.4 M/UL (ref 4.2–5.4)
SODIUM SERPL-SCNC: 142 MMOL/L (ref 135–145)
TRIGL SERPL-MCNC: 48 MG/DL (ref 0–149)
WBC # BLD AUTO: 4.1 K/UL (ref 4.8–10.8)

## 2021-08-03 ENCOUNTER — APPOINTMENT (RX ONLY)
Dept: URBAN - NONMETROPOLITAN AREA CLINIC 15 | Facility: CLINIC | Age: 56
Setting detail: DERMATOLOGY
End: 2021-08-03

## 2021-08-03 DIAGNOSIS — L81.1 CHLOASMA: ICD-10-CM

## 2021-08-03 PROCEDURE — ? ADDITIONAL NOTES

## 2021-08-03 PROCEDURE — ? COUNSELING

## 2021-08-03 PROCEDURE — 99212 OFFICE O/P EST SF 10 MIN: CPT

## 2021-08-03 PROCEDURE — ? MEDICATION COUNSELING

## 2021-08-03 ASSESSMENT — LOCATION DETAILED DESCRIPTION DERM: LOCATION DETAILED: LEFT CENTRAL MALAR CHEEK

## 2021-08-03 ASSESSMENT — LOCATION ZONE DERM: LOCATION ZONE: FACE

## 2021-08-03 ASSESSMENT — LOCATION SIMPLE DESCRIPTION DERM: LOCATION SIMPLE: LEFT CHEEK

## 2021-08-03 NOTE — PROCEDURE: ADDITIONAL NOTES
Additional Notes: Recommended to use Tretinoin cream every other night and hydroquinone cream once in the morning. Discussed that TV will talk with cosmetic department for recommended treatment and reminded patient to be consistent with sunscreen.
Detail Level: Detailed
Render Risk Assessment In Note?: no

## 2021-08-03 NOTE — PROCEDURE: MEDICATION COUNSELING
Hydroxychloroquine Pregnancy And Lactation Text: This medication has been shown to cause fetal harm but it isn't assigned a Pregnancy Risk Category. There are small amounts excreted in breast milk.
Terbinafine Counseling: Patient counseling regarding adverse effects of terbinafine including but not limited to headache, diarrhea, rash, upset stomach, liver function test abnormalities, itching, taste/smell disturbance, nausea, abdominal pain, and flatulence.  There is a rare possibility of liver failure that can occur when taking terbinafine.  The patient understands that a baseline LFT and kidney function test may be required. The patient verbalized understanding of the proper use and possible adverse effects of terbinafine.  All of the patient's questions and concerns were addressed.
Detail Level: Simple
Simponi Counseling:  I discussed with the patient the risks of golimumab including but not limited to myelosuppression, immunosuppression, autoimmune hepatitis, demyelinating diseases, lymphoma, and serious infections.  The patient understands that monitoring is required including a PPD at baseline and must alert us or the primary physician if symptoms of infection or other concerning signs are noted.
Erythromycin Pregnancy And Lactation Text: This medication is Pregnancy Category B and is considered safe during pregnancy. It is also excreted in breast milk.
Sski Pregnancy And Lactation Text: This medication is Pregnancy Category D and isn't considered safe during pregnancy. It is excreted in breast milk.
High Dose Vitamin A Pregnancy And Lactation Text: High dose vitamin A therapy is contraindicated during pregnancy and breast feeding.
Hydroquinone Pregnancy And Lactation Text: This medication has not been assigned a Pregnancy Risk Category but animal studies failed to show danger with the topical medication. It is unknown if the medication is excreted in breast milk.
Griseofulvin Pregnancy And Lactation Text: This medication is Pregnancy Category X and is known to cause serious birth defects. It is unknown if this medication is excreted in breast milk but breast feeding should be avoided.
Cimzia Counseling:  I discussed with the patient the risks of Cimzia including but not limited to immunosuppression, allergic reactions and infections.  The patient understands that monitoring is required including a PPD at baseline and must alert us or the primary physician if symptoms of infection or other concerning signs are noted.
Tazorac Pregnancy And Lactation Text: This medication is not safe during pregnancy. It is unknown if this medication is excreted in breast milk.
Clofazimine Pregnancy And Lactation Text: This medication is Pregnancy Category C and isn't considered safe during pregnancy. It is excreted in breast milk.
Siliq Pregnancy And Lactation Text: The risk during pregnancy and breastfeeding is uncertain with this medication.
Cellcept Counseling:  I discussed with the patient the risks of mycophenolate mofetil including but not limited to infection/immunosuppression, GI upset, hypokalemia, hypercholesterolemia, bone marrow suppression, lymphoproliferative disorders, malignancy, GI ulceration/bleed/perforation, colitis, interstitial lung disease, kidney failure, progressive multifocal leukoencephalopathy, and birth defects.  The patient understands that monitoring is required including a baseline creatinine and regular CBC testing. In addition, patient must alert us immediately if symptoms of infection or other concerning signs are noted.
Metronidazole Counseling:  I discussed with the patient the risks of metronidazole including but not limited to seizures, nausea/vomiting, a metallic taste in the mouth, nausea/vomiting and severe allergy.
Imiquimod Pregnancy And Lactation Text: This medication is Pregnancy Category C. It is unknown if this medication is excreted in breast milk.
Niacinamide Counseling: I recommended taking niacin or niacinamide, also know as vitamin B3, twice daily. Recent evidence suggests that taking vitamin B3 (500 mg twice daily) can reduce the risk of actinic keratoses and non-melanoma skin cancers. Side effects of vitamin B3 include flushing and headache.
Itraconazole Counseling:  I discussed with the patient the risks of itraconazole including but not limited to liver damage, nausea/vomiting, neuropathy, and severe allergy.  The patient understands that this medication is best absorbed when taken with acidic beverages such as non-diet cola or ginger ale.  The patient understands that monitoring is required including baseline LFTs and repeat LFTs at intervals.  The patient understands that they are to contact us or the primary physician if concerning signs are noted.
Cimzia Pregnancy And Lactation Text: This medication crosses the placenta but can be considered safe in certain situations. Cimzia may be excreted in breast milk.
Thalidomide Counseling: I discussed with the patient the risks of thalidomide including but not limited to birth defects, anxiety, weakness, chest pain, dizziness, cough and severe allergy.
Cellcept Pregnancy And Lactation Text: This medication is Pregnancy Category D and isn't considered safe during pregnancy. It is unknown if this medication is excreted in breast milk.
Topical Clindamycin Counseling: Patient counseled that this medication may cause skin irritation or allergic reactions.  In the event of skin irritation, the patient was advised to reduce the amount of the drug applied or use it less frequently.   The patient verbalized understanding of the proper use and possible adverse effects of clindamycin.  All of the patient's questions and concerns were addressed.
Colchicine Counseling:  Patient counseled regarding adverse effects including but not limited to stomach upset (nausea, vomiting, stomach pain, or diarrhea).  Patient instructed to limit alcohol consumption while taking this medication.  Colchicine may reduce blood counts especially with prolonged use.  The patient understands that monitoring of kidney function and blood counts may be required, especially at baseline. The patient verbalized understanding of the proper use and possible adverse effects of colchicine.  All of the patient's questions and concerns were addressed.
Imiquimod Counseling:  I discussed with the patient the risks of imiquimod including but not limited to erythema, scaling, itching, weeping, crusting, and pain.  Patient understands that the inflammatory response to imiquimod is variable from person to person and was educated regarded proper titration schedule.  If flu-like symptoms develop, patient knows to discontinue the medication and contact us.
Dapsone Counseling: I discussed with the patient the risks of dapsone including but not limited to hemolytic anemia, agranulocytosis, rashes, methemoglobinemia, kidney failure, peripheral neuropathy, headaches, GI upset, and liver toxicity.  Patients who start dapsone require monitoring including baseline LFTs and weekly CBCs for the first month, then every month thereafter.  The patient verbalized understanding of the proper use and possible adverse effects of dapsone.  All of the patient's questions and concerns were addressed.
Skyrizi Counseling: I discussed with the patient the risks of risankizumab-rzaa including but not limited to immunosuppression, and serious infections.  The patient understands that monitoring is required including a PPD at baseline and must alert us or the primary physician if symptoms of infection or other concerning signs are noted.
Topical Sulfur Applications Counseling: Topical Sulfur Counseling: Patient counseled that this medication may cause skin irritation or allergic reactions.  In the event of skin irritation, the patient was advised to reduce the amount of the drug applied or use it less frequently.   The patient verbalized understanding of the proper use and possible adverse effects of topical sulfur application.  All of the patient's questions and concerns were addressed.
Metronidazole Pregnancy And Lactation Text: This medication is Pregnancy Category B and considered safe during pregnancy.  It is also excreted in breast milk.
Niacinamide Pregnancy And Lactation Text: These medications are considered safe during pregnancy.
Itraconazole Pregnancy And Lactation Text: This medication is Pregnancy Category C and it isn't know if it is safe during pregnancy. It is also excreted in breast milk.
Cosentyx Counseling:  I discussed with the patient the risks of Cosentyx including but not limited to worsening of Crohn's disease, immunosuppression, allergic reactions and infections.  The patient understands that monitoring is required including a PPD at baseline and must alert us or the primary physician if symptoms of infection or other concerning signs are noted.
Terbinafine Pregnancy And Lactation Text: This medication is Pregnancy Category B and is considered safe during pregnancy. It is also excreted in breast milk and breast feeding isn't recommended.
Topical Clindamycin Pregnancy And Lactation Text: This medication is Pregnancy Category B and is considered safe during pregnancy. It is unknown if it is excreted in breast milk.
Thalidomide Pregnancy And Lactation Text: This medication is Pregnancy Category X and is absolutely contraindicated during pregnancy. It is unknown if it is excreted in breast milk.
Cyclophosphamide Counseling:  I discussed with the patient the risks of cyclophosphamide including but not limited to hair loss, hormonal abnormalities, decreased fertility, abdominal pain, diarrhea, nausea and vomiting, bone marrow suppression and infection. The patient understands that monitoring is required while taking this medication.
Ketoconazole Pregnancy And Lactation Text: This medication is Pregnancy Category C and it isn't know if it is safe during pregnancy. It is also excreted in breast milk and breast feeding isn't recommended.
Cyclosporine Counseling:  I discussed with the patient the risks of cyclosporine including but not limited to hypertension, gingival hyperplasia,myelosuppression, immunosuppression, liver damage, kidney damage, neurotoxicity, lymphoma, and serious infections. The patient understands that monitoring is required including baseline blood pressure, CBC, CMP, lipid panel and uric acid, and then 1-2 times monthly CMP and blood pressure.
Benzoyl Peroxide Pregnancy And Lactation Text: This medication is Pregnancy Category C. It is unknown if benzoyl peroxide is excreted in breast milk.
Cimetidine Counseling:  I discussed with the patient the risks of Cimetidine including but not limited to gynecomastia, headache, diarrhea, nausea, drowsiness, arrhythmias, pancreatitis, skin rashes, psychosis, bone marrow suppression and kidney toxicity.
Topical Sulfur Applications Pregnancy And Lactation Text: This medication is Pregnancy Category C and has an unknown safety profile during pregnancy. It is unknown if this topical medication is excreted in breast milk.
Dapsone Pregnancy And Lactation Text: This medication is Pregnancy Category C and is not considered safe during pregnancy or breast feeding.
Minocycline Counseling: Patient advised regarding possible photosensitivity and discoloration of the teeth, skin, lips, tongue and gums.  Patient instructed to avoid sunlight, if possible.  When exposed to sunlight, patients should wear protective clothing, sunglasses, and sunscreen.  The patient was instructed to call the office immediately if the following severe adverse effects occur:  hearing changes, easy bruising/bleeding, severe headache, or vision changes.  The patient verbalized understanding of the proper use and possible adverse effects of minocycline.  All of the patient's questions and concerns were addressed.
Nsaids Counseling: NSAID Counseling: I discussed with the patient that NSAIDs should be taken with food. Prolonged use of NSAIDs can result in the development of stomach ulcers.  Patient advised to stop taking NSAIDs if abdominal pain occurs.  The patient verbalized understanding of the proper use and possible adverse effects of NSAIDs.  All of the patient's questions and concerns were addressed.
Cosentyx Pregnancy And Lactation Text: This medication is Pregnancy Category B and is considered safe during pregnancy. It is unknown if this medication is excreted in breast milk.
Benzoyl Peroxide Counseling: Patient counseled that medicine may cause skin irritation and bleach clothing.  In the event of skin irritation, the patient was advised to reduce the amount of the drug applied or use it less frequently.   The patient verbalized understanding of the proper use and possible adverse effects of benzoyl peroxide.  All of the patient's questions and concerns were addressed.
Ketoconazole Counseling:   Patient counseled regarding improving absorption with orange juice.  Adverse effects include but are not limited to breast enlargement, headache, diarrhea, nausea, upset stomach, liver function test abnormalities, taste disturbance, and stomach pain.  There is a rare possibility of liver failure that can occur when taking ketoconazole. The patient understands that monitoring of LFTs may be required, especially at baseline. The patient verbalized understanding of the proper use and possible adverse effects of ketoconazole.  All of the patient's questions and concerns were addressed.
Cyclophosphamide Pregnancy And Lactation Text: This medication is Pregnancy Category D and it isn't considered safe during pregnancy. This medication is excreted in breast milk.
Minoxidil Counseling: Minoxidil is a topical medication which can increase blood flow where it is applied. It is uncertain how this medication increases hair growth. Side effects are uncommon and include stinging and allergic reactions.
Tranexamic Acid Counseling:  Patient advised of the small risk of bleeding problems with tranexamic acid. They were also instructed to call if they developed any nausea, vomiting or diarrhea. All of the patient's questions and concerns were addressed.
Include Pregnancy/Lactation Warning?: No
Mirvaso Counseling: Mirvaso is a topical medication which can decrease superficial blood flow where applied. Side effects are uncommon and include stinging, redness and allergic reactions.
Carac Counseling:  I discussed with the patient the risks of Carac including but not limited to erythema, scaling, itching, weeping, crusting, and pain.
Dupixent Pregnancy And Lactation Text: This medication likely crosses the placenta but the risk for the fetus is uncertain. This medication is excreted in breast milk.
Stelara Counseling:  I discussed with the patient the risks of ustekinumab including but not limited to immunosuppression, malignancy, posterior leukoencephalopathy syndrome, and serious infections.  The patient understands that monitoring is required including a PPD at baseline and must alert us or the primary physician if symptoms of infection or other concerning signs are noted.
Wartpeel Counseling:  I discussed with the patient the risks of Wartpeel including but not limited to erythema, scaling, itching, weeping, crusting, and pain.
Erivedge Counseling- I discussed with the patient the risks of Erivedge including but not limited to nausea, vomiting, diarrhea, constipation, weight loss, changes in the sense of taste, decreased appetite, muscle spasms, and hair loss.  The patient verbalized understanding of the proper use and possible adverse effects of Erivedge.  All of the patient's questions and concerns were addressed.
Minocycline Pregnancy And Lactation Text: This medication is Pregnancy Category D and not consider safe during pregnancy. It is also excreted in breast milk.
Tranexamic Acid Pregnancy And Lactation Text: It is unknown if this medication is safe during pregnancy or breast feeding.
Nsaids Pregnancy And Lactation Text: These medications are considered safe up to 30 weeks gestation. It is excreted in breast milk.
Dupixent Counseling: I discussed with the patient the risks of dupilumab including but not limited to eye infection and irritation, cold sores, injection site reactions, worsening of asthma, allergic reactions and increased risk of parasitic infection.  Live vaccines should be avoided while taking dupilumab. Dupilumab will also interact with certain medications such as warfarin and cyclosporine. The patient understands that monitoring is required and they must alert us or the primary physician if symptoms of infection or other concerning signs are noted.
Valtrex Pregnancy And Lactation Text: this medication is Pregnancy Category B and is considered safe during pregnancy. This medication is not directly found in breast milk but it's metabolite acyclovir is present.
Carac Pregnancy And Lactation Text: This medication is Pregnancy Category X and contraindicated in pregnancy and in women who may become pregnant. It is unknown if this medication is excreted in breast milk.
Methotrexate Counseling:  Patient counseled regarding adverse effects of methotrexate including but not limited to nausea, vomiting, abnormalities in liver function tests. Patients may develop mouth sores, rash, diarrhea, and abnormalities in blood counts. The patient understands that monitoring is required including LFT's and blood counts.  There is a rare possibility of scarring of the liver and lung problems that can occur when taking methotrexate. Persistent nausea, loss of appetite, pale stools, dark urine, cough, and shortness of breath should be reported immediately. Patient advised to discontinue methotrexate treatment at least three months before attempting to become pregnant.  I discussed the need for folate supplements while taking methotrexate.  These supplements can decrease side effects during methotrexate treatment. The patient verbalized understanding of the proper use and possible adverse effects of methotrexate.  All of the patient's questions and concerns were addressed.
Mirvaso Pregnancy And Lactation Text: This medication has not been assigned a Pregnancy Risk Category. It is unknown if the medication is excreted in breast milk.
Quinolones Counseling:  I discussed with the patient the risks of fluoroquinolones including but not limited to GI upset, allergic reaction, drug rash, diarrhea, dizziness, photosensitivity, yeast infections, liver function test abnormalities, tendonitis/tendon rupture.
Doxepin Counseling:  Patient advised that the medication is sedating and not to drive a car after taking this medication. Patient informed of potential adverse effects including but not limited to dry mouth, urinary retention, and blurry vision.  The patient verbalized understanding of the proper use and possible adverse effects of doxepin.  All of the patient's questions and concerns were addressed.
Cyclosporine Pregnancy And Lactation Text: This medication is Pregnancy Category C and it isn't know if it is safe during pregnancy. This medication is excreted in breast milk.
Odomzo Counseling- I discussed with the patient the risks of Odomzo including but not limited to nausea, vomiting, diarrhea, constipation, weight loss, changes in the sense of taste, decreased appetite, muscle spasms, and hair loss.  The patient verbalized understanding of the proper use and possible adverse effects of Odomzo.  All of the patient's questions and concerns were addressed.
Valtrex Counseling: I discussed with the patient the risks of valacyclovir including but not limited to kidney damage, nausea, vomiting and severe allergy.  The patient understands that if the infection seems to be worsening or is not improving, they are to call.
Calcipotriene Counseling:  I discussed with the patient the risks of calcipotriene including but not limited to erythema, scaling, itching, and irritation.
Doxepin Pregnancy And Lactation Text: This medication is Pregnancy Category C and it isn't known if it is safe during pregnancy. It is also excreted in breast milk and breast feeding isn't recommended.
Azithromycin Counseling:  I discussed with the patient the risks of azithromycin including but not limited to GI upset, allergic reaction, drug rash, diarrhea, and yeast infections.
Picato Counseling:  I discussed with the patient the risks of Picato including but not limited to erythema, scaling, itching, weeping, crusting, and pain.
Methotrexate Pregnancy And Lactation Text: This medication is Pregnancy Category X and is known to cause fetal harm. This medication is excreted in breast milk.
Taltz Counseling: I discussed with the patient the risks of ixekizumab including but not limited to immunosuppression, serious infections, worsening of inflammatory bowel disease and drug reactions.  The patient understands that monitoring is required including a PPD at baseline and must alert us or the primary physician if symptoms of infection or other concerning signs are noted.
Zyclara Counseling:  I discussed with the patient the risks of imiquimod including but not limited to erythema, scaling, itching, weeping, crusting, and pain.  Patient understands that the inflammatory response to imiquimod is variable from person to person and was educated regarded proper titration schedule.  If flu-like symptoms develop, patient knows to discontinue the medication and contact us.
Enbrel Counseling:  I discussed with the patient the risks of etanercept including but not limited to myelosuppression, immunosuppression, autoimmune hepatitis, demyelinating diseases, lymphoma, and infections.  The patient understands that monitoring is required including a PPD at baseline and must alert us or the primary physician if symptoms of infection or other concerning signs are noted.
Otezla Pregnancy And Lactation Text: This medication is Pregnancy Category C and it isn't known if it is safe during pregnancy. It is unknown if it is excreted in breast milk.
Humira Counseling:  I discussed with the patient the risks of adalimumab including but not limited to myelosuppression, immunosuppression, autoimmune hepatitis, demyelinating diseases, lymphoma, and serious infections.  The patient understands that monitoring is required including a PPD at baseline and must alert us or the primary physician if symptoms of infection or other concerning signs are noted.
Prednisone Counseling:  I discussed with the patient the risks of prolonged use of prednisone including but not limited to weight gain, insomnia, osteoporosis, mood changes, diabetes, susceptibility to infection, glaucoma and high blood pressure.  In cases where prednisone use is prolonged, patients should be monitored with blood pressure checks, serum glucose levels and an eye exam.  Additionally, the patient may need to be placed on GI prophylaxis, PCP prophylaxis, and calcium and vitamin D supplementation and/or a bisphosphonate.  The patient verbalized understanding of the proper use and the possible adverse effects of prednisone.  All of the patient's questions and concerns were addressed.
Azithromycin Pregnancy And Lactation Text: This medication is considered safe during pregnancy and is also secreted in breast milk.
Calcipotriene Pregnancy And Lactation Text: This medication has not been proven safe during pregnancy. It is unknown if this medication is excreted in breast milk.
Hydroxyzine Counseling: Patient advised that the medication is sedating and not to drive a car after taking this medication.  Patient informed of potential adverse effects including but not limited to dry mouth, urinary retention, and blurry vision.  The patient verbalized understanding of the proper use and possible adverse effects of hydroxyzine.  All of the patient's questions and concerns were addressed.
Rifampin Counseling: I discussed with the patient the risks of rifampin including but not limited to liver damage, kidney damage, red-orange body fluids, nausea/vomiting and severe allergy.
Otezla Counseling: The side effects of Otezla were discussed with the patient, including but not limited to worsening or new depression, weight loss, diarrhea, nausea, upper respiratory tract infection, and headache. Patient instructed to call the office should any adverse effect occur.  The patient verbalized understanding of the proper use and possible adverse effects of Otezla.  All the patient's questions and concerns were addressed.
Protopic Pregnancy And Lactation Text: This medication is Pregnancy Category C. It is unknown if this medication is excreted in breast milk when applied topically.
Oxybutynin Counseling:  I discussed with the patient the risks of oxybutynin including but not limited to skin rash, drowsiness, dry mouth, difficulty urinating, and blurred vision.
Bactrim Counseling:  I discussed with the patient the risks of sulfa antibiotics including but not limited to GI upset, allergic reaction, drug rash, diarrhea, dizziness, photosensitivity, and yeast infections.  Rarely, more serious reactions can occur including but not limited to aplastic anemia, agranulocytosis, methemoglobinemia, blood dyscrasias, liver or kidney failure, lung infiltrates or desquamative/blistering drug rashes.
Tremfya Counseling: I discussed with the patient the risks of guselkumab including but not limited to immunosuppression, serious infections, worsening of inflammatory bowel disease and drug reactions.  The patient understands that monitoring is required including a PPD at baseline and must alert us or the primary physician if symptoms of infection or other concerning signs are noted.
5-Fu Counseling: 5-Fluorouracil Counseling:  I discussed with the patient the risks of 5-fluorouracil including but not limited to erythema, scaling, itching, weeping, crusting, and pain.
Protopic Counseling: Patient may experience a mild burning sensation during topical application. Protopic is not approved in children less than 2 years of age. There have been case reports of hematologic and skin malignancies in patients using topical calcineurin inhibitors although causality is questionable.
Rifampin Pregnancy And Lactation Text: This medication is Pregnancy Category C and it isn't know if it is safe during pregnancy. It is also excreted in breast milk and should not be used if you are breast feeding.
Hydroxyzine Pregnancy And Lactation Text: This medication is not safe during pregnancy and should not be taken. It is also excreted in breast milk and breast feeding isn't recommended.
Xeljanz Counseling: I discussed with the patient the risks of Xeljanz therapy including increased risk of infection, liver issues, headache, diarrhea, or cold symptoms. Live vaccines should be avoided. They were instructed to call if they have any problems.
Opioid Pregnancy And Lactation Text: These medications can lead to premature delivery and should be avoided during pregnancy. These medications are also present in breast milk in small amounts.
Ilumya Counseling: I discussed with the patient the risks of tildrakizumab including but not limited to immunosuppression, malignancy, posterior leukoencephalopathy syndrome, and serious infections.  The patient understands that monitoring is required including a PPD at baseline and must alert us or the primary physician if symptoms of infection or other concerning signs are noted.
Bactrim Pregnancy And Lactation Text: This medication is Pregnancy Category D and is known to cause fetal risk.  It is also excreted in breast milk.
Sarecycline Counseling: Patient advised regarding possible photosensitivity and discoloration of the teeth, skin, lips, tongue and gums.  Patient instructed to avoid sunlight, if possible.  When exposed to sunlight, patients should wear protective clothing, sunglasses, and sunscreen.  The patient was instructed to call the office immediately if the following severe adverse effects occur:  hearing changes, easy bruising/bleeding, severe headache, or vision changes.  The patient verbalized understanding of the proper use and possible adverse effects of sarecycline.  All of the patient's questions and concerns were addressed.
Opioid Counseling: I discussed with the patient the potential side effects of opioids including but not limited to addiction, altered mental status, and depression. I stressed avoiding alcohol, benzodiazepines, muscle relaxants and sleep aids unless specifically okayed by a physician. The patient verbalized understanding of the proper use and possible adverse effects of opioids. All of the patient's questions and concerns were addressed. They were instructed to flush the remaining pills down the toilet if they did not need them for pain.
Xelrhondaz Pregnancy And Lactation Text: This medication is Pregnancy Category D and is not considered safe during pregnancy.  The risk during breast feeding is also uncertain.
Tetracycline Counseling: Patient counseled regarding possible photosensitivity and increased risk for sunburn.  Patient instructed to avoid sunlight, if possible.  When exposed to sunlight, patients should wear protective clothing, sunglasses, and sunscreen.  The patient was instructed to call the office immediately if the following severe adverse effects occur:  hearing changes, easy bruising/bleeding, severe headache, or vision changes.  The patient verbalized understanding of the proper use and possible adverse effects of tetracycline.  All of the patient's questions and concerns were addressed. Patient understands to avoid pregnancy while on therapy due to potential birth defects.
Drysol Pregnancy And Lactation Text: This medication is considered safe during pregnancy and breast feeding.
Finasteride Male Counseling: Finasteride Counseling:  I discussed with the patient the risks of use of finasteride including but not limited to decreased libido, decreased ejaculate volume, gynecomastia, and depression. Women should not handle medication.  All of the patient's questions and concerns were addressed.
Cephalexin Counseling: I counseled the patient regarding use of cephalexin as an antibiotic for prophylactic and/or therapeutic purposes. Cephalexin (commonly prescribed under brand name Keflex) is a cephalosporin antibiotic which is active against numerous classes of bacteria, including most skin bacteria. Side effects may include nausea, diarrhea, gastrointestinal upset, rash, hives, yeast infections, and in rare cases, hepatitis, kidney disease, seizures, fever, confusion, neurologic symptoms, and others. Patients with severe allergies to penicillin medications are cautioned that there is about a 10% incidence of cross-reactivity with cephalosporins. When possible, patients with penicillin allergies should use alternatives to cephalosporins for antibiotic therapy.
Propranolol Counseling:  I discussed with the patient the risks of propranolol including but not limited to low heart rate, low blood pressure, low blood sugar, restlessness and increased cold sensitivity. They should call the office if they experience any of these side effects.
Albendazole Counseling:  I discussed with the patient the risks of albendazole including but not limited to cytopenia, kidney damage, nausea/vomiting and severe allergy.  The patient understands that this medication is being used in an off-label manner.
Acitretin Counseling:  I discussed with the patient the risks of acitretin including but not limited to hair loss, dry lips/skin/eyes, liver damage, hyperlipidemia, depression/suicidal ideation, photosensitivity.  Serious rare side effects can include but are not limited to pancreatitis, pseudotumor cerebri, bony changes, clot formation/stroke/heart attack.  Patient understands that alcohol is contraindicated since it can result in liver toxicity and significantly prolong the elimination of the drug by many years.
Rhofade Counseling: Rhofade is a topical medication which can decrease superficial blood flow where applied. Side effects are uncommon and include stinging, redness and allergic reactions.
Drysol Counseling:  I discussed with the patient the risks of drysol/aluminum chloride including but not limited to skin rash, itching, irritation, burning.
Solaraze Counseling:  I discussed with the patient the risks of Solaraze including but not limited to erythema, scaling, itching, weeping, crusting, and pain.
Elidel Counseling: Patient may experience a mild burning sensation during topical application. Elidel is not approved in children less than 2 years of age. There have been case reports of hematologic and skin malignancies in patients using topical calcineurin inhibitors although causality is questionable.
Ivermectin Counseling:  Patient instructed to take medication on an empty stomach with a full glass of water.  Patient informed of potential adverse effects including but not limited to nausea, diarrhea, dizziness, itching, and swelling of the extremities or lymph nodes.  The patient verbalized understanding of the proper use and possible adverse effects of ivermectin.  All of the patient's questions and concerns were addressed.
Infliximab Counseling:  I discussed with the patient the risks of infliximab including but not limited to myelosuppression, immunosuppression, autoimmune hepatitis, demyelinating diseases, lymphoma, and serious infections.  The patient understands that monitoring is required including a PPD at baseline and must alert us or the primary physician if symptoms of infection or other concerning signs are noted.
Arava Counseling:  Patient counseled regarding adverse effects of Arava including but not limited to nausea, vomiting, abnormalities in liver function tests. Patients may develop mouth sores, rash, diarrhea, and abnormalities in blood counts. The patient understands that monitoring is required including LFTs and blood counts.  There is a rare possibility of scarring of the liver and lung problems that can occur when taking methotrexate. Persistent nausea, loss of appetite, pale stools, dark urine, cough, and shortness of breath should be reported immediately. Patient advised to discontinue Arava treatment and consult with a physician prior to attempting conception. The patient will have to undergo a treatment to eliminate Arava from the body prior to conception.
Finasteride Pregnancy And Lactation Text: This medication is absolutely contraindicated during pregnancy. It is unknown if it is excreted in breast milk.
Acitretin Pregnancy And Lactation Text: This medication is Pregnancy Category X and should not be given to women who are pregnant or may become pregnant in the future. This medication is excreted in breast milk.
Cephalexin Pregnancy And Lactation Text: This medication is Pregnancy Category B and considered safe during pregnancy.  It is also excreted in breast milk but can be used safely for shorter doses.
Albendazole Pregnancy And Lactation Text: This medication is Pregnancy Category C and it isn't known if it is safe during pregnancy. It is also excreted in breast milk.
Propranolol Pregnancy And Lactation Text: This medication is Pregnancy Category C and it isn't known if it is safe during pregnancy. It is excreted in breast milk.
Birth Control Pills Pregnancy And Lactation Text: This medication should be avoided if pregnant and for the first 30 days post-partum.
Xolair Pregnancy And Lactation Text: This medication is Pregnancy Category B and is considered safe during pregnancy. This medication is excreted in breast milk.
Bexarotene Pregnancy And Lactation Text: This medication is Pregnancy Category X and should not be given to women who are pregnant or may become pregnant. This medication should not be used if you are breast feeding.
Solaraze Pregnancy And Lactation Text: This medication is Pregnancy Category B and is considered safe. There is some data to suggest avoiding during the third trimester. It is unknown if this medication is excreted in breast milk.
Gabapentin Counseling: I discussed with the patient the risks of gabapentin including but not limited to dizziness, somnolence, fatigue and ataxia.
Xolair Counseling:  Patient informed of potential adverse effects including but not limited to fever, muscle aches, rash and allergic reactions.  The patient verbalized understanding of the proper use and possible adverse effects of Xolair.  All of the patient's questions and concerns were addressed.
Bexarotene Counseling:  I discussed with the patient the risks of bexarotene including but not limited to hair loss, dry lips/skin/eyes, liver abnormalities, hyperlipidemia, pancreatitis, depression/suicidal ideation, photosensitivity, drug rash/allergic reactions, hypothyroidism, anemia, leukopenia, infection, cataracts, and teratogenicity.  Patient understands that they will need regular blood tests to check lipid profile, liver function tests, white blood cell count, thyroid function tests and pregnancy test if applicable.
Clindamycin Counseling: I counseled the patient regarding use of clindamycin as an antibiotic for prophylactic and/or therapeutic purposes. Clindamycin is active against numerous classes of bacteria, including skin bacteria. Side effects may include nausea, diarrhea, gastrointestinal upset, rash, hives, yeast infections, and in rare cases, colitis.
Birth Control Pills Counseling: Birth Control Pill Counseling: I discussed with the patient the potential side effects of OCPs including but not limited to increased risk of stroke, heart attack, thrombophlebitis, deep venous thrombosis, hepatic adenomas, breast changes, GI upset, headaches, and depression.  The patient verbalized understanding of the proper use and possible adverse effects of OCPs. All of the patient's questions and concerns were addressed.
Eucrisa Counseling: Patient may experience a mild burning sensation during topical application. Eucrisa is not approved in children less than 2 years of age.
Isotretinoin Counseling: Patient should get monthly blood tests, not donate blood, not drive at night if vision affected, not share medication, and not undergo elective surgery for 6 months after tx completed. Side effects reviewed, pt to contact office should one occur.
Fluconazole Counseling:  Patient counseled regarding adverse effects of fluconazole including but not limited to headache, diarrhea, nausea, upset stomach, liver function test abnormalities, taste disturbance, and stomach pain.  There is a rare possibility of liver failure that can occur when taking fluconazole.  The patient understands that monitoring of LFTs and kidney function test may be required, especially at baseline. The patient verbalized understanding of the proper use and possible adverse effects of fluconazole.  All of the patient's questions and concerns were addressed.
Topical Retinoid counseling:  Patient advised to apply a pea-sized amount only at bedtime and wait 30 minutes after washing their face before applying.  If too drying, patient may add a non-comedogenic moisturizer. The patient verbalized understanding of the proper use and possible adverse effects of retinoids.  All of the patient's questions and concerns were addressed.
Clindamycin Pregnancy And Lactation Text: This medication can be used in pregnancy if certain situations. Clindamycin is also present in breast milk.
Rituxan Counseling:  I discussed with the patient the risks of Rituxan infusions. Side effects can include infusion reactions, severe drug rashes including mucocutaneous reactions, reactivation of latent hepatitis and other infections and rarely progressive multifocal leukoencephalopathy.  All of the patient's questions and concerns were addressed.
Doxycycline Pregnancy And Lactation Text: This medication is Pregnancy Category D and not consider safe during pregnancy. It is also excreted in breast milk but is considered safe for shorter treatment courses.
Spironolactone Pregnancy And Lactation Text: This medication can cause feminization of the male fetus and should be avoided during pregnancy. The active metabolite is also found in breast milk.
Glycopyrrolate Pregnancy And Lactation Text: This medication is Pregnancy Category B and is considered safe during pregnancy. It is unknown if it is excreted breast milk.
Isotretinoin Pregnancy And Lactation Text: This medication is Pregnancy Category X and is considered extremely dangerous during pregnancy. It is unknown if it is excreted in breast milk.
Azathioprine Counseling:  I discussed with the patient the risks of azathioprine including but not limited to myelosuppression, immunosuppression, hepatotoxicity, lymphoma, and infections.  The patient understands that monitoring is required including baseline LFTs, Creatinine, possible TPMP genotyping and weekly CBCs for the first month and then every 2 weeks thereafter.  The patient verbalized understanding of the proper use and possible adverse effects of azathioprine.  All of the patient's questions and concerns were addressed.
Rituxan Pregnancy And Lactation Text: This medication is Pregnancy Category C and it isn't know if it is safe during pregnancy. It is unknown if this medication is excreted in breast milk but similar antibodies are known to be excreted.
Doxycycline Counseling:  Patient counseled regarding possible photosensitivity and increased risk for sunburn.  Patient instructed to avoid sunlight, if possible.  When exposed to sunlight, patients should wear protective clothing, sunglasses, and sunscreen.  The patient was instructed to call the office immediately if the following severe adverse effects occur:  hearing changes, easy bruising/bleeding, severe headache, or vision changes.  The patient verbalized understanding of the proper use and possible adverse effects of doxycycline.  All of the patient's questions and concerns were addressed.
Glycopyrrolate Counseling:  I discussed with the patient the risks of glycopyrrolate including but not limited to skin rash, drowsiness, dry mouth, difficulty urinating, and blurred vision.
Spironolactone Counseling: Patient advised regarding risks of diarrhea, abdominal pain, hyperkalemia, birth defects (for female patients), liver toxicity and renal toxicity. The patient may need blood work to monitor liver and kidney function and potassium levels while on therapy. The patient verbalized understanding of the proper use and possible adverse effects of spironolactone.  All of the patient's questions and concerns were addressed.
High Dose Vitamin A Counseling: Side effects reviewed, pt to contact office should one occur.
SSKI Counseling:  I discussed with the patient the risks of SSKI including but not limited to thyroid abnormalities, metallic taste, GI upset, fever, headache, acne, arthralgias, paraesthesias, lymphadenopathy, easy bleeding, arrhythmias, and allergic reaction.
Erythromycin Counseling:  I discussed with the patient the risks of erythromycin including but not limited to GI upset, allergic reaction, drug rash, diarrhea, increase in liver enzymes, and yeast infections.
Hydroxychloroquine Counseling:  I discussed with the patient that a baseline ophthalmologic exam is needed at the start of therapy and every year thereafter while on therapy. A CBC may also be warranted for monitoring.  The side effects of this medication were discussed with the patient, including but not limited to agranulocytosis, aplastic anemia, seizures, rashes, retinopathy, and liver toxicity. Patient instructed to call the office should any adverse effect occur.  The patient verbalized understanding of the proper use and possible adverse effects of Plaquenil.  All the patient's questions and concerns were addressed.
Clofazimine Counseling:  I discussed with the patient the risks of clofazimine including but not limited to skin and eye pigmentation, liver damage, nausea/vomiting, gastrointestinal bleeding and allergy.
Griseofulvin Counseling:  I discussed with the patient the risks of griseofulvin including but not limited to photosensitivity, cytopenia, liver damage, nausea/vomiting and severe allergy.  The patient understands that this medication is best absorbed when taken with a fatty meal (e.g., ice cream or french fries).
Siliq Counseling:  I discussed with the patient the risks of Siliq including but not limited to new or worsening depression, suicidal thoughts and behavior, immunosuppression, malignancy, posterior leukoencephalopathy syndrome, and serious infections.  The patient understands that monitoring is required including a PPD at baseline and must alert us or the primary physician if symptoms of infection or other concerning signs are noted. There is also a special program designed to monitor depression which is required with Siliq.
Hydroquinone Counseling:  Patient advised that medication may result in skin irritation, lightening (hypopigmentation), dryness, and burning.  In the event of skin irritation, the patient was advised to reduce the amount of the drug applied or use it less frequently.  Rarely, spots that are treated with hydroquinone can become darker (pseudoochronosis).  Should this occur, patient instructed to stop medication and call the office. The patient verbalized understanding of the proper use and possible adverse effects of hydroquinone.  All of the patient's questions and concerns were addressed.
Tazorac Counseling:  Patient advised that medication is irritating and drying.  Patient may need to apply sparingly and wash off after an hour before eventually leaving it on overnight.  The patient verbalized understanding of the proper use and possible adverse effects of tazorac.  All of the patient's questions and concerns were addressed.
Libtayo Counseling- I discussed with the patient the risks of Libtayo including but not limited to nausea, vomiting, diarrhea, and bone or muscle pain.  The patient verbalized understanding of the proper use and possible adverse effects of Libtayo.  All of the patient's questions and concerns were addressed.
Libtayo Pregnancy And Lactation Text: This medication is contraindicated in pregnancy and when breast feeding.

## 2021-09-01 ENCOUNTER — APPOINTMENT (RX ONLY)
Dept: URBAN - METROPOLITAN AREA CLINIC 20 | Facility: CLINIC | Age: 56
Setting detail: DERMATOLOGY
End: 2021-09-01

## 2021-09-01 NOTE — HPI: COSMETIC CONSULTATION
When Outside In The Sun, Do You...: mostly tans, rarely burns
Additional History: \\nConcern- melasma currently seeing Vida casillas using HQ & trentinoin.  says he sees improvement in dark spots, photos show good improvement as well. \\n\\nPlan- information given on clear & brilliant. Will call if they decide to do treatment. \\n\\nLive in [place]

## 2021-09-14 ENCOUNTER — OFFICE VISIT (OUTPATIENT)
Dept: MEDICAL GROUP | Facility: PHYSICIAN GROUP | Age: 56
End: 2021-09-14
Payer: COMMERCIAL

## 2021-09-14 VITALS
WEIGHT: 120 LBS | TEMPERATURE: 98.2 F | HEIGHT: 58 IN | DIASTOLIC BLOOD PRESSURE: 82 MMHG | OXYGEN SATURATION: 97 % | HEART RATE: 74 BPM | RESPIRATION RATE: 12 BRPM | SYSTOLIC BLOOD PRESSURE: 138 MMHG | BODY MASS INDEX: 25.19 KG/M2

## 2021-09-14 DIAGNOSIS — R74.8 ALKALINE PHOSPHATASE ELEVATION: ICD-10-CM

## 2021-09-14 DIAGNOSIS — Z86.010 PERSONAL HISTORY OF COLONIC POLYPS: ICD-10-CM

## 2021-09-14 DIAGNOSIS — R73.01 FASTING HYPERGLYCEMIA: ICD-10-CM

## 2021-09-14 DIAGNOSIS — R42 VERTIGO: ICD-10-CM

## 2021-09-14 DIAGNOSIS — M54.2 NECK PAIN ON RIGHT SIDE: ICD-10-CM

## 2021-09-14 PROCEDURE — 99214 OFFICE O/P EST MOD 30 MIN: CPT | Performed by: INTERNAL MEDICINE

## 2021-09-14 ASSESSMENT — FIBROSIS 4 INDEX: FIB4 SCORE: 1.45

## 2021-09-14 NOTE — ASSESSMENT & PLAN NOTE
Patient complaining that she has occasional wobbly sensation when she moves, improves with zyrtec.  She is taking the citrazine.

## 2021-09-14 NOTE — ASSESSMENT & PLAN NOTE
Patient complaining of right neck aching x about 1 week.  She cannot recall any trauma, she is working out, she does use free weights.  Worse with rotation and flexion.  She has used some massage, she is not using the aleve for this.

## 2021-09-14 NOTE — PROGRESS NOTES
Chief Complaint   Patient presents with   • Lab Results   • Neck Pain     x1wk       HISTORY OF PRESENT ILLNESS: Patient is a 56 y.o. female established patient who presents today to discuss the medical issues below.    Personal history of colonic polyps  Current with colonoscopy 12/2019, one polyp, 10 year follow up.      Neck pain on right side  Patient complaining of right neck aching x about 1 week.  She cannot recall any trauma, she is working out, she does use free weights.  Worse with rotation and flexion.  She has used some massage, she is not using the aleve for this.      Vertigo  Patient complaining that she has occasional wobbly sensation when she moves, improves with zyrtec.  She is taking the citrazine.      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    Current Outpatient Medications   Medication Sig Dispense Refill   • Loratadine (CLARITIN PO) Take  by mouth.     • vitamin D (CHOLECALCIFEROL) 1000 UNIT Tab Take 1,000 Units by mouth every day.     • Multiple Vitamins-Calcium (ONE-A-DAY WOMENS PO) Take  by mouth.     • DiphenhydrAMINE HCl (BENADRYL ALLERGY PO) Take  by mouth.     • CALCIUM 500 500 MG TABS Take 1 Tab by mouth 2 times a day, with meals.       No current facility-administered medications for this visit.         Past Medical  "History:   Diagnosis Date   • Allergic rhinitis, cause unspecified    • Atrophic vaginitis 3/9/2017   • History of rhabdomyolysis 6/4/2016   • Irregular menses 10/8/2009   • Liver mass 6/29/2016   • Menopause 3/9/2017   • Oral thrush 11/3/2015   • Pap smear 8/18/08   • Rosacea     sees dermatologist in [place]   • Unspecified hemorrhoids without mention of complication    • Vaginitis 4/15/2015   • Varicose vein        Social History     Tobacco Use   • Smoking status: Never Smoker   • Smokeless tobacco: Never Used   Substance Use Topics   • Alcohol use: No     Alcohol/week: 0.0 oz   • Drug use: No       Family Status   Relation Name Status   • Mo  Alive        had a nervous breakdown otherwise healthy   • Fa  Alive        healthy   No family history on file.    ROS:    Respiratory: Negative for cough, sputum production, shortness of breath or wheezing.    Cardiovascular: Negative for chest pain, palpitations, orthopnea, dyspnea with exertion or edema.   Gastrointestinal: Negative for GI upset, nausea, vomiting, abdominal pain, constipation or diarrhea.   Genitourinary: Negative for dysuria, urgency, hesitancy or frequency.       Exam:    /82 (BP Location: Left arm, Patient Position: Sitting, BP Cuff Size: Adult)   Pulse 74   Temp 36.8 °C (98.2 °F) (Temporal)   Resp 12   Ht 1.473 m (4' 10\")   Wt 54.4 kg (120 lb)   SpO2 97%  Body mass index is 25.08 kg/m².  General:  Well nourished, well developed female in NAD.  HENT: Normocephalic, bilateral TMs are intact, nasal and oral mucosa with no lesions,   Neck: some diffuse tenderness right muscle groups, no palpable masses, no vertebral body tenderness, mild decrease rom,   Pulmonary: Clear to ausculation and percussion.  Normal effort. No rales, rhonchi, or wheezing.  Cardiovascular: Regular rate and rhythm without murmur.   Abdomen: Normal bowel sounds soft and nontender no palpable liver spleen bladder mass.  Extremities: No LE edema noted.  Neuro: Grossly " nonfocal. Neg nystagmus  Psych: Alert and oriented to person, place, and time. Appropriate mood and conversation.    LABS: Results reviewed and discussed with the patient, questions answered.    This dictation was created using voice recognition software. I have made reasonable attempts to correct errors, however, errors of grammar and content may exist.          Assessment/Plan:    1. Personal history of colonic polyps  Current with colonoscopy, due in 10 years.    2. Neck pain on right side  Most c/w strain, discussed limit weight lifting a bit, aleve, heat, stretching    3. Vertigo  Most c/w allergic rhinitis, discussed maximizing zyrtec, monitor.     4. Alkaline phosphatase elevation  Currently with normal alk phos, continue monitoring.    - CBC WITH DIFFERENTIAL; Future  - Lipid Profile; Future    5. Fasting hyperglycemia  Discussed, patient enjoys carbohydrates, discussed increase protein, decrease carbohydrates, recheck 6 mos.  Brother with DMII, no other family history of DM.    - Comp Metabolic Panel; Future  - HEMOGLOBIN A1C; Future       Patient was seen for 25 minutes face to face of which more than 50% of the time was spent in counseling and coordination of care regarding the above problems.

## 2022-02-17 ENCOUNTER — HOSPITAL ENCOUNTER (OUTPATIENT)
Dept: LAB | Facility: MEDICAL CENTER | Age: 57
End: 2022-02-17
Attending: INTERNAL MEDICINE
Payer: COMMERCIAL

## 2022-02-17 DIAGNOSIS — R73.01 FASTING HYPERGLYCEMIA: ICD-10-CM

## 2022-02-17 DIAGNOSIS — R74.8 ALKALINE PHOSPHATASE ELEVATION: ICD-10-CM

## 2022-02-17 LAB
ALBUMIN SERPL BCP-MCNC: 4.8 G/DL (ref 3.2–4.9)
ALBUMIN/GLOB SERPL: 1.9 G/DL
ALP SERPL-CCNC: 107 U/L (ref 30–99)
ALT SERPL-CCNC: 24 U/L (ref 2–50)
ANION GAP SERPL CALC-SCNC: 12 MMOL/L (ref 7–16)
AST SERPL-CCNC: 25 U/L (ref 12–45)
BASOPHILS # BLD AUTO: 0.6 % (ref 0–1.8)
BASOPHILS # BLD: 0.03 K/UL (ref 0–0.12)
BILIRUB SERPL-MCNC: 0.6 MG/DL (ref 0.1–1.5)
BUN SERPL-MCNC: 15 MG/DL (ref 8–22)
CALCIUM SERPL-MCNC: 9.6 MG/DL (ref 8.5–10.5)
CHLORIDE SERPL-SCNC: 106 MMOL/L (ref 96–112)
CHOLEST SERPL-MCNC: 236 MG/DL (ref 100–199)
CO2 SERPL-SCNC: 25 MMOL/L (ref 20–33)
CREAT SERPL-MCNC: 0.8 MG/DL (ref 0.5–1.4)
EOSINOPHIL # BLD AUTO: 0.08 K/UL (ref 0–0.51)
EOSINOPHIL NFR BLD: 1.5 % (ref 0–6.9)
ERYTHROCYTE [DISTWIDTH] IN BLOOD BY AUTOMATED COUNT: 41.2 FL (ref 35.9–50)
EST. AVERAGE GLUCOSE BLD GHB EST-MCNC: 114 MG/DL
FASTING STATUS PATIENT QL REPORTED: NORMAL
GLOBULIN SER CALC-MCNC: 2.5 G/DL (ref 1.9–3.5)
GLUCOSE SERPL-MCNC: 101 MG/DL (ref 65–99)
HBA1C MFR BLD: 5.6 % (ref 4–5.6)
HCT VFR BLD AUTO: 40.9 % (ref 37–47)
HDLC SERPL-MCNC: 83 MG/DL
HGB BLD-MCNC: 14.1 G/DL (ref 12–16)
IMM GRANULOCYTES # BLD AUTO: 0.01 K/UL (ref 0–0.11)
IMM GRANULOCYTES NFR BLD AUTO: 0.2 % (ref 0–0.9)
LDLC SERPL CALC-MCNC: 144 MG/DL
LYMPHOCYTES # BLD AUTO: 1.95 K/UL (ref 1–4.8)
LYMPHOCYTES NFR BLD: 37.5 % (ref 22–41)
MCH RBC QN AUTO: 32.2 PG (ref 27–33)
MCHC RBC AUTO-ENTMCNC: 34.5 G/DL (ref 33.6–35)
MCV RBC AUTO: 93.4 FL (ref 81.4–97.8)
MONOCYTES # BLD AUTO: 0.43 K/UL (ref 0–0.85)
MONOCYTES NFR BLD AUTO: 8.3 % (ref 0–13.4)
NEUTROPHILS # BLD AUTO: 2.7 K/UL (ref 2–7.15)
NEUTROPHILS NFR BLD: 51.9 % (ref 44–72)
NRBC # BLD AUTO: 0 K/UL
NRBC BLD-RTO: 0 /100 WBC
PLATELET # BLD AUTO: 237 K/UL (ref 164–446)
PMV BLD AUTO: 10.8 FL (ref 9–12.9)
POTASSIUM SERPL-SCNC: 4 MMOL/L (ref 3.6–5.5)
PROT SERPL-MCNC: 7.3 G/DL (ref 6–8.2)
RBC # BLD AUTO: 4.38 M/UL (ref 4.2–5.4)
SODIUM SERPL-SCNC: 143 MMOL/L (ref 135–145)
TRIGL SERPL-MCNC: 43 MG/DL (ref 0–149)
WBC # BLD AUTO: 5.2 K/UL (ref 4.8–10.8)

## 2022-02-17 PROCEDURE — 36415 COLL VENOUS BLD VENIPUNCTURE: CPT

## 2022-02-17 PROCEDURE — 80061 LIPID PANEL: CPT

## 2022-02-17 PROCEDURE — 80053 COMPREHEN METABOLIC PANEL: CPT

## 2022-02-17 PROCEDURE — 85025 COMPLETE CBC W/AUTO DIFF WBC: CPT

## 2022-02-17 PROCEDURE — 83036 HEMOGLOBIN GLYCOSYLATED A1C: CPT

## 2022-04-14 ENCOUNTER — HOSPITAL ENCOUNTER (OUTPATIENT)
Facility: MEDICAL CENTER | Age: 57
End: 2022-04-14
Attending: NURSE PRACTITIONER
Payer: COMMERCIAL

## 2022-04-14 ENCOUNTER — OFFICE VISIT (OUTPATIENT)
Dept: MEDICAL GROUP | Facility: PHYSICIAN GROUP | Age: 57
End: 2022-04-14
Payer: COMMERCIAL

## 2022-04-14 VITALS
TEMPERATURE: 98.4 F | BODY MASS INDEX: 24.94 KG/M2 | SYSTOLIC BLOOD PRESSURE: 110 MMHG | DIASTOLIC BLOOD PRESSURE: 70 MMHG | HEIGHT: 58 IN | HEART RATE: 74 BPM | OXYGEN SATURATION: 97 % | WEIGHT: 118.8 LBS | RESPIRATION RATE: 14 BRPM

## 2022-04-14 DIAGNOSIS — N89.8 VAGINAL ITCHING: ICD-10-CM

## 2022-04-14 DIAGNOSIS — E78.5 DYSLIPIDEMIA: ICD-10-CM

## 2022-04-14 LAB
APPEARANCE UR: CLEAR
BILIRUB UR STRIP-MCNC: NORMAL MG/DL
COLOR UR AUTO: YELLOW
GLUCOSE UR STRIP.AUTO-MCNC: NORMAL MG/DL
KETONES UR STRIP.AUTO-MCNC: NORMAL MG/DL
LEUKOCYTE ESTERASE UR QL STRIP.AUTO: NORMAL
NITRITE UR QL STRIP.AUTO: NORMAL
PH UR STRIP.AUTO: 7 [PH] (ref 5–8)
PROT UR QL STRIP: NORMAL MG/DL
RBC UR QL AUTO: NORMAL
SP GR UR STRIP.AUTO: 1.01
UROBILINOGEN UR STRIP-MCNC: 0.2 MG/DL

## 2022-04-14 PROCEDURE — 87510 GARDNER VAG DNA DIR PROBE: CPT

## 2022-04-14 PROCEDURE — 87660 TRICHOMONAS VAGIN DIR PROBE: CPT

## 2022-04-14 PROCEDURE — 87480 CANDIDA DNA DIR PROBE: CPT

## 2022-04-14 PROCEDURE — 81002 URINALYSIS NONAUTO W/O SCOPE: CPT | Performed by: NURSE PRACTITIONER

## 2022-04-14 PROCEDURE — 99213 OFFICE O/P EST LOW 20 MIN: CPT | Performed by: NURSE PRACTITIONER

## 2022-04-14 RX ORDER — DOXYCYCLINE HYCLATE 100 MG
TABLET ORAL
COMMUNITY
Start: 2022-04-11 | End: 2022-04-21

## 2022-04-14 ASSESSMENT — PATIENT HEALTH QUESTIONNAIRE - PHQ9: CLINICAL INTERPRETATION OF PHQ2 SCORE: 0

## 2022-04-14 ASSESSMENT — FIBROSIS 4 INDEX: FIB4 SCORE: 1.21

## 2022-04-14 NOTE — ASSESSMENT & PLAN NOTE
Chronic health problem, well-controlled with lifestyle measures.  Exercises on equipment at home or does CrossFit. The 10-year ASCVD risk score (Bucklinsharon NAM Jr., et al., 2013) is: 1.3%

## 2022-04-14 NOTE — ASSESSMENT & PLAN NOTE
Patient is 56-year-old female here to establish care with me today.  Reports ongoing vaginal itching for at least 6 weeks.  Tried over-the-counter 7-day treatment without improvement.  Then went to urgent care and was prescribed Diflucan, which did not help.  Vaginal pathogens or urinalysis were not done at that visit.  Denies dysuria, hematuria, vaginal discharge.  Not sexually active.

## 2022-04-14 NOTE — PATIENT INSTRUCTIONS

## 2022-04-14 NOTE — PROGRESS NOTES
CC: Establish care, lab review, vaginal itching    HISTORY OF THE PRESENT ILLNESS: Patient is a 56 y.o. female. This pleasant patient is here today,, my , to establish care and for evaluation and management of the following health problems.    Health Maintenance: Completed      Vaginal itching  Patient is 56-year-old female here to establish care with me today.  Reports ongoing vaginal itching for at least 6 weeks.  Tried over-the-counter 7-day treatment without improvement.  Then went to urgent care and was prescribed Diflucan, which did not help.  Vaginal pathogens or urinalysis were not done at that visit.  Denies dysuria, hematuria, vaginal discharge.  Not sexually active.    Dyslipidemia  Chronic health problem, well-controlled with lifestyle measures.  Exercises on equipment at home or does CrossFit. The 10-year ASCVD risk score (Javy CYRIL Jr., et al., 2013) is: 1.3%        Allergies: Patient has no known allergies.    Current Outpatient Medications Ordered in Epic   Medication Sig Dispense Refill   • Loratadine (CLARITIN PO) Take  by mouth.     • vitamin D (CHOLECALCIFEROL) 1000 UNIT Tab Take 1,000 Units by mouth every day.     • Multiple Vitamins-Calcium (ONE-A-DAY WOMENS PO) Take  by mouth.     • CALCIUM 500 500 MG TABS Take 1 Tab by mouth 2 times a day, with meals.     • DiphenhydrAMINE HCl (BENADRYL ALLERGY PO) Take  by mouth. (Patient not taking: Reported on 4/14/2022)       No current [facility]-ordered facility-administered medications on file.       Past Medical History:   Diagnosis Date   • Allergic rhinitis, cause unspecified    • Atrophic vaginitis 3/9/2017   • History of rhabdomyolysis 6/4/2016   • Irregular menses 10/8/2009   • Liver mass 6/29/2016   • Menopause 3/9/2017   • Oral thrush 11/3/2015   • Pap smear 8/18/08   • Rosacea     sees dermatologist in [place]   • Unspecified hemorrhoids without mention of complication    • Vaginitis 4/15/2015   • Varicose vein        No past surgical history on  "file.    Social History     Tobacco Use   • Smoking status: Never Smoker   • Smokeless tobacco: Never Used   Substance Use Topics   • Alcohol use: No     Alcohol/week: 0.0 oz   • Drug use: No       Family History   Problem Relation Age of Onset   • Diabetes Brother        ROS:   As in HPI, otherwise negative for chest pain, dyspnea, abdominal pain, dysuria, blood in stool, fever         Exam: /70   Pulse 74   Temp 36.9 °C (98.4 °F) (Temporal)   Resp 14   Ht 1.473 m (4' 10\")   Wt 53.9 kg (118 lb 12.8 oz)   SpO2 97%  Body mass index is 24.83 kg/m².    General: Alert, pleasant, well nourished, well developed female in NAD  HEENT: Normocephalic. Eyes conjunctiva clear lids without ptosis, pupils equal and reactive to light, ears normal shape and contour, canals are clear bilaterally, tympanic membranes are pearly gray with good light reflex, nasal mucosa without erythema and drainage, oropharynx is without erythema, edema or exudates.   Neck: Supple without bruit. Thyroid is not enlarged.  Pulmonary: Clear to ausculation.  Normal effort. No rales, ronchi, or wheezing.  Cardiovascular: Normal rate and rhythm without murmur. Carotid and radial pulses are intact and equal bilaterally.  No lower extremity edema.  Abdomen: Soft, nontender, nondistended. Normal bowel sounds. Liver and spleen are not palpable  Neurologic: Grossly nonfocal  Lymph: No cervical or supraclavicular lymph nodes are palpable  Skin: Warm and dry.    Musculoskeletal: Normal gait.   Psych: Normal mood and affect. Alert and oriented. Judgment and insight is normal.    Component      Latest Ref Rng & Units 4/14/2022           2:51 PM   POC Color      Negative yellow   POC Appearance      Negative clear   POC Leukocyte Esterase      Negative Small   POC Nitrites      Negative neg   POC Urobiligen      Negative (0.2) mg/dL 0.2   POC Protein      Negative mg/dL Neg   POC Urine PH      5.0 - 8.0 7.0   POC Blood      Negative neg   POC Specific " Gravity      <1.005 - >1.030 1.010   POC Ketones      Negative mg/dL neg   POC Bilirubin      Negative mg/dL neg   POC Glucose      Negative mg/dL neg     Component      Latest Ref Rng & Units 2/17/2022   WBC      4.8 - 10.8 K/uL 5.2   RBC      4.20 - 5.40 M/uL 4.38   Hemoglobin      12.0 - 16.0 g/dL 14.1   Hematocrit      37.0 - 47.0 % 40.9   MCV      81.4 - 97.8 fL 93.4   MCH      27.0 - 33.0 pg 32.2   MCHC      33.6 - 35.0 g/dL 34.5   RDW      35.9 - 50.0 fL 41.2   Platelet Count      164 - 446 K/uL 237   MPV      9.0 - 12.9 fL 10.8   Neutrophils-Polys      44.00 - 72.00 % 51.90   Lymphocytes      22.00 - 41.00 % 37.50   Monocytes      0.00 - 13.40 % 8.30   Eosinophils      0.00 - 6.90 % 1.50   Basophils      0.00 - 1.80 % 0.60   Immature Granulocytes      0.00 - 0.90 % 0.20   Nucleated RBC      /100 WBC 0.00   Neutrophils (Absolute)      2.00 - 7.15 K/uL 2.70   Lymphs (Absolute)      1.00 - 4.80 K/uL 1.95   Monos (Absolute)      0.00 - 0.85 K/uL 0.43   Eos (Absolute)      0.00 - 0.51 K/uL 0.08   Baso (Absolute)      0.00 - 0.12 K/uL 0.03   Immature Granulocytes (abs)      0.00 - 0.11 K/uL 0.01   NRBC (Absolute)      K/uL 0.00   Sodium      135 - 145 mmol/L 143   Potassium      3.6 - 5.5 mmol/L 4.0   Chloride      96 - 112 mmol/L 106   Co2      20 - 33 mmol/L 25   Anion Gap      7.0 - 16.0 12.0   Glucose      65 - 99 mg/dL 101 (H)   Bun      8 - 22 mg/dL 15   Creatinine      0.50 - 1.40 mg/dL 0.80   Calcium      8.5 - 10.5 mg/dL 9.6   AST(SGOT)      12 - 45 U/L 25   ALT(SGPT)      2 - 50 U/L 24   Alkaline Phosphatase      30 - 99 U/L 107 (H)   Total Bilirubin      0.1 - 1.5 mg/dL 0.6   Albumin      3.2 - 4.9 g/dL 4.8   Total Protein      6.0 - 8.2 g/dL 7.3   Globulin      1.9 - 3.5 g/dL 2.5   A-G Ratio      g/dL 1.9   Cholesterol,Tot      100 - 199 mg/dL 236 (H)   Triglycerides      0 - 149 mg/dL 43   HDL      >=40 mg/dL 83   LDL      <100 mg/dL 144 (H)   Glycohemoglobin      4.0 - 5.6 % 5.6   Estim. Avg Glu       mg/dL 114   GFR If African American      >60 mL/min/1.73 m 2 >60   GFR If Non African American      >60 mL/min/1.73 m 2 >60   Fasting Status       Fasting       Please note that this dictation was created using voice recognition software. I have made every reasonable attempt to correct obvious errors, but I expect that there are errors of grammar and possibly content that I did not discover before finalizing the note.      Assessment/Plan  1. Vaginal itching  Symptoms unresolved with antifungal therapies.  We will get vaginal pathogens.  Point-of-care urinalysis unremarkable.  If vaginal pathogens results normal, will have patient come back in for pelvic exam.  May be atrophic vaginitis.  Patient agreeable to plan.  - VAGINAL PATHOGENS DNA PANEL; Future  - POCT Urinalysis    2. Dyslipidemia  Reviewed ASCVD risk.  Advised on lifestyle measures including continued routine aerobic exercise, decrease fats in diet.  Handout on low-cholesterol diet given to patient today.

## 2022-04-15 LAB
CANDIDA DNA VAG QL PROBE+SIG AMP: NEGATIVE
G VAGINALIS DNA VAG QL PROBE+SIG AMP: NEGATIVE
T VAGINALIS DNA VAG QL PROBE+SIG AMP: NEGATIVE

## 2022-04-21 ENCOUNTER — OFFICE VISIT (OUTPATIENT)
Dept: MEDICAL GROUP | Facility: PHYSICIAN GROUP | Age: 57
End: 2022-04-21
Payer: COMMERCIAL

## 2022-04-21 VITALS
HEART RATE: 76 BPM | SYSTOLIC BLOOD PRESSURE: 124 MMHG | RESPIRATION RATE: 16 BRPM | HEIGHT: 58 IN | BODY MASS INDEX: 24.86 KG/M2 | DIASTOLIC BLOOD PRESSURE: 72 MMHG | WEIGHT: 118.4 LBS | TEMPERATURE: 99.4 F | OXYGEN SATURATION: 97 %

## 2022-04-21 DIAGNOSIS — N95.2 ATROPHIC VAGINITIS: ICD-10-CM

## 2022-04-21 PROCEDURE — 99214 OFFICE O/P EST MOD 30 MIN: CPT | Performed by: NURSE PRACTITIONER

## 2022-04-21 RX ORDER — CETIRIZINE HYDROCHLORIDE 10 MG/1
10 TABLET ORAL DAILY
COMMUNITY

## 2022-04-21 RX ORDER — ESTRADIOL 0.1 MG/G
CREAM VAGINAL
Qty: 42.5 G | Refills: 3 | Status: SHIPPED | OUTPATIENT
Start: 2022-04-21 | End: 2023-04-18

## 2022-04-21 ASSESSMENT — FIBROSIS 4 INDEX: FIB4 SCORE: 1.21

## 2022-04-21 NOTE — PROGRESS NOTES
CC: Pelvic exam for vaginal issues    HISTORY OF THE PRESENT ILLNESS: Patient is a 56 y.o. female. This pleasant patient is here today, accompanied by , for evaluation and management of the following health problems.  Has been sent down for pelvic exam.        Atrophic vaginitis  HPI 4/14/22: Patient is 56-year-old female here to establish care with me today.  Reports ongoing vaginal itching for at least 6 weeks.  Tried over-the-counter 7-day treatment without improvement.  Then went to urgent care and was prescribed Diflucan, which did not help.  Vaginal pathogens or urinalysis were not done at that visit.  Denies dysuria, hematuria, vaginal discharge.  Not sexually active.    Today's HPI: Vaginal pathogens and point-of-care urine analysis were negative.  Patient returns today for pelvic exam.  Continues with vaginal itching, burning.  Does have history of atrophic vaginitis a few years ago and was managed with Premarin cream.  Reports she very rarely uses the cream as her symptoms were not as constant.  Denies any changes since last appointment.      Allergies: Patient has no known allergies.    Current Outpatient Medications Ordered in Epic   Medication Sig Dispense Refill   • cetirizine (ZYRTEC) 10 MG Tab Take 10 mg by mouth every day.     • NON SPECIFIED Cleanse & apply pea sized (1 full pump) to entire face at night, OR as directed by MD. Use eNurse software on [employer] [place] to guide you daily.     • NON SPECIFIED Gently apply on your face. Leave on for 5-10 minutes. Wash off using water or a wet washcloth. Use eNurse on [employer] [place] to guide you daily.     • NON SPECIFIED Start with a pea size amount to spots at night, OR directions per MD. Use eNurse software on [employer] [place] to guide you daily.     • estradiol (ESTRACE) 0.1 MG/GM vaginal cream Insert 1 GM vaginally every evening for 2 weeks.  Then decrease to 1 GM 3 times a week thereafter. 42.5 g 3   • vitamin D (CHOLECALCIFEROL) 1000 UNIT Tab Take 1,000  "Units by mouth every day.     • Multiple Vitamins-Calcium (ONE-A-DAY WOMENS PO) Take  by mouth.     • CALCIUM 500 500 MG TABS Take 1 Tab by mouth 2 times a day, with meals.     • doxycycline (VIBRAMYCIN) 100 MG Tab Take medication orally once a day, OR taper as directed by MD. Take with a full glass of water on an empty stomach. (Patient not taking: Reported on 4/21/2022)     • Loratadine (CLARITIN PO) Take  by mouth. (Patient not taking: Reported on 4/21/2022)     • DiphenhydrAMINE HCl (BENADRYL ALLERGY PO) Take  by mouth. (Patient not taking: No sig reported)       No current [facility]-ordered facility-administered medications on file.       Past Medical History:   Diagnosis Date   • Allergic rhinitis, cause unspecified    • Atrophic vaginitis 3/9/2017   • History of rhabdomyolysis 6/4/2016   • Irregular menses 10/8/2009   • Liver mass 6/29/2016   • Menopause 3/9/2017   • Oral thrush 11/3/2015   • Pap smear 8/18/08   • Rosacea     sees dermatologist in [place]   • Unspecified hemorrhoids without mention of complication    • Vaginitis 4/15/2015   • Varicose vein        History reviewed. No pertinent surgical history.    Social History     Tobacco Use   • Smoking status: Never Smoker   • Smokeless tobacco: Never Used   Substance Use Topics   • Alcohol use: No     Alcohol/week: 0.0 oz   • Drug use: No       Family History   Problem Relation Age of Onset   • Diabetes Brother        ROS:  As in HPI, otherwise negative for chest pain, dyspnea, abdominal pain, dysuria, blood in stool, fever          Exam: /72 (BP Location: Left arm, Patient Position: Sitting, BP Cuff Size: Adult)   Pulse 76   Temp 37.4 °C (99.4 °F) (Temporal)   Resp 16   Ht 1.473 m (4' 10\")   Wt 53.7 kg (118 lb 6.4 oz)   SpO2 97%  Body mass index is 24.75 kg/m².    General: Alert, pleasant, well nourished, well developed female in NAD  Neck: Supple  Pulmonary:  Normal effort.   Abdomen: Soft, nontender, nondistended.   Neurologic: Grossly " nonfocal  Skin: Warm and dry.    Musculoskeletal: Normal gait.   Psych: Normal mood and affect. Alert and oriented. Judgment and insight is normal.    Pelvic Exam -  Normal external genitalia with no lesions.  Vaginal mucosa with atrophic changes and scant discharge. Cervix with no visible lesions. No cervical motion tenderness. Uterus is normal sized with no masses. No adnexal tenderness or enlargement appreciated.       Please note that this dictation was created using voice recognition software. I have made every reasonable attempt to correct obvious errors, but I expect that there are errors of grammar and possibly content that I did not discover before finalizing the note.      Assessment/Plan  1. Atrophic vaginitis  Advised patient that her symptoms are likely from atrophic vaginitis.  We will start estradiol cream.  Will insert 1 g vaginally daily for 2 weeks, then will decrease to 3 times a week.  Patient will give symptoms 6 to 8 weeks.  If she is still having symptoms, she will let me know and I will refer to gynecology.  - estradiol (ESTRACE) 0.1 MG/GM vaginal cream; Insert 1 GM vaginally every evening for 2 weeks.  Then decrease to 1 GM 3 times a week thereafter.  Dispense: 42.5 g; Refill: 3

## 2022-04-21 NOTE — ASSESSMENT & PLAN NOTE
HPI 4/14/22: Patient is 56-year-old female here to establish care with me today.  Reports ongoing vaginal itching for at least 6 weeks.  Tried over-the-counter 7-day treatment without improvement.  Then went to urgent care and was prescribed Diflucan, which did not help.  Vaginal pathogens or urinalysis were not done at that visit.  Denies dysuria, hematuria, vaginal discharge.  Not sexually active.    Today's HPI: Vaginal pathogens and point-of-care urine analysis were negative.  Patient returns today for pelvic exam.  Continues with vaginal itching, burning.  Does have history of atrophic vaginitis a few years ago and was managed with Premarin cream.  Reports she very rarely uses the cream as her symptoms were not as constant.  Denies any changes since last appointment.

## 2022-10-04 ENCOUNTER — OFFICE VISIT (OUTPATIENT)
Dept: MEDICAL GROUP | Facility: PHYSICIAN GROUP | Age: 57
End: 2022-10-04
Payer: COMMERCIAL

## 2022-10-04 VITALS
RESPIRATION RATE: 14 BRPM | DIASTOLIC BLOOD PRESSURE: 62 MMHG | TEMPERATURE: 98.5 F | HEIGHT: 58 IN | WEIGHT: 119.4 LBS | OXYGEN SATURATION: 96 % | SYSTOLIC BLOOD PRESSURE: 96 MMHG | HEART RATE: 71 BPM | BODY MASS INDEX: 25.06 KG/M2

## 2022-10-04 DIAGNOSIS — H66.002 NON-RECURRENT ACUTE SUPPURATIVE OTITIS MEDIA OF LEFT EAR WITHOUT SPONTANEOUS RUPTURE OF TYMPANIC MEMBRANE: ICD-10-CM

## 2022-10-04 PROCEDURE — 99213 OFFICE O/P EST LOW 20 MIN: CPT | Performed by: NURSE PRACTITIONER

## 2022-10-04 RX ORDER — AMOXICILLIN 500 MG/1
500 CAPSULE ORAL 3 TIMES DAILY
Qty: 21 CAPSULE | Refills: 0 | Status: SHIPPED | OUTPATIENT
Start: 2022-10-04 | End: 2022-11-15

## 2022-10-04 ASSESSMENT — FIBROSIS 4 INDEX: FIB4 SCORE: 1.23

## 2022-10-04 NOTE — PROGRESS NOTES
CC: Ear pain    HISTORY OF THE PRESENT ILLNESS: Patient is a 57 y.o. female. This pleasant patient is here today, accompanied by , for evaluation and management of the following health problems.        Non-recurrent acute suppurative otitis media of left ear without spontaneous rupture of tympanic membrane  Patient reports 1 week onset of intermittent bilateral ear pain.  Does report some tender lymphadenopathy, intermittent.  Denies fever, chills, sore throat, sinus congestion.  She will be flying to the [facility] in 10 days.    Allergies: Patient has no known allergies.    Current Outpatient Medications Ordered in Epic   Medication Sig Dispense Refill    amoxicillin (AMOXIL) 500 MG Cap Take 1 Capsule by mouth 3 times a day. 21 Capsule 0    cetirizine (ZYRTEC) 10 MG Tab Take 10 mg by mouth every day.      estradiol (ESTRACE) 0.1 MG/GM vaginal cream Insert 1 GM vaginally every evening for 2 weeks.  Then decrease to 1 GM 3 times a week thereafter. 42.5 g 3    vitamin D (CHOLECALCIFEROL) 1000 UNIT Tab Take 1,000 Units by mouth every day.      Multiple Vitamins-Calcium (ONE-A-DAY WOMENS PO) Take  by mouth.      CALCIUM 500 500 MG TABS Take 1 Tab by mouth 2 times a day, with meals.       No current Epic-ordered facility-administered medications on file.       Past Medical History:   Diagnosis Date    Allergic rhinitis, cause unspecified     Atrophic vaginitis 3/9/2017    History of rhabdomyolysis 6/4/2016    Irregular menses 10/8/2009    Liver mass 6/29/2016    Menopause 3/9/2017    Oral thrush 11/3/2015    Pap smear 8/18/08    Rosacea     sees dermatologist in [place]    Unspecified hemorrhoids without mention of complication     Vaginitis 4/15/2015    Varicose vein        No past surgical history on file.    Social History     Tobacco Use    Smoking status: Never    Smokeless tobacco: Never   Substance Use Topics    Alcohol use: No     Alcohol/week: 0.0 oz    Drug use: No       Family History   Problem Relation  "Age of Onset    Diabetes Brother        ROS: As in HPI, otherwise negative for chest pain, dyspnea, abdominal pain, dysuria, blood in stool, fever          Exam: BP (!) 96/62 (BP Location: Right arm, Patient Position: Sitting, BP Cuff Size: Adult)   Pulse 71   Temp 36.9 °C (98.5 °F) (Temporal)   Resp 14   Ht 1.473 m (4' 10\")   Wt 54.2 kg (119 lb 6.4 oz)   SpO2 96%  Body mass index is 24.95 kg/m².    General: Alert, pleasant, well nourished, well developed female in NAD  HEENT: Normocephalic. Eyes conjunctiva clear lids without ptosis, ears normal shape and contour, canals are clear bilaterally, left tympanic membrane with erythema and mucus posteriorly, right TM pearly gray with cloudy fluid posteriorly, nasal mucosa without erythema and drainage, oropharynx is without erythema, edema or exudates.   Neck: Supple without bruit. Thyroid is not enlarged.  Pulmonary: Clear to ausculation.  Normal effort. No rales, ronchi, or wheezing.  Cardiovascular: Normal rate and rhythm without murmur. Carotid and radial pulses are intact and equal bilaterally.  No lower extremity edema.  Neurologic: Grossly nonfocal  Lymph: No cervical or supraclavicular lymph nodes are palpable  Skin: Warm and dry.    Musculoskeletal: Normal gait.   Psych: Normal mood and affect. Alert and oriented. Judgment and insight is normal.    Please note that this dictation was created using voice recognition software. I have made every reasonable attempt to correct obvious errors, but I expect that there are errors of grammar and possibly content that I did not discover before finalizing the note.      Assessment/Plan  1. Non-recurrent acute suppurative otitis media of left ear without spontaneous rupture of tympanic membrane  Patient has left otitis media.  Also has mildl cloudy fluid behind right TM.  Will prescribe amoxicillin.  Instructions side effects reviewed with patient.  We will also advised on Flonase nasal spray to help with mild bulging " eardrums.  Advised on Sudafed prior to plane ride.  - amoxicillin (AMOXIL) 500 MG Cap; Take 1 Capsule by mouth 3 times a day.  Dispense: 21 Capsule; Refill: 0

## 2022-10-04 NOTE — ASSESSMENT & PLAN NOTE
Patient reports 1 week onset of intermittent bilateral ear pain.  Does report some tender lymphadenopathy, intermittent.  Denies fever, chills, sore throat, sinus congestion.  She will be flying to the [facility] in 10 days.

## 2022-11-15 ENCOUNTER — OFFICE VISIT (OUTPATIENT)
Dept: MEDICAL GROUP | Facility: PHYSICIAN GROUP | Age: 57
End: 2022-11-15
Payer: COMMERCIAL

## 2022-11-15 VITALS
RESPIRATION RATE: 16 BRPM | BODY MASS INDEX: 25.61 KG/M2 | TEMPERATURE: 97.7 F | SYSTOLIC BLOOD PRESSURE: 120 MMHG | HEART RATE: 65 BPM | HEIGHT: 58 IN | WEIGHT: 122 LBS | DIASTOLIC BLOOD PRESSURE: 70 MMHG | OXYGEN SATURATION: 98 %

## 2022-11-15 DIAGNOSIS — E78.5 DYSLIPIDEMIA: ICD-10-CM

## 2022-11-15 DIAGNOSIS — Z13.6 SCREENING FOR CARDIOVASCULAR CONDITION: ICD-10-CM

## 2022-11-15 DIAGNOSIS — Z13.29 SCREENING FOR THYROID DISORDER: ICD-10-CM

## 2022-11-15 DIAGNOSIS — B37.9 CANDIDIASIS: ICD-10-CM

## 2022-11-15 DIAGNOSIS — R73.09 ELEVATED GLUCOSE: ICD-10-CM

## 2022-11-15 DIAGNOSIS — N90.89 LABIAL LESION: ICD-10-CM

## 2022-11-15 PROCEDURE — 99213 OFFICE O/P EST LOW 20 MIN: CPT | Performed by: NURSE PRACTITIONER

## 2022-11-15 RX ORDER — NYSTATIN 100000 U/G
1 CREAM TOPICAL 2 TIMES DAILY
Qty: 30 G | Refills: 1 | Status: SHIPPED | OUTPATIENT
Start: 2022-11-15

## 2022-11-15 ASSESSMENT — FIBROSIS 4 INDEX: FIB4 SCORE: 1.23

## 2022-11-15 NOTE — ASSESSMENT & PLAN NOTE
Patient reports lump on left labia, onset 3 to 4 weeks ago while or pains.  Reports that has slowly been getting better.  Denies pain, pruritus, drainage.

## 2022-11-15 NOTE — PROGRESS NOTES
CC: Rash in right armpit, bump in vaginal area    HISTORY OF THE PRESENT ILLNESS: Patient is a 57 y.o. female. This pleasant patient is here today accompanied by , for evaluation and management of the following health problems.        Candidiasis  Patient reports 2-week onset of rash right axilla, mildly pruritic, nontender.    Labial lesion  Patient reports lump on left labia, onset 3 to 4 weeks ago while or pains.  Reports that has slowly been getting better.  Denies pain, pruritus, drainage.    Allergies: Patient has no known allergies.    Current Outpatient Medications Ordered in Epic   Medication Sig Dispense Refill    nystatin (MYCOSTATIN) 868934 UNIT/GM Cream topical cream Apply 1 g topically 2 times a day. 30 g 1    cetirizine (ZYRTEC) 10 MG Tab Take 1 Tablet by mouth every day.      estradiol (ESTRACE) 0.1 MG/GM vaginal cream Insert 1 GM vaginally every evening for 2 weeks.  Then decrease to 1 GM 3 times a week thereafter. 42.5 g 3    vitamin D (CHOLECALCIFEROL) 1000 UNIT Tab Take 1 Tablet by mouth every day.      Multiple Vitamins-Calcium (ONE-A-DAY WOMENS PO) Take  by mouth.      CALCIUM 500 500 MG TABS Take 1 Tablet by mouth 2 times a day with meals.       No current Epic-ordered facility-administered medications on file.       Past Medical History:   Diagnosis Date    Allergic rhinitis, cause unspecified     Atrophic vaginitis 3/9/2017    History of rhabdomyolysis 6/4/2016    Irregular menses 10/8/2009    Liver mass 6/29/2016    Menopause 3/9/2017    Oral thrush 11/3/2015    Pap smear 8/18/08    Rosacea     sees dermatologist in [place]    Unspecified hemorrhoids without mention of complication     Vaginitis 4/15/2015    Varicose vein        History reviewed. No pertinent surgical history.    Social History     Tobacco Use    Smoking status: Never    Smokeless tobacco: Never   Substance Use Topics    Alcohol use: No     Alcohol/week: 0.0 oz    Drug use: No       Family History   Problem Relation Age  "of Onset    Diabetes Brother        ROS: As in HPI, otherwise negative for chest pain, dyspnea, abdominal pain, dysuria, blood in stool, fever          Exam: /70 (BP Location: Right arm)   Pulse 65   Temp 36.5 °C (97.7 °F) (Temporal)   Resp 16   Ht 1.473 m (4' 10\")   Wt 55.3 kg (122 lb)   SpO2 98%  Body mass index is 25.5 kg/m².    General: Alert, pleasant, well nourished, well developed female in NAD  Neck: Supple without bruit. Thyroid is not enlarged.  Pulmonary: Clear to ausculation.  Normal effort. No rales, ronchi, or wheezing.  Cardiovascular: Normal rate and rhythm without murmur. Carotid and radial pulses are intact and equal bilaterally.  No lower extremity edema.  Neurologic: Grossly nonfocal  Lymph: No cervical or supraclavicular lymph nodes are palpable  Skin: Warm and dry.  Right axilla with few patches 3 cm x 1 cm with peeling with scattered satelite lesions.  Genitalia: No lesions, no erythema, no palpable masses  Musculoskeletal: Normal gait.   Psych: Normal mood and affect. Alert and oriented. Judgment and insight is normal.    Please note that this dictation was created using voice recognition software. I have made every reasonable attempt to correct obvious errors, but I expect that there are errors of grammar and possibly content that I did not discover before finalizing the note.      Assessment/Plan  1. Candidiasis  Reviewed with patient this is most likely fungal infection.  Will apply nystatin cream twice daily.  If does not start to improve within a couple weeks, patient will let me know.  At that time, consider steroid cream and/or referral to dermatology.  - nystatin (MYCOSTATIN) 641283 UNIT/GM Cream topical cream; Apply 1 g topically 2 times a day.  Dispense: 30 g; Refill: 1    2. Dyslipidemia  Due for updated labs    3. Elevated glucose  We will review lab results at next appointment  - HEMOGLOBIN A1C; Future    4. Screening for thyroid disorder    - TSH WITH REFLEX TO FT4; " Future    5. Screening for cardiovascular condition    - Comp Metabolic Panel; Future  - ESTIMATED GFR; Future  - Lipid Profile; Future  - CBC WITHOUT DIFFERENTIAL; Future    6. Labial lesion  Reviewed possible differentials with patient including ingrown hair, cyst, herpes boil.  No longer visible or palpable.  We will continue to monitor.  Patient will return to clinic if it recurs.

## 2023-02-17 ENCOUNTER — OFFICE VISIT (OUTPATIENT)
Dept: MEDICAL GROUP | Facility: PHYSICIAN GROUP | Age: 58
End: 2023-02-17
Payer: COMMERCIAL

## 2023-02-17 VITALS
RESPIRATION RATE: 16 BRPM | OXYGEN SATURATION: 97 % | BODY MASS INDEX: 25.19 KG/M2 | WEIGHT: 120 LBS | DIASTOLIC BLOOD PRESSURE: 70 MMHG | HEART RATE: 65 BPM | SYSTOLIC BLOOD PRESSURE: 118 MMHG | HEIGHT: 58 IN | TEMPERATURE: 98.2 F

## 2023-02-17 DIAGNOSIS — M79.644 PAIN OF FINGER OF RIGHT HAND: ICD-10-CM

## 2023-02-17 DIAGNOSIS — R09.89 TICKLE IN THROAT: ICD-10-CM

## 2023-02-17 DIAGNOSIS — L60.0 INGROWN TOENAIL OF BOTH FEET: ICD-10-CM

## 2023-02-17 PROBLEM — L60.3 NAIL DYSTROPHY: Status: RESOLVED | Noted: 2019-10-01 | Resolved: 2023-02-17

## 2023-02-17 PROBLEM — N90.89 LABIAL LESION: Status: RESOLVED | Noted: 2022-11-15 | Resolved: 2023-02-17

## 2023-02-17 PROCEDURE — 99213 OFFICE O/P EST LOW 20 MIN: CPT | Performed by: NURSE PRACTITIONER

## 2023-02-17 ASSESSMENT — PATIENT HEALTH QUESTIONNAIRE - PHQ9: CLINICAL INTERPRETATION OF PHQ2 SCORE: 0

## 2023-02-17 ASSESSMENT — FIBROSIS 4 INDEX: FIB4 SCORE: 1.23

## 2023-02-17 NOTE — ASSESSMENT & PLAN NOTE
Patient reports tickle in her throat since URI 2 weeks ago.  Causes her to cough, mostly during the day.  Denies sore throat, fever, difficult swallowing, otalgia.  Has noticed some improvement with throat lozenge.

## 2023-02-17 NOTE — PROGRESS NOTES
CC: Tickle in throat, ingrown toenails    HISTORY OF THE PRESENT ILLNESS: Patient is a 57 y.o. female. This pleasant patient is here today, accompanied by , for evaluation and management of the following health problems.        Tickle in throat  Patient reports tickle in her throat since URI 2 weeks ago.  Causes her to cough, mostly during the day.  Denies sore throat, fever, difficult swallowing, otalgia.  Has noticed some improvement with throat lozenge.    Ingrown toenail of both feet  Patient reports ingrown toenails of both feet.  Tender.  Denies erythema or discharge.    Pain of finger of right hand  Patient reports right long finger intermittent pain.  Is occurs randomly.  Pain is in whole finger.  Lasts only a few minutes.  Patient wonders what causes it.  Wonders if she has arthritis.  Denies trigger finger.    Allergies: Patient has no known allergies.    Current Outpatient Medications Ordered in Epic   Medication Sig Dispense Refill    nystatin (MYCOSTATIN) 086919 UNIT/GM Cream topical cream Apply 1 g topically 2 times a day. 30 g 1    cetirizine (ZYRTEC) 10 MG Tab Take 1 Tablet by mouth every day.      estradiol (ESTRACE) 0.1 MG/GM vaginal cream Insert 1 GM vaginally every evening for 2 weeks.  Then decrease to 1 GM 3 times a week thereafter. 42.5 g 3    vitamin D (CHOLECALCIFEROL) 1000 UNIT Tab Take 1 Tablet by mouth every day.      Multiple Vitamins-Calcium (ONE-A-DAY WOMENS PO) Take  by mouth.      CALCIUM 500 500 MG TABS Take 1 Tablet by mouth 2 times a day with meals.       No current Epic-ordered facility-administered medications on file.       Past Medical History:   Diagnosis Date    Allergic rhinitis, cause unspecified     Atrophic vaginitis 3/9/2017    History of rhabdomyolysis 6/4/2016    Irregular menses 10/8/2009    Liver mass 6/29/2016    Menopause 3/9/2017    Oral thrush 11/3/2015    Pap smear 8/18/08    Rosacea     sees dermatologist in [place]    Unspecified hemorrhoids without  "mention of complication     Vaginitis 4/15/2015    Varicose vein        History reviewed. No pertinent surgical history.    Social History     Tobacco Use    Smoking status: Never    Smokeless tobacco: Never   Substance Use Topics    Alcohol use: No     Alcohol/week: 0.0 oz    Drug use: No       Family History   Problem Relation Age of Onset    Diabetes Brother        ROS: As in HPI, otherwise negative for chest pain, dyspnea, abdominal pain, dysuria, blood in stool, fever         Exam: /70   Pulse 65   Temp 36.8 °C (98.2 °F) (Temporal)   Resp 16   Ht 1.473 m (4' 10\")   Wt 54.4 kg (120 lb)   SpO2 97%  Body mass index is 25.08 kg/m².    General: Alert, pleasant, well nourished, well developed female in NAD  HEENT: Normocephalic. Eyes conjunctiva clear lids without ptosis, ears normal shape and contour, canals are clear bilaterally, tympanic membranes are pearly gray with good light reflex, nasal mucosa without erythema and drainage, oropharynx is with cobblestone texture and without erythema, edema or exudates.   Neck: Supple without bruit. Thyroid is not enlarged.  Pulmonary: Clear to ausculation.  Normal effort. No rales, ronchi, or wheezing.  Cardiovascular: Normal rate and rhythm without murmur. Carotid and radial pulses are intact and equal bilaterally.  No lower extremity edema.  Neurologic: Grossly nonfocal  Lymph: No cervical or supraclavicular lymph nodes are palpable  Skin: Warm and dry.  Bilateral feet with ingrown toenails of all toes.  Musculoskeletal: Normal gait.   Psych: Normal mood and affect. Alert and oriented. Judgment and insight is normal.    Please note that this dictation was created using voice recognition software. I have made every reasonable attempt to correct obvious errors, but I expect that there are errors of grammar and possibly content that I did not discover before finalizing the note.      Assessment/Plan  1. Ingrown toenail of both feet  Will refer to podiatry for " further evaluation.  - Referral to Podiatry    2. Tickle in throat  Some cobblestone texture on exam today, otherwise no abnormalities.  Advised patient to continue throat lozenges, try warm beverages frequently, restart Zyrtec.  Continue to monitor.    3. Pain of finger of right hand  Advised patient this is likely arthritis.  Advised on ice to area during flares of pain.  Consider x-ray if pain continues.    Patient will return to clinic as previously scheduled for lab review in 3 months or sooner if needed.

## 2023-02-17 NOTE — ASSESSMENT & PLAN NOTE
Patient reports right long finger intermittent pain.  Is occurs randomly.  Pain is in whole finger.  Lasts only a few minutes.  Patient wonders what causes it.  Wonders if she has arthritis.  Denies trigger finger.

## 2023-04-17 DIAGNOSIS — N95.2 ATROPHIC VAGINITIS: ICD-10-CM

## 2023-04-18 RX ORDER — ESTRADIOL 0.1 MG/G
CREAM VAGINAL
Qty: 42.5 G | Refills: 3 | Status: SHIPPED | OUTPATIENT
Start: 2023-04-18 | End: 2024-03-05 | Stop reason: SDUPTHER

## 2023-04-18 NOTE — TELEPHONE ENCOUNTER
Last ov 2/17/23    Received request via: Pharmacy    Was the patient seen in the last year in this department? Yes    Does the patient have an active prescription (recently filled or refills available) for medication(s) requested? No    Does the patient have FPC Plus and need 100 day supply (blood pressure, diabetes and cholesterol meds only)? Patient does not have SCP

## 2023-05-09 ENCOUNTER — HOSPITAL ENCOUNTER (OUTPATIENT)
Dept: LAB | Facility: MEDICAL CENTER | Age: 58
End: 2023-05-09
Attending: NURSE PRACTITIONER
Payer: COMMERCIAL

## 2023-05-09 DIAGNOSIS — Z13.29 SCREENING FOR THYROID DISORDER: ICD-10-CM

## 2023-05-09 DIAGNOSIS — R73.09 ELEVATED GLUCOSE: ICD-10-CM

## 2023-05-09 DIAGNOSIS — Z13.6 SCREENING FOR CARDIOVASCULAR CONDITION: ICD-10-CM

## 2023-05-09 LAB
ALBUMIN SERPL BCP-MCNC: 4.6 G/DL (ref 3.2–4.9)
ALBUMIN/GLOB SERPL: 1.6 G/DL
ALP SERPL-CCNC: 92 U/L (ref 30–99)
ALT SERPL-CCNC: 21 U/L (ref 2–50)
ANION GAP SERPL CALC-SCNC: 11 MMOL/L (ref 7–16)
AST SERPL-CCNC: 28 U/L (ref 12–45)
BILIRUB SERPL-MCNC: 0.6 MG/DL (ref 0.1–1.5)
BUN SERPL-MCNC: 19 MG/DL (ref 8–22)
CALCIUM ALBUM COR SERPL-MCNC: 9.3 MG/DL (ref 8.5–10.5)
CALCIUM SERPL-MCNC: 9.8 MG/DL (ref 8.5–10.5)
CHLORIDE SERPL-SCNC: 107 MMOL/L (ref 96–112)
CHOLEST SERPL-MCNC: 241 MG/DL (ref 100–199)
CO2 SERPL-SCNC: 26 MMOL/L (ref 20–33)
CREAT SERPL-MCNC: 0.81 MG/DL (ref 0.5–1.4)
ERYTHROCYTE [DISTWIDTH] IN BLOOD BY AUTOMATED COUNT: 42.5 FL (ref 35.9–50)
EST. AVERAGE GLUCOSE BLD GHB EST-MCNC: 114 MG/DL
FASTING STATUS PATIENT QL REPORTED: NORMAL
GFR SERPLBLD CREATININE-BSD FMLA CKD-EPI: 84 ML/MIN/1.73 M 2
GLOBULIN SER CALC-MCNC: 2.9 G/DL (ref 1.9–3.5)
GLUCOSE SERPL-MCNC: 103 MG/DL (ref 65–99)
HBA1C MFR BLD: 5.6 % (ref 4–5.6)
HCT VFR BLD AUTO: 45.4 % (ref 37–47)
HDLC SERPL-MCNC: 80 MG/DL
HGB BLD-MCNC: 15.2 G/DL (ref 12–16)
LDLC SERPL CALC-MCNC: 148 MG/DL
MCH RBC QN AUTO: 31.7 PG (ref 27–33)
MCHC RBC AUTO-ENTMCNC: 33.5 G/DL (ref 33.6–35)
MCV RBC AUTO: 94.8 FL (ref 81.4–97.8)
PLATELET # BLD AUTO: 246 K/UL (ref 164–446)
PMV BLD AUTO: 10.7 FL (ref 9–12.9)
POTASSIUM SERPL-SCNC: 5 MMOL/L (ref 3.6–5.5)
PROT SERPL-MCNC: 7.5 G/DL (ref 6–8.2)
RBC # BLD AUTO: 4.79 M/UL (ref 4.2–5.4)
SODIUM SERPL-SCNC: 144 MMOL/L (ref 135–145)
TRIGL SERPL-MCNC: 65 MG/DL (ref 0–149)
TSH SERPL DL<=0.005 MIU/L-ACNC: 2.21 UIU/ML (ref 0.38–5.33)
WBC # BLD AUTO: 5.8 K/UL (ref 4.8–10.8)

## 2023-05-09 PROCEDURE — 36415 COLL VENOUS BLD VENIPUNCTURE: CPT

## 2023-05-09 PROCEDURE — 84443 ASSAY THYROID STIM HORMONE: CPT

## 2023-05-09 PROCEDURE — 85027 COMPLETE CBC AUTOMATED: CPT

## 2023-05-09 PROCEDURE — 80061 LIPID PANEL: CPT

## 2023-05-09 PROCEDURE — 80053 COMPREHEN METABOLIC PANEL: CPT

## 2023-05-09 PROCEDURE — 83036 HEMOGLOBIN GLYCOSYLATED A1C: CPT

## 2023-05-16 ENCOUNTER — OFFICE VISIT (OUTPATIENT)
Dept: MEDICAL GROUP | Facility: PHYSICIAN GROUP | Age: 58
End: 2023-05-16
Payer: COMMERCIAL

## 2023-05-16 VITALS
OXYGEN SATURATION: 99 % | BODY MASS INDEX: 24.49 KG/M2 | TEMPERATURE: 97.8 F | SYSTOLIC BLOOD PRESSURE: 120 MMHG | HEIGHT: 58 IN | HEART RATE: 66 BPM | RESPIRATION RATE: 16 BRPM | DIASTOLIC BLOOD PRESSURE: 80 MMHG | WEIGHT: 116.7 LBS

## 2023-05-16 DIAGNOSIS — Z13.6 SCREENING FOR CARDIOVASCULAR CONDITION: ICD-10-CM

## 2023-05-16 DIAGNOSIS — J30.9 ALLERGIC RHINITIS, UNSPECIFIED SEASONALITY, UNSPECIFIED TRIGGER: ICD-10-CM

## 2023-05-16 DIAGNOSIS — J34.89 RHINORRHEA: ICD-10-CM

## 2023-05-16 DIAGNOSIS — N95.2 ATROPHIC VAGINITIS: ICD-10-CM

## 2023-05-16 DIAGNOSIS — E55.9 VITAMIN D DEFICIENCY: ICD-10-CM

## 2023-05-16 DIAGNOSIS — R73.09 ELEVATED GLUCOSE: ICD-10-CM

## 2023-05-16 DIAGNOSIS — E78.5 DYSLIPIDEMIA: ICD-10-CM

## 2023-05-16 PROBLEM — R74.8 ALKALINE PHOSPHATASE ELEVATION: Status: RESOLVED | Noted: 2021-01-18 | Resolved: 2023-05-16

## 2023-05-16 PROBLEM — N89.8 VAGINAL ITCHING: Status: RESOLVED | Noted: 2022-04-14 | Resolved: 2023-05-16

## 2023-05-16 PROBLEM — B37.9 CANDIDIASIS: Status: RESOLVED | Noted: 2022-11-15 | Resolved: 2023-05-16

## 2023-05-16 PROBLEM — R09.89 TICKLE IN THROAT: Status: RESOLVED | Noted: 2023-02-17 | Resolved: 2023-05-16

## 2023-05-16 PROCEDURE — 3079F DIAST BP 80-89 MM HG: CPT | Performed by: NURSE PRACTITIONER

## 2023-05-16 PROCEDURE — 3074F SYST BP LT 130 MM HG: CPT | Performed by: NURSE PRACTITIONER

## 2023-05-16 PROCEDURE — 99213 OFFICE O/P EST LOW 20 MIN: CPT | Performed by: NURSE PRACTITIONER

## 2023-05-16 RX ORDER — AZELASTINE 1 MG/ML
1 SPRAY, METERED NASAL 2 TIMES DAILY
Qty: 30 ML | Refills: 1 | Status: SHIPPED | OUTPATIENT
Start: 2023-05-16

## 2023-05-16 ASSESSMENT — FIBROSIS 4 INDEX: FIB4 SCORE: 1.42

## 2023-05-16 NOTE — PROGRESS NOTES
CC: Lab review, runny nose    HISTORY OF THE PRESENT ILLNESS: Patient is a 57 y.o. female. This pleasant patient is here today, accompanied by , for evaluation and management of the following health problems.        Dyslipidemia  The 10-year ASCVD risk score (Gabbi GRIFFITH, et al., 2019) is: 1.9%      Atrophic vaginitis  This is a chronic health problem that is well controlled with current medications and lifestyle measures.  Using estradiol vaginal cream 2-3 times a week.  Will go 4 weeks without it, then restart when having symptoms.  Seems to be working well for her.    Allergic rhinitis  Chronic health problem.  Reports rhinorrhea and cough that seems to develop in her throat.  Taking Zyrtec and Flonase nasal spray.  Cough seems to come and go.  Denies fever, malaise.    Allergies: Patient has no known allergies.    Current Outpatient Medications Ordered in Epic   Medication Sig Dispense Refill    azelastine (ASTELIN) 137 MCG/SPRAY nasal spray Administer 1 Spray into affected nostril(S) 2 times a day. 30 mL 1    estradiol (ESTRACE) 0.1 MG/GM vaginal cream INSERT 1 GRAM VAGINALLY EVERY EVENING FOR 2 WEEKS, THEN DECREASE TO 1 GRAM 3 TIMES WEEKLY THEREAFTER 42.5 g 3    nystatin (MYCOSTATIN) 850083 UNIT/GM Cream topical cream Apply 1 g topically 2 times a day. 30 g 1    cetirizine (ZYRTEC) 10 MG Tab Take 1 Tablet by mouth every day.      vitamin D (CHOLECALCIFEROL) 1000 UNIT Tab Take 1 Tablet by mouth every day.      Multiple Vitamins-Calcium (ONE-A-DAY WOMENS PO) Take  by mouth.      CALCIUM 500 500 MG TABS Take 1 Tablet by mouth 2 times a day with meals.       No current Epic-ordered facility-administered medications on file.       Past Medical History:   Diagnosis Date    Allergic rhinitis, cause unspecified     Atrophic vaginitis 3/9/2017    History of rhabdomyolysis 6/4/2016    Irregular menses 10/8/2009    Liver mass 6/29/2016    Menopause 3/9/2017    Oral thrush 11/3/2015    Pap smear 8/18/08    Rosacea   "   sees dermatologist in [place]    Unspecified hemorrhoids without mention of complication     Vaginitis 4/15/2015    Varicose vein        History reviewed. No pertinent surgical history.    Social History     Tobacco Use    Smoking status: Never    Smokeless tobacco: Never   Substance Use Topics    Alcohol use: No     Alcohol/week: 0.0 oz    Drug use: No       Family History   Problem Relation Age of Onset    Diabetes Brother        ROS: As in HPI, otherwise negative for chest pain, dyspnea, abdominal pain, dysuria, blood in stool, fever         Exam: /80   Pulse 66   Temp 36.6 °C (97.8 °F) (Temporal)   Resp 16   Ht 1.473 m (4' 10\")   Wt 52.9 kg (116 lb 11.2 oz)   SpO2 99%  Body mass index is 24.39 kg/m².    General: Alert, pleasant, well nourished, well developed female in NAD  HEENT: Normocephalic. Eyes conjunctiva clear lids without ptosis  Neck: Supple without bruit. Thyroid is not enlarged.  Pulmonary: Clear to ausculation.  Normal effort. No rales, ronchi, or wheezing.  Cardiovascular: Normal rate and rhythm without murmur.  No lower extremity edema.  Abdomen: Soft, nontender, nondistended. Normal bowel sounds. Liver and spleen are not palpable  Neurologic: Grossly nonfocal  Lymph: No cervical or supraclavicular lymph nodes are palpable  Skin: Warm and dry.   Musculoskeletal: Normal gait.   Psych: Normal mood and affect. Alert and oriented. Judgment and insight is normal.    Component      Latest Ref Rng 5/9/2023   Sodium      135 - 145 mmol/L 144    Potassium      3.6 - 5.5 mmol/L 5.0    Chloride      96 - 112 mmol/L 107    Co2      20 - 33 mmol/L 26    Anion Gap      7.0 - 16.0  11.0    Glucose      65 - 99 mg/dL 103 (H)    Bun      8 - 22 mg/dL 19    Creatinine      0.50 - 1.40 mg/dL 0.81    Calcium      8.5 - 10.5 mg/dL 9.8    AST(SGOT)      12 - 45 U/L 28    ALT(SGPT)      2 - 50 U/L 21    Alkaline Phosphatase      30 - 99 U/L 92    Total Bilirubin      0.1 - 1.5 mg/dL 0.6    Albumin      3.2 - " 4.9 g/dL 4.6    Total Protein      6.0 - 8.2 g/dL 7.5    Globulin      1.9 - 3.5 g/dL 2.9    A-G Ratio      g/dL 1.6    WBC      4.8 - 10.8 K/uL 5.8    RBC      4.20 - 5.40 M/uL 4.79    Hemoglobin      12.0 - 16.0 g/dL 15.2    Hematocrit      37.0 - 47.0 % 45.4    MCV      81.4 - 97.8 fL 94.8    MCH      27.0 - 33.0 pg 31.7    MCHC      33.6 - 35.0 g/dL 33.5 (L)    RDW      35.9 - 50.0 fL 42.5    Platelet Count      164 - 446 K/uL 246    MPV      9.0 - 12.9 fL 10.7    Cholesterol,Tot      100 - 199 mg/dL 241 (H)    Triglycerides      0 - 149 mg/dL 65    HDL      >=40 mg/dL 80    LDL      <100 mg/dL 148 (H)    Glycohemoglobin      4.0 - 5.6 % 5.6    Estim. Avg Glu      mg/dL 114    TSH      0.380 - 5.330 uIU/mL 2.210    GFR (CKD-EPI)      >60 mL/min/1.73 m 2 84    Fasting Status Fasting    Correct Calcium      8.5 - 10.5 mg/dL 9.3           Please note that this dictation was created using voice recognition software. I have made every reasonable attempt to correct obvious errors, but I expect that there are errors of grammar and possibly content that I did not discover before finalizing the note.      Assessment/Plan  1. Dyslipidemia  Reviewed ASCVD risk and recommendation for routine aerobic exercise, low-fat diet.  Handout on low-fat diet given to patient today.  - Lipid Profile; Future    2. Rhinorrhea  We will trial Astelin nasal spray.  Continue with Flonase and Zyrtec.  If not helpful, advised patient to take Pepcid for a couple weeks to see if this may be silent GERD related.  She will return to clinic if symptoms do not improve.  - azelastine (ASTELIN) 137 MCG/SPRAY nasal spray; Administer 1 Spray into affected nostril(S) 2 times a day.  Dispense: 30 mL; Refill: 1    3. Screening for cardiovascular condition    - ESTIMATED GFR; Future  - Comp Metabolic Panel; Future    4. Vitamin D deficiency    - VITAMIN D,25 HYDROXY (DEFICIENCY); Future    5. Elevated glucose    - HEMOGLOBIN A1C; Future  - TSH WITH REFLEX  TO FT4; Future    6. Atrophic vaginitis  Well-controlled.  Continue with estradiol cream.    7. Allergic rhinitis, unspecified seasonality, unspecified trigger  As in #2    Patient will return to clinic annually or sooner if needed.

## 2023-05-17 NOTE — ASSESSMENT & PLAN NOTE
Chronic health problem.  Reports rhinorrhea and cough that seems to develop in her throat.  Taking Zyrtec and Flonase nasal spray.  Cough seems to come and go.  Denies fever, malaise.

## 2023-05-17 NOTE — ASSESSMENT & PLAN NOTE
This is a chronic health problem that is well controlled with current medications and lifestyle measures.  Using estradiol vaginal cream 2-3 times a week.  Will go 4 weeks without it, then restart when having symptoms.  Seems to be working well for her.

## 2023-08-23 ENCOUNTER — OFFICE VISIT (OUTPATIENT)
Dept: URGENT CARE | Facility: PHYSICIAN GROUP | Age: 58
End: 2023-08-23
Payer: COMMERCIAL

## 2023-08-23 VITALS
OXYGEN SATURATION: 98 % | BODY MASS INDEX: 24.35 KG/M2 | WEIGHT: 116 LBS | HEART RATE: 86 BPM | TEMPERATURE: 97.6 F | RESPIRATION RATE: 16 BRPM | HEIGHT: 58 IN | DIASTOLIC BLOOD PRESSURE: 80 MMHG | SYSTOLIC BLOOD PRESSURE: 138 MMHG

## 2023-08-23 DIAGNOSIS — K64.9 BLEEDING HEMORRHOID: ICD-10-CM

## 2023-08-23 PROCEDURE — 3075F SYST BP GE 130 - 139MM HG: CPT | Performed by: NURSE PRACTITIONER

## 2023-08-23 PROCEDURE — 3079F DIAST BP 80-89 MM HG: CPT | Performed by: NURSE PRACTITIONER

## 2023-08-23 PROCEDURE — 99214 OFFICE O/P EST MOD 30 MIN: CPT | Performed by: NURSE PRACTITIONER

## 2023-08-23 RX ORDER — HYDROCORTISONE ACETATE 25 MG/1
25 SUPPOSITORY RECTAL EVERY 12 HOURS
Qty: 14 SUPPOSITORY | Refills: 0 | Status: SHIPPED | OUTPATIENT
Start: 2023-08-23 | End: 2023-08-30

## 2023-08-23 ASSESSMENT — FIBROSIS 4 INDEX: FIB4 SCORE: 1.42

## 2023-08-23 NOTE — PROGRESS NOTES
"Subjective:   Jenna Real is a 57 y.o. female who presents for Rectal Bleeding (Had bowel movement around noon today and has been bleeding since- bright red, had three movements today, normal stool/Ongoing for over a week)      Patient is a 57-year-old female presents clinic today reporting 1 week history of bright red blood in the toilet after having bowel movements.  She states that her stool is normal.  She does not feel constipated.  She does not have any abdominal pain or pain with bowel movements.  Denies any abdominal bloating, nausea, or vomiting.  She does not have any dizziness, palpitations, headaches, or shortness of breath.  She denies any aspirin or NSAID use.  Patient does have history of internal hemorrhoids stage II.        Medications, Allergies, and current problem list reviewed today in Epic.     Objective:     /80   Pulse 86   Temp 36.4 °C (97.6 °F) (Temporal)   Resp 16   Ht 1.473 m (4' 10\")   Wt 52.6 kg (116 lb)   SpO2 98%     Physical Exam  Vitals reviewed.   Constitutional:       Appearance: Normal appearance.   HENT:      Head: Normocephalic.      Nose: Nose normal.      Mouth/Throat:      Mouth: Mucous membranes are moist.   Eyes:      Extraocular Movements: Extraocular movements intact.      Conjunctiva/sclera: Conjunctivae normal.      Pupils: Pupils are equal, round, and reactive to light.   Cardiovascular:      Rate and Rhythm: Normal rate.   Pulmonary:      Effort: Pulmonary effort is normal.   Abdominal:      General: Abdomen is flat.      Palpations: Abdomen is soft.      Tenderness: There is no abdominal tenderness. There is no guarding or rebound.   Musculoskeletal:         General: Normal range of motion.      Cervical back: Normal range of motion and neck supple.   Skin:     General: Skin is warm and dry.   Neurological:      Mental Status: She is alert and oriented to person, place, and time.   Psychiatric:         Mood and Affect: Mood normal.         " Behavior: Behavior normal.         Thought Content: Thought content normal.         Judgment: Judgment normal.         Assessment/Plan:     Diagnosis and associated orders:     1. Bleeding hemorrhoid  Referral to Gastroenterology    hydrocortisone (ANUSOL-HC) 25 MG Suppos         Comments/MDM:     This is a chronic condition with exacerbation as patient has acute bleeding of chronic internal hemorrhoids.  I have personally reviewed previous primary care provider notes and gastrology colonoscopy reports indicating internal grade/stage II hemorrhoids.  Patient's rectal bleeding is most likely due to bleeding internal hemorrhoid.  The bleeding is not causing any systemic symptoms at this time, vital signs are stable.  Conservative management to be followed. Will consider surgical consult in follow up, especially if there are worsening symptoms. I spent a significant amount of time reviewing sitz baths, hygiene, and prescription hydrocortisone suppository.  ER precautions were discussed.  Referral placed to gastroenterology           Differential diagnosis, natural history, supportive care, and indications for immediate follow-up discussed.    Advised the patient to follow-up with the primary care physician for recheck, reevaluation, and consideration of further management.    Please note that this dictation was created using voice recognition software. I have made a reasonable attempt to correct obvious errors, but I expect that there are errors of grammar and possibly content that I did not discover before finalizing the note.

## 2024-03-05 DIAGNOSIS — N95.2 ATROPHIC VAGINITIS: ICD-10-CM

## 2024-03-05 NOTE — TELEPHONE ENCOUNTER
Requested Prescriptions     Pending Prescriptions Disp Refills    estradiol (ESTRACE) 0.1 MG/GM vaginal cream 42.5 g 3     Sig: INSERT 1 GRAM VAGINALLY EVERY EVENING FOR 2 WEEKS, THEN DECREASE TO 1 GRAM 3 TIMES WEEKLY THEREAFTER      Last office visit: 5/16/23  Last lab: 5/9/23

## 2024-03-06 RX ORDER — ESTRADIOL 0.1 MG/G
CREAM VAGINAL
Qty: 42.5 G | Refills: 3 | Status: SHIPPED | OUTPATIENT
Start: 2024-03-06

## 2024-04-26 ENCOUNTER — HOSPITAL ENCOUNTER (OUTPATIENT)
Dept: LAB | Facility: MEDICAL CENTER | Age: 59
End: 2024-04-26
Attending: NURSE PRACTITIONER
Payer: COMMERCIAL

## 2024-04-26 DIAGNOSIS — E55.9 VITAMIN D DEFICIENCY: ICD-10-CM

## 2024-04-26 DIAGNOSIS — Z13.6 SCREENING FOR CARDIOVASCULAR CONDITION: ICD-10-CM

## 2024-04-26 DIAGNOSIS — R73.09 ELEVATED GLUCOSE: ICD-10-CM

## 2024-04-26 DIAGNOSIS — E78.5 DYSLIPIDEMIA: ICD-10-CM

## 2024-04-26 LAB
25(OH)D3 SERPL-MCNC: 41 NG/ML (ref 30–100)
ALBUMIN SERPL BCP-MCNC: 4.7 G/DL (ref 3.2–4.9)
ALBUMIN/GLOB SERPL: 1.8 G/DL
ALP SERPL-CCNC: 82 U/L (ref 30–99)
ALT SERPL-CCNC: 17 U/L (ref 2–50)
ANION GAP SERPL CALC-SCNC: 11 MMOL/L (ref 7–16)
AST SERPL-CCNC: 27 U/L (ref 12–45)
BILIRUB SERPL-MCNC: 0.7 MG/DL (ref 0.1–1.5)
BUN SERPL-MCNC: 17 MG/DL (ref 8–22)
CALCIUM ALBUM COR SERPL-MCNC: 8.8 MG/DL (ref 8.5–10.5)
CALCIUM SERPL-MCNC: 9.4 MG/DL (ref 8.5–10.5)
CHLORIDE SERPL-SCNC: 105 MMOL/L (ref 96–112)
CHOLEST SERPL-MCNC: 232 MG/DL (ref 100–199)
CO2 SERPL-SCNC: 25 MMOL/L (ref 20–33)
CREAT SERPL-MCNC: 0.78 MG/DL (ref 0.5–1.4)
EST. AVERAGE GLUCOSE BLD GHB EST-MCNC: 111 MG/DL
FASTING STATUS PATIENT QL REPORTED: NORMAL
GFR SERPLBLD CREATININE-BSD FMLA CKD-EPI: 88 ML/MIN/1.73 M 2
GLOBULIN SER CALC-MCNC: 2.6 G/DL (ref 1.9–3.5)
GLUCOSE SERPL-MCNC: 89 MG/DL (ref 65–99)
HBA1C MFR BLD: 5.5 % (ref 4–5.6)
HDLC SERPL-MCNC: 78 MG/DL
LDLC SERPL CALC-MCNC: 144 MG/DL
POTASSIUM SERPL-SCNC: 4.7 MMOL/L (ref 3.6–5.5)
PROT SERPL-MCNC: 7.3 G/DL (ref 6–8.2)
SODIUM SERPL-SCNC: 141 MMOL/L (ref 135–145)
TRIGL SERPL-MCNC: 52 MG/DL (ref 0–149)
TSH SERPL DL<=0.005 MIU/L-ACNC: 1.36 UIU/ML (ref 0.38–5.33)

## 2024-04-26 PROCEDURE — 84443 ASSAY THYROID STIM HORMONE: CPT

## 2024-04-26 PROCEDURE — 83036 HEMOGLOBIN GLYCOSYLATED A1C: CPT

## 2024-04-26 PROCEDURE — 80053 COMPREHEN METABOLIC PANEL: CPT

## 2024-04-26 PROCEDURE — 82306 VITAMIN D 25 HYDROXY: CPT

## 2024-04-26 PROCEDURE — 36415 COLL VENOUS BLD VENIPUNCTURE: CPT

## 2024-04-26 PROCEDURE — 80061 LIPID PANEL: CPT

## 2024-06-18 SDOH — ECONOMIC STABILITY: HOUSING INSECURITY
IN THE LAST 12 MONTHS, WAS THERE A TIME WHEN YOU DID NOT HAVE A STEADY PLACE TO SLEEP OR SLEPT IN A SHELTER (INCLUDING NOW)?: PATIENT DECLINED

## 2024-06-18 SDOH — ECONOMIC STABILITY: HOUSING INSECURITY: IN THE LAST 12 MONTHS, HOW MANY PLACES HAVE YOU LIVED?: 1

## 2024-06-18 SDOH — ECONOMIC STABILITY: HOUSING INSECURITY
IN THE LAST 12 MONTHS, WAS THERE A TIME WHEN YOU DID NOT HAVE A STEADY PLACE TO SLEEP OR SLEPT IN A SHELTER (INCLUDING NOW)?: NO

## 2024-06-18 SDOH — ECONOMIC STABILITY: INCOME INSECURITY: IN THE LAST 12 MONTHS, WAS THERE A TIME WHEN YOU WERE NOT ABLE TO PAY THE MORTGAGE OR RENT ON TIME?: PATIENT DECLINED

## 2024-06-18 SDOH — ECONOMIC STABILITY: TRANSPORTATION INSECURITY
IN THE PAST 12 MONTHS, HAS THE LACK OF TRANSPORTATION KEPT YOU FROM MEDICAL APPOINTMENTS OR FROM GETTING MEDICATIONS?: NO

## 2024-06-18 SDOH — ECONOMIC STABILITY: FOOD INSECURITY: WITHIN THE PAST 12 MONTHS, THE FOOD YOU BOUGHT JUST DIDN'T LAST AND YOU DIDN'T HAVE MONEY TO GET MORE.: PATIENT DECLINED

## 2024-06-18 SDOH — HEALTH STABILITY: MENTAL HEALTH
STRESS IS WHEN SOMEONE FEELS TENSE, NERVOUS, ANXIOUS, OR CAN'T SLEEP AT NIGHT BECAUSE THEIR MIND IS TROUBLED. HOW STRESSED ARE YOU?: NOT AT ALL

## 2024-06-18 SDOH — HEALTH STABILITY: PHYSICAL HEALTH: ON AVERAGE, HOW MANY MINUTES DO YOU ENGAGE IN EXERCISE AT THIS LEVEL?: 60 MIN

## 2024-06-18 SDOH — ECONOMIC STABILITY: INCOME INSECURITY: HOW HARD IS IT FOR YOU TO PAY FOR THE VERY BASICS LIKE FOOD, HOUSING, MEDICAL CARE, AND HEATING?: PATIENT DECLINED

## 2024-06-18 SDOH — ECONOMIC STABILITY: TRANSPORTATION INSECURITY
IN THE PAST 12 MONTHS, HAS LACK OF RELIABLE TRANSPORTATION KEPT YOU FROM MEDICAL APPOINTMENTS, MEETINGS, WORK OR FROM GETTING THINGS NEEDED FOR DAILY LIVING?: NO

## 2024-06-18 SDOH — HEALTH STABILITY: PHYSICAL HEALTH: ON AVERAGE, HOW MANY DAYS PER WEEK DO YOU ENGAGE IN MODERATE TO STRENUOUS EXERCISE (LIKE A BRISK WALK)?: 4 DAYS

## 2024-06-18 SDOH — ECONOMIC STABILITY: TRANSPORTATION INSECURITY
IN THE PAST 12 MONTHS, HAS LACK OF TRANSPORTATION KEPT YOU FROM MEETINGS, WORK, OR FROM GETTING THINGS NEEDED FOR DAILY LIVING?: NO

## 2024-06-18 SDOH — ECONOMIC STABILITY: FOOD INSECURITY: WITHIN THE PAST 12 MONTHS, YOU WORRIED THAT YOUR FOOD WOULD RUN OUT BEFORE YOU GOT MONEY TO BUY MORE.: PATIENT DECLINED

## 2024-06-18 ASSESSMENT — SOCIAL DETERMINANTS OF HEALTH (SDOH)
HOW OFTEN DO YOU HAVE SIX OR MORE DRINKS ON ONE OCCASION: NEVER
HOW OFTEN DO YOU ATTENT MEETINGS OF THE CLUB OR ORGANIZATION YOU BELONG TO?: PATIENT DECLINED
HOW OFTEN DO YOU ATTEND CHURCH OR RELIGIOUS SERVICES?: PATIENT DECLINED
IN THE PAST 12 MONTHS, HAS THE ELECTRIC, GAS, OIL, OR WATER COMPANY THREATENED TO SHUT OFF SERVICE IN YOUR HOME?: PATIENT DECLINED
HOW OFTEN DO YOU ATTEND CHURCH OR RELIGIOUS SERVICES?: PATIENT DECLINED
WITHIN THE PAST 12 MONTHS, YOU WORRIED THAT YOUR FOOD WOULD RUN OUT BEFORE YOU GOT THE MONEY TO BUY MORE: PATIENT DECLINED
DO YOU BELONG TO ANY CLUBS OR ORGANIZATIONS SUCH AS CHURCH GROUPS UNIONS, FRATERNAL OR ATHLETIC GROUPS, OR SCHOOL GROUPS?: PATIENT DECLINED
DO YOU BELONG TO ANY CLUBS OR ORGANIZATIONS SUCH AS CHURCH GROUPS UNIONS, FRATERNAL OR ATHLETIC GROUPS, OR SCHOOL GROUPS?: PATIENT DECLINED
HOW OFTEN DO YOU ATTENT MEETINGS OF THE CLUB OR ORGANIZATION YOU BELONG TO?: PATIENT DECLINED
HOW OFTEN DO YOU HAVE A DRINK CONTAINING ALCOHOL: NEVER
IN A TYPICAL WEEK, HOW MANY TIMES DO YOU TALK ON THE PHONE WITH FAMILY, FRIENDS, OR NEIGHBORS?: PATIENT DECLINED
HOW OFTEN DO YOU GET TOGETHER WITH FRIENDS OR RELATIVES?: PATIENT DECLINED
IN A TYPICAL WEEK, HOW MANY TIMES DO YOU TALK ON THE PHONE WITH FAMILY, FRIENDS, OR NEIGHBORS?: PATIENT DECLINED
HOW MANY DRINKS CONTAINING ALCOHOL DO YOU HAVE ON A TYPICAL DAY WHEN YOU ARE DRINKING: PATIENT DOES NOT DRINK
HOW HARD IS IT FOR YOU TO PAY FOR THE VERY BASICS LIKE FOOD, HOUSING, MEDICAL CARE, AND HEATING?: PATIENT DECLINED
HOW OFTEN DO YOU GET TOGETHER WITH FRIENDS OR RELATIVES?: PATIENT DECLINED

## 2024-06-18 ASSESSMENT — LIFESTYLE VARIABLES
AUDIT-C TOTAL SCORE: 0
HOW MANY STANDARD DRINKS CONTAINING ALCOHOL DO YOU HAVE ON A TYPICAL DAY: PATIENT DOES NOT DRINK
SKIP TO QUESTIONS 9-10: 1
HOW OFTEN DO YOU HAVE SIX OR MORE DRINKS ON ONE OCCASION: NEVER
HOW OFTEN DO YOU HAVE A DRINK CONTAINING ALCOHOL: NEVER

## 2024-06-19 ENCOUNTER — OFFICE VISIT (OUTPATIENT)
Dept: MEDICAL GROUP | Facility: CLINIC | Age: 59
End: 2024-06-19
Payer: COMMERCIAL

## 2024-06-19 VITALS
BODY MASS INDEX: 24.8 KG/M2 | SYSTOLIC BLOOD PRESSURE: 104 MMHG | RESPIRATION RATE: 18 BRPM | HEART RATE: 63 BPM | OXYGEN SATURATION: 99 % | TEMPERATURE: 96.6 F | HEIGHT: 58 IN | DIASTOLIC BLOOD PRESSURE: 76 MMHG | WEIGHT: 118.17 LBS

## 2024-06-19 DIAGNOSIS — K62.5 RECTAL BLEEDING: ICD-10-CM

## 2024-06-19 DIAGNOSIS — K64.9 HEMORRHOIDS, UNSPECIFIED HEMORRHOID TYPE: ICD-10-CM

## 2024-06-19 DIAGNOSIS — E78.2 MIXED HYPERLIPIDEMIA: ICD-10-CM

## 2024-06-19 DIAGNOSIS — L71.9 ROSACEA: ICD-10-CM

## 2024-06-19 PROCEDURE — 3074F SYST BP LT 130 MM HG: CPT | Performed by: PHYSICIAN ASSISTANT

## 2024-06-19 PROCEDURE — 3078F DIAST BP <80 MM HG: CPT | Performed by: PHYSICIAN ASSISTANT

## 2024-06-19 PROCEDURE — 99214 OFFICE O/P EST MOD 30 MIN: CPT | Performed by: PHYSICIAN ASSISTANT

## 2024-06-19 ASSESSMENT — PATIENT HEALTH QUESTIONNAIRE - PHQ9: CLINICAL INTERPRETATION OF PHQ2 SCORE: 0

## 2024-06-19 ASSESSMENT — FIBROSIS 4 INDEX: FIB4 SCORE: 1.54

## 2024-06-19 NOTE — LETTER
[facility]  Ingrid Aleman P.A.-C.  [address]  [place] [address]  Fax: [phone]   Authorization for Release/Disclosure of   Protected Health Information   Name: JC BRANDT : 1965 SSN: [id number]   Address: [address] [place] [address]  [place] [address] Phone:    [phone] (home)    I authorize the entity listed below to release/disclose the PHI below to:   [facility]/Ingrid Aleman P.A.-C. and Ingrid Aleman P.A.-C.   Provider or Entity Name:  \[facility]     Address    [address] [facility] [address]   Phone:  [phone]      Fax:     Reason for request: continuity of care   Information to be released:    [  ] LAST COLONOSCOPY,  including any PATH REPORT and follow-up  [  ] LAST FIT/COLOGUARD RESULT [  ] LAST DEXA  [  ] LAST MAMMOGRAM  [  ] LAST PAP  [  ] LAST LABS [  ] RETINA EXAM REPORT  [  ] IMMUNIZATION RECORDS  [x  ] Release all info      [  ] Check here and initial the line next to each item to release ALL health information INCLUDING  _____ Care and treatment for drug and / or alcohol abuse  _____ HIV testing, infection status, or AIDS  _____ Genetic Testing    DATES OF SERVICE OR TIME PERIOD TO BE DISCLOSED: _____________  I understand and acknowledge that:  * This Authorization may be revoked at any time by you in writing, except if your health information has already been used or disclosed.  * Your health information that will be used or disclosed as a result of you signing this authorization could be re-disclosed by the recipient. If this occurs, your re-disclosed health information may no longer be protected by State or Federal laws.  * You may refuse to sign this Authorization. Your refusal will not affect your ability to obtain treatment.  * This Authorization becomes effective upon signing and will  on (date) __________.      If no date is indicated, this Authorization will  one (1) year from the  signature date.    Name: Jenna Debora Real  Signature: Date:   6/19/2024     PLEASE FAX REQUESTED RECORDS BACK TO: [phone]

## 2024-06-19 NOTE — LETTER
[facility]  Ingrid Aleman P.A.-C.  [address]  [facility] [address]  Fax: [phone]   Authorization for Release/Disclosure of   Protected Health Information   Name: JC BRANDT : 1965 SSN: [id number]   Address: [address] [place], [place] [address] [place] [address] Phone:    [phone] (home)    I authorize the entity listed below to release/disclose the PHI below to:   [facility]/Ingrid Aleman P.A.-C. and Ingrid Aleman P.A.-C.   Provider or Entity Name:  [facility]      Address   City, State, Zip   Phone:      Fax:     Reason for request: continuity of care   Information to be released:    [  ] LAST COLONOSCOPY,  including any PATH REPORT and follow-up  [  ] LAST FIT/COLOGUARD RESULT [  ] LAST DEXA  [ x ] LAST MAMMOGRAM  [  ] LAST PAP  [  ] LAST LABS [  ] RETINA EXAM REPORT  [  ] IMMUNIZATION RECORDS  [  ] Release all info      [  ] Check here and initial the line next to each item to release ALL health information INCLUDING  _____ Care and treatment for drug and / or alcohol abuse  _____ HIV testing, infection status, or AIDS  _____ Genetic Testing    DATES OF SERVICE OR TIME PERIOD TO BE DISCLOSED: _____________  I understand and acknowledge that:  * This Authorization may be revoked at any time by you in writing, except if your health information has already been used or disclosed.  * Your health information that will be used or disclosed as a result of you signing this authorization could be re-disclosed by the recipient. If this occurs, your re-disclosed health information may no longer be protected by State or Federal laws.  * You may refuse to sign this Authorization. Your refusal will not affect your ability to obtain treatment.  * This Authorization becomes effective upon signing and will  on (date) __________.      If no date is indicated, this Authorization will  one (1) year from the signature date.    Name: Jc Brandt  Signature: Date:   2024     PLEASE  FAX REQUESTED RECORDS BACK TO: [phone]

## 2024-06-19 NOTE — PROGRESS NOTES
cc:  hyperlipidemia    Subjective:     Jenna Real is a 58 y.o. female presenting for hyperlipidemia        History of Present Illness  The patient is a 58-year-old female, new patient to me, here to follow up with labs including hyperlipidemia. She is accompanied by an adult male.    The patient denies any alterations in her medical history since her last consultation with Magdalena Quinn. She does not require any medication refills at this time. Her dermatological care is managed by Dr. Rj Tolliver, who prescribes her estradiol and metronidazole creams. The accompanying adult male reports that a dermatologist has requested an autoimmune test due to the patient's skin condition. The patient believes she has undergone a mammogram at [facility], but no other diagnostic tests have been conducted. A colonoscopy was performed by Dr. Mccracken due to persistent bleeding hemorrhoids, however, surgical intervention was not deemed necessary. Currently, she experiences bleeding during defecation and is seeking a second opinion. She occasionally uses hemorrhoid cream for relief. Magdalena Quinn did not discuss the patient's cholesterol levels with medication, but the patient is not interested in pharmacological intervention at this time. She prefers to manage her cholesterol levels through dietary modifications. Despite her fatigue from work, she maintains an active lifestyle, walking 24,000 steps daily.   She denies any family history of heart attack or stroke.       Review of systems:  See above.   Denies any symptoms unless previously indicated.        Current Outpatient Medications:     metronidazole (METROCREAM) 0.75 % cream, Apply 1 Application topically 2 times a day., Disp: , Rfl:     estradiol (ESTRACE) 0.1 MG/GM vaginal cream, INSERT 1 GRAM VAGINALLY EVERY EVENING FOR 2 WEEKS, THEN DECREASE TO 1 GRAM 3 TIMES WEEKLY THEREAFTER, Disp: 42.5 g, Rfl: 3    azelastine (ASTELIN) 137 MCG/SPRAY nasal spray, Administer  "1 Spray into affected nostril(S) 2 times a day., Disp: 30 mL, Rfl: 1    nystatin (MYCOSTATIN) 571915 UNIT/GM Cream topical cream, Apply 1 g topically 2 times a day., Disp: 30 g, Rfl: 1    cetirizine (ZYRTEC) 10 MG Tab, Take 1 Tablet by mouth every day., Disp: , Rfl:     vitamin D (CHOLECALCIFEROL) 1000 UNIT Tab, Take 1 Tablet by mouth every day., Disp: , Rfl:     Multiple Vitamins-Calcium (ONE-A-DAY WOMENS PO), Take  by mouth., Disp: , Rfl:     CALCIUM 500 500 MG TABS, Take 1 Tablet by mouth 2 times a day with meals., Disp: , Rfl:     Allergies, past medical history, past surgical history, family history, social history reviewed and updated    Objective:     Vitals: /76 (BP Location: Left arm, Patient Position: Sitting, BP Cuff Size: Adult)   Pulse 63   Temp 35.9 °C (96.6 °F) (Temporal)   Resp 18   Ht 1.473 m (4' 10\")   Wt 53.6 kg (118 lb 2.7 oz)   SpO2 99%   BMI 24.70 kg/m²   General: Alert, pleasant, NAD  EYES:   PERRL, EOMI, no icterus or pallor.  Conjunctivae and lids normal.   HENT:  Normocephalic.  External ears normal.   Neck supple.     Respiratory: Normal respiratory effort.    Abdomen: Not obese  Skin: Warm, dry, no rashes.  Musculoskeletal: Gait is normal.  Moves all extremities well.    Extremities: normal range of motion all extremities.   Neurological: No tremors, sensation grossly intact,  CN2-12 intact.  Psych:  Affect/mood is normal, judgement is good, memory is intact, grooming is appropriate.      Results  Laboratory Studies  Fasting sugar is 89. Average sugar for a 3-month period is in normal range at 5.5. Cholesterol is not increasing. Triglycerides are well controlled. HDL is at a really good level.      Latest Reference Range & Units 04/26/24 11:21   Sodium 135 - 145 mmol/L 141   Potassium 3.6 - 5.5 mmol/L 4.7   Chloride 96 - 112 mmol/L 105   Co2 20 - 33 mmol/L 25   Anion Gap 7.0 - 16.0  11.0   Glucose 65 - 99 mg/dL 89   Bun 8 - 22 mg/dL 17   Creatinine 0.50 - 1.40 mg/dL 0.78 "   GFR (CKD-EPI) >60 mL/min/1.73 m 2 88   Calcium 8.5 - 10.5 mg/dL 9.4   Correct Calcium 8.5 - 10.5 mg/dL 8.8   AST(SGOT) 12 - 45 U/L 27   ALT(SGPT) 2 - 50 U/L 17   Alkaline Phosphatase 30 - 99 U/L 82   Total Bilirubin 0.1 - 1.5 mg/dL 0.7   Albumin 3.2 - 4.9 g/dL 4.7   Total Protein 6.0 - 8.2 g/dL 7.3   Globulin 1.9 - 3.5 g/dL 2.6   A-G Ratio g/dL 1.8   Glycohemoglobin 4.0 - 5.6 % 5.5   Estim. Avg Glu mg/dL 111   Fasting Status  Fasting   Cholesterol,Tot 100 - 199 mg/dL 232 (H)   Triglycerides 0 - 149 mg/dL 52   HDL >=40 mg/dL 78   LDL <100 mg/dL 144 (H)   25-Hydroxy   Vitamin D 25 30 - 100 ng/mL 41   TSH 0.380 - 5.330 uIU/mL 1.360   (H): Data is abnormally high    Assessment/Plan:     Jenna was seen today for establish care.    Diagnoses and all orders for this visit:    Mixed hyperlipidemia  -     Lipid Profile; Future  -     Comp Metabolic Panel; Future    Hemorrhoids, unspecified hemorrhoid type  -     Referral to Gastroenterology    Rectal bleeding  -     Referral to Gastroenterology    Rosacea        Assessment & Plan  1. Mixed hyperlipidemia.  The patient's laboratory results remain stable. Notably, the patient has an elevated HDL level, which is beneficial to compensate for her elevated LDL level. The patient has chosen to decline medication at this time. The patient will persist with her diet and exercise regimen. A repeat laboratory test will be conducted in 6 months.    2. Hemorrhoids/rectal bleeding.  Given the patient's ongoing issue with no improvement, a referral to a gastroenterologist will be submitted. The patient's previous colonoscopy was performed in approximately 2019.    3. Rosacea.  I do not have any records from dermatology. It is unclear what lab work they are needing. I have requested records and recommend the patient follow up with dermatology to discuss having the dermatology order labs.    Return in about 6 months (around 12/19/2024), or if symptoms worsen or fail to improve.    Please  note that this dictation was created using voice recognition software. I have made every reasonable attempt to correct obvious errors, but expect that there are errors of grammar and possible content that I did not discover before finalizing note.

## 2024-09-03 ENCOUNTER — OFFICE VISIT (OUTPATIENT)
Dept: URGENT CARE | Facility: PHYSICIAN GROUP | Age: 59
End: 2024-09-03
Payer: COMMERCIAL

## 2024-09-03 VITALS
DIASTOLIC BLOOD PRESSURE: 74 MMHG | TEMPERATURE: 97.8 F | HEIGHT: 58 IN | BODY MASS INDEX: 25.19 KG/M2 | RESPIRATION RATE: 16 BRPM | OXYGEN SATURATION: 98 % | WEIGHT: 120 LBS | SYSTOLIC BLOOD PRESSURE: 118 MMHG | HEART RATE: 96 BPM

## 2024-09-03 DIAGNOSIS — H00.014 HORDEOLUM EXTERNUM OF LEFT UPPER EYELID: ICD-10-CM

## 2024-09-03 PROCEDURE — 3074F SYST BP LT 130 MM HG: CPT | Performed by: FAMILY MEDICINE

## 2024-09-03 PROCEDURE — 99213 OFFICE O/P EST LOW 20 MIN: CPT | Performed by: FAMILY MEDICINE

## 2024-09-03 PROCEDURE — 3078F DIAST BP <80 MM HG: CPT | Performed by: FAMILY MEDICINE

## 2024-09-03 ASSESSMENT — FIBROSIS 4 INDEX: FIB4 SCORE: 1.54

## 2024-09-03 ASSESSMENT — ENCOUNTER SYMPTOMS: FEVER: 0

## 2024-09-03 NOTE — PROGRESS NOTES
Subjective:     Jenna Real is a 58 y.o. female who presents for Eye Problem ((L) eye, pt states she has a little bump that showed up x 1 day)    HPI  Pt presents for evaluation of an acute problem  Patient with left eye problem which started over the past day  Patient with a small bump in the upper eyelid  Area has grown a little bit since onset  No drainage from the area  No discharge from the eye, no eye redness, and no changes in vision  No significant pain in the area, just very itchy    Review of Systems   Constitutional:  Negative for fever.   Skin:  Positive for rash.     PMH:  has a past medical history of Allergic rhinitis, cause unspecified, Atrophic vaginitis (3/9/2017), History of rhabdomyolysis (6/4/2016), Irregular menses (10/8/2009), Liver mass (6/29/2016), Menopause (3/9/2017), Oral thrush (11/3/2015), Pap smear (8/18/08), Rosacea, Unspecified hemorrhoids without mention of complication, Vaginitis (4/15/2015), and Varicose vein.  MEDS:   Current Outpatient Medications:     metronidazole (METROCREAM) 0.75 % cream, Apply 1 Application topically 2 times a day., Disp: , Rfl:     estradiol (ESTRACE) 0.1 MG/GM vaginal cream, INSERT 1 GRAM VAGINALLY EVERY EVENING FOR 2 WEEKS, THEN DECREASE TO 1 GRAM 3 TIMES WEEKLY THEREAFTER, Disp: 42.5 g, Rfl: 3    azelastine (ASTELIN) 137 MCG/SPRAY nasal spray, Administer 1 Spray into affected nostril(S) 2 times a day., Disp: 30 mL, Rfl: 1    nystatin (MYCOSTATIN) 015009 UNIT/GM Cream topical cream, Apply 1 g topically 2 times a day., Disp: 30 g, Rfl: 1    cetirizine (ZYRTEC) 10 MG Tab, Take 1 Tablet by mouth every day., Disp: , Rfl:     vitamin D (CHOLECALCIFEROL) 1000 UNIT Tab, Take 1 Tablet by mouth every day., Disp: , Rfl:     Multiple Vitamins-Calcium (ONE-A-DAY WOMENS PO), Take  by mouth., Disp: , Rfl:     CALCIUM 500 500 MG TABS, Take 1 Tablet by mouth 2 times a day with meals., Disp: , Rfl:   ALLERGIES: No Known Allergies  SURGHX: History reviewed. No  "pertinent surgical history.  SOCHX:  reports that she has never smoked. She has never used smokeless tobacco. She reports that she does not drink alcohol and does not use drugs.     Objective:   /74   Pulse 96   Temp 36.6 °C (97.8 °F) (Temporal)   Resp 16   Ht 1.473 m (4' 10\")   Wt 54.4 kg (120 lb) Comment: per pt  SpO2 98%   BMI 25.08 kg/m²     Physical Exam  Constitutional:       General: She is not in acute distress.     Appearance: She is well-developed. She is not diaphoretic.   Eyes:      Extraocular Movements: Extraocular movements intact.      Conjunctiva/sclera: Conjunctivae normal.      Pupils: Pupils are equal, round, and reactive to light.      Comments: Left upper eyelid with small papule at the eyelash line with minimal surrounding erythema, no active drainage   Pulmonary:      Effort: Pulmonary effort is normal.   Neurological:      Mental Status: She is alert.         Assessment/Plan:   Assessment    1. Hordeolum externum of left upper eyelid    Patient with left upper eyelid stye.  Reviewed treatment approaches, supportive care measures, and expected course recovery.  Follow-up in the urgent care if not resolving as expected.  "

## 2024-12-16 ENCOUNTER — OFFICE VISIT (OUTPATIENT)
Dept: MEDICAL GROUP | Facility: CLINIC | Age: 59
End: 2024-12-16
Payer: COMMERCIAL

## 2024-12-16 VITALS
WEIGHT: 125.66 LBS | RESPIRATION RATE: 14 BRPM | HEIGHT: 58 IN | HEART RATE: 67 BPM | DIASTOLIC BLOOD PRESSURE: 66 MMHG | BODY MASS INDEX: 26.38 KG/M2 | TEMPERATURE: 97.4 F | SYSTOLIC BLOOD PRESSURE: 124 MMHG | OXYGEN SATURATION: 99 %

## 2024-12-16 DIAGNOSIS — Z23 NEED FOR VACCINATION: ICD-10-CM

## 2024-12-16 DIAGNOSIS — K64.9 HEMORRHOIDS, UNSPECIFIED HEMORRHOID TYPE: ICD-10-CM

## 2024-12-16 DIAGNOSIS — E78.2 MIXED HYPERLIPIDEMIA: ICD-10-CM

## 2024-12-16 DIAGNOSIS — Z12.31 ENCOUNTER FOR SCREENING MAMMOGRAM FOR BREAST CANCER: ICD-10-CM

## 2024-12-16 DIAGNOSIS — R07.89 OTHER CHEST PAIN: ICD-10-CM

## 2024-12-16 PROCEDURE — 3074F SYST BP LT 130 MM HG: CPT | Performed by: PHYSICIAN ASSISTANT

## 2024-12-16 PROCEDURE — 3078F DIAST BP <80 MM HG: CPT | Performed by: PHYSICIAN ASSISTANT

## 2024-12-16 PROCEDURE — 90471 IMMUNIZATION ADMIN: CPT

## 2024-12-16 PROCEDURE — 93000 ELECTROCARDIOGRAM COMPLETE: CPT | Performed by: PHYSICIAN ASSISTANT

## 2024-12-16 PROCEDURE — 99214 OFFICE O/P EST MOD 30 MIN: CPT | Mod: 25 | Performed by: PHYSICIAN ASSISTANT

## 2024-12-16 PROCEDURE — 90656 IIV3 VACC NO PRSV 0.5 ML IM: CPT

## 2024-12-16 RX ORDER — ALBUTEROL SULFATE 90 UG/1
2 INHALANT RESPIRATORY (INHALATION) EVERY 4 HOURS PRN
Qty: 18 G | Refills: 2 | Status: SHIPPED | OUTPATIENT
Start: 2024-12-16

## 2024-12-16 ASSESSMENT — FIBROSIS 4 INDEX: FIB4 SCORE: 1.57

## 2024-12-17 NOTE — PROGRESS NOTES
cc:  chest pain    Subjective:     Jenna Real is a 59 y.o. female presenting for chest pain        History of Present Illness  The patient is a 59-year-old female who presents to the office today complaining of chest pain.    She reports experiencing chest pain that began last month, describing it as a sudden onset of discomfort rather than a rapid heartbeat. The pain is intermittent, with no identifiable triggers or alleviating factors. She does not experience shortness of breath or tachycardia. The pain is not associated with food intake and can occur during both physical activity and rest. She recalls the onset of these symptoms coinciding with her hemorrhoid treatment in 10/2024. She has been making dietary modifications, including reducing her coffee intake and eliminating caffeine from her diet. She does not consume energy drinks.    Supplemental Information  She has a history of hemorrhoids, which were treated with rubber band ligation in 10/2024. She has not been diagnosed with asthma.    SOCIAL HISTORY  She works as a .    FAMILY HISTORY  She does not have a family history of thyroid problems.       Review of systems:  See above.   Denies any symptoms unless previously indicated.        Current Outpatient Medications:     albuterol 108 (90 Base) MCG/ACT Aero Soln inhalation aerosol, Inhale 2 Puffs every four hours as needed for Shortness of Breath., Disp: 18 g, Rfl: 2    metronidazole (METROCREAM) 0.75 % cream, Apply 1 Application topically 2 times a day., Disp: , Rfl:     estradiol (ESTRACE) 0.1 MG/GM vaginal cream, INSERT 1 GRAM VAGINALLY EVERY EVENING FOR 2 WEEKS, THEN DECREASE TO 1 GRAM 3 TIMES WEEKLY THEREAFTER, Disp: 42.5 g, Rfl: 3    azelastine (ASTELIN) 137 MCG/SPRAY nasal spray, Administer 1 Spray into affected nostril(S) 2 times a day., Disp: 30 mL, Rfl: 1    cetirizine (ZYRTEC) 10 MG Tab, Take 1 Tablet by mouth every day., Disp: , Rfl:     vitamin D (CHOLECALCIFEROL) 1000 UNIT Tab,  "Take 1 Tablet by mouth every day., Disp: , Rfl:     Multiple Vitamins-Calcium (ONE-A-DAY WOMENS PO), Take  by mouth., Disp: , Rfl:     CALCIUM 500 500 MG TABS, Take 1 Tablet by mouth 2 times a day with meals., Disp: , Rfl:     nystatin (MYCOSTATIN) 173968 UNIT/GM Cream topical cream, Apply 1 g topically 2 times a day., Disp: 30 g, Rfl: 1    Allergies, past medical history, past surgical history, family history, social history reviewed and updated    Objective:     Vitals: /66 (BP Location: Left arm, Patient Position: Sitting, BP Cuff Size: Adult)   Pulse 67   Temp 36.3 °C (97.4 °F) (Temporal)   Resp 14   Ht 1.473 m (4' 10\")   Wt 57 kg (125 lb 10.6 oz)   SpO2 99%   BMI 26.26 kg/m²   General: Alert, pleasant, NAD  EYES:   PERRL, EOMI, no icterus or pallor.  Conjunctivae and lids normal.   HENT:  Normocephalic.  External ears normal.   Neck supple.  No carotid thrills or bruits  Heart: Regular rate and rhythm.  S1 and S2 normal.  No murmurs appreciated.  Respiratory: Normal respiratory effort.  Clear to auscultation bilaterally.  Abdomen: Not obese  Skin: Warm, dry, no rashes.  Musculoskeletal: Gait is normal.  Moves all extremities well.    Extremities: normal range of motion all extremities.   Neurological: No tremors, sensation grossly intact,  CN2-12 intact.  Psych:  Affect/mood is normal, judgement is good, memory is intact, grooming is appropriate.    Physical Exam  There is a slight decrease in sound in the right lung.  Heart sounds are normal.       Results  Laboratory Studies  Thyroid test was normal in April 2024.    Testing  EKG shows normal sinus, normal rhythm with no ST elevation or depression.       Assessment/Plan:     Jenna was seen today for chest pressure.    Diagnoses and all orders for this visit:    Other chest pain  -     STRESS TEST  -     DX-CHEST-2 VIEWS; Future  -     albuterol 108 (90 Base) MCG/ACT Aero Soln inhalation aerosol; Inhale 2 Puffs every four hours as needed for " Shortness of Breath.  -     Lipid Profile; Future  -     Comp Metabolic Panel; Future  -     TSH WITH REFLEX TO FT4; Future    Mixed hyperlipidemia  -     Lipid Profile; Future  -     Comp Metabolic Panel; Future    Encounter for screening mammogram for breast cancer  -     MA-SCREENING MAMMO BILAT W/TOMOSYNTHESIS W/CAD; Future    Need for vaccination  -     INFLUENZA VACCINE TRI INJ (PF)     Hemorrhoids, unspecified hemorrhoid type        Assessment & Plan  1. Chest pain.  Her EKG results are within normal limits, showing normal sinus rhythm with no ST elevation or depression. There is no previous EKG for comparison. She reports that the chest pain started last month and is not associated with food intake or physical activity. She does not experience shortness of breath or a fast heart rate. Her thyroid function was normal in April 2024. A stress test will be ordered to further evaluate her cardiac function. A chest x-ray will also be conducted. She will be prescribed an albuterol inhaler, to be used as needed, specifically 2 puffs every 4 to 6 hours. She has been advised to maintain a symptom diary to track the occurrence of her symptoms. A prescription for albuterol has been sent to [facility] in [place].  New lab orders have been placed to monitor her thyroid function. She will receive her influenza vaccine during this visit.    Follow-up  The patient will follow up in 6 weeks.    PROCEDURE  The patient underwent rubber band ligation for hemorrhoids in 10/2023.    Return in about 6 weeks (around 1/27/2025), or if symptoms worsen or fail to improve, for 6-8 weeks sooner if needed. .    Please note that this dictation was created using voice recognition software. I have made every reasonable attempt to correct obvious errors, but expect that there are errors of grammar and possible content that I did not discover before finalizing note.

## 2024-12-23 ENCOUNTER — HOSPITAL ENCOUNTER (OUTPATIENT)
Dept: RADIOLOGY | Facility: MEDICAL CENTER | Age: 59
End: 2024-12-23
Payer: COMMERCIAL

## 2024-12-30 ENCOUNTER — APPOINTMENT (OUTPATIENT)
Dept: RADIOLOGY | Facility: MEDICAL CENTER | Age: 59
End: 2024-12-30
Attending: PHYSICIAN ASSISTANT
Payer: COMMERCIAL

## 2024-12-30 DIAGNOSIS — Z12.31 ENCOUNTER FOR SCREENING MAMMOGRAM FOR BREAST CANCER: ICD-10-CM

## 2024-12-30 DIAGNOSIS — R91.1 LUNG NODULE: ICD-10-CM

## 2024-12-30 DIAGNOSIS — R07.89 OTHER CHEST PAIN: ICD-10-CM

## 2024-12-30 PROCEDURE — 77067 SCR MAMMO BI INCL CAD: CPT

## 2024-12-30 PROCEDURE — 71046 X-RAY EXAM CHEST 2 VIEWS: CPT

## 2025-01-06 ENCOUNTER — HOSPITAL ENCOUNTER (OUTPATIENT)
Dept: LAB | Facility: MEDICAL CENTER | Age: 60
End: 2025-01-06
Attending: PHYSICIAN ASSISTANT
Payer: COMMERCIAL

## 2025-01-06 DIAGNOSIS — R07.89 OTHER CHEST PAIN: ICD-10-CM

## 2025-01-06 DIAGNOSIS — E78.2 MIXED HYPERLIPIDEMIA: ICD-10-CM

## 2025-01-06 LAB
ALBUMIN SERPL BCP-MCNC: 4.4 G/DL (ref 3.2–4.9)
ALBUMIN/GLOB SERPL: 1.6 G/DL
ALP SERPL-CCNC: 93 U/L (ref 30–99)
ALT SERPL-CCNC: 19 U/L (ref 2–50)
ANION GAP SERPL CALC-SCNC: 13 MMOL/L (ref 7–16)
AST SERPL-CCNC: 21 U/L (ref 12–45)
BILIRUB SERPL-MCNC: 0.6 MG/DL (ref 0.1–1.5)
BUN SERPL-MCNC: 11 MG/DL (ref 8–22)
CALCIUM ALBUM COR SERPL-MCNC: 8.8 MG/DL (ref 8.5–10.5)
CALCIUM SERPL-MCNC: 9.1 MG/DL (ref 8.5–10.5)
CHLORIDE SERPL-SCNC: 106 MMOL/L (ref 96–112)
CHOLEST SERPL-MCNC: 221 MG/DL (ref 100–199)
CO2 SERPL-SCNC: 24 MMOL/L (ref 20–33)
CREAT SERPL-MCNC: 0.65 MG/DL (ref 0.5–1.4)
FASTING STATUS PATIENT QL REPORTED: NORMAL
GFR SERPLBLD CREATININE-BSD FMLA CKD-EPI: 101 ML/MIN/1.73 M 2
GLOBULIN SER CALC-MCNC: 2.7 G/DL (ref 1.9–3.5)
GLUCOSE SERPL-MCNC: 99 MG/DL (ref 65–99)
HDLC SERPL-MCNC: 77 MG/DL
LDLC SERPL CALC-MCNC: 128 MG/DL
POTASSIUM SERPL-SCNC: 3.7 MMOL/L (ref 3.6–5.5)
PROT SERPL-MCNC: 7.1 G/DL (ref 6–8.2)
SODIUM SERPL-SCNC: 143 MMOL/L (ref 135–145)
TRIGL SERPL-MCNC: 79 MG/DL (ref 0–149)
TSH SERPL DL<=0.005 MIU/L-ACNC: 1.47 UIU/ML (ref 0.38–5.33)

## 2025-01-06 PROCEDURE — 80061 LIPID PANEL: CPT

## 2025-01-06 PROCEDURE — 36415 COLL VENOUS BLD VENIPUNCTURE: CPT

## 2025-01-06 PROCEDURE — 84443 ASSAY THYROID STIM HORMONE: CPT

## 2025-01-06 PROCEDURE — 80053 COMPREHEN METABOLIC PANEL: CPT

## 2025-01-11 ENCOUNTER — HOSPITAL ENCOUNTER (OUTPATIENT)
Dept: RADIOLOGY | Facility: MEDICAL CENTER | Age: 60
End: 2025-01-11
Attending: PHYSICIAN ASSISTANT
Payer: COMMERCIAL

## 2025-01-11 DIAGNOSIS — R91.1 LUNG NODULE: ICD-10-CM

## 2025-01-11 PROCEDURE — 71250 CT THORAX DX C-: CPT

## 2025-01-27 DIAGNOSIS — R07.89 OTHER CHEST PAIN: ICD-10-CM

## 2025-01-27 NOTE — TELEPHONE ENCOUNTER
Received request via: Patient    Was the patient seen in the last year in this department? Yes    Does the patient have an active prescription (recently filled or refills available) for medication(s) requested?  yes    Pharmacy Name: [facility] in [place]    Does the patient have retirement Plus and need 100-day supply? (This applies to ALL medications) Patient does not have SCP

## 2025-01-28 RX ORDER — ALBUTEROL SULFATE 90 UG/1
2 INHALANT RESPIRATORY (INHALATION) EVERY 4 HOURS PRN
Qty: 18 G | Refills: 2 | Status: SHIPPED | OUTPATIENT
Start: 2025-01-28

## 2025-02-06 ENCOUNTER — OFFICE VISIT (OUTPATIENT)
Dept: MEDICAL GROUP | Facility: CLINIC | Age: 60
End: 2025-02-06
Payer: COMMERCIAL

## 2025-02-06 VITALS
WEIGHT: 124.12 LBS | BODY MASS INDEX: 26.05 KG/M2 | RESPIRATION RATE: 14 BRPM | HEART RATE: 61 BPM | TEMPERATURE: 97.6 F | DIASTOLIC BLOOD PRESSURE: 64 MMHG | OXYGEN SATURATION: 99 % | HEIGHT: 58 IN | SYSTOLIC BLOOD PRESSURE: 108 MMHG

## 2025-02-06 DIAGNOSIS — K44.9 HIATAL HERNIA: ICD-10-CM

## 2025-02-06 DIAGNOSIS — E78.5 DYSLIPIDEMIA: ICD-10-CM

## 2025-02-06 DIAGNOSIS — R07.89 OTHER CHEST PAIN: ICD-10-CM

## 2025-02-06 PROCEDURE — 3078F DIAST BP <80 MM HG: CPT | Performed by: PHYSICIAN ASSISTANT

## 2025-02-06 PROCEDURE — 3074F SYST BP LT 130 MM HG: CPT | Performed by: PHYSICIAN ASSISTANT

## 2025-02-06 PROCEDURE — 99214 OFFICE O/P EST MOD 30 MIN: CPT | Performed by: PHYSICIAN ASSISTANT

## 2025-02-06 ASSESSMENT — FIBROSIS 4 INDEX: FIB4 SCORE: 1.16

## 2025-02-06 ASSESSMENT — PATIENT HEALTH QUESTIONNAIRE - PHQ9: CLINICAL INTERPRETATION OF PHQ2 SCORE: 0

## 2025-02-06 NOTE — PROGRESS NOTES
cc:  chest pain    Subjective:     Jenna Real is a 59 y.o. female presenting for chest pain        History of Present Illness  The patient is a 59-year-old female who presents to the office today to follow up on testing as she had previously reported chest pain. She did have a chest x-ray which showed a lung nodule, however, CT did not show any lung nodule, but it did show a hiatal hernia. Although notes indicate ordering a stress test, this was not done.    She reports an improvement in her chest pain, with no recent episodes of severe pain or difficulty breathing. She expresses interest in undergoing a stress test and feels capable of performing the necessary physical exertion for the test. She maintains an active lifestyle, including walking 2 to 3 miles at home and incorporating walking into her work routine.       Review of systems:  See above.   Denies any symptoms unless previously indicated.        Current Outpatient Medications:     metronidazole (METROCREAM) 0.75 % cream, Apply 1 Application topically 2 times a day., Disp: , Rfl:     estradiol (ESTRACE) 0.1 MG/GM vaginal cream, INSERT 1 GRAM VAGINALLY EVERY EVENING FOR 2 WEEKS, THEN DECREASE TO 1 GRAM 3 TIMES WEEKLY THEREAFTER, Disp: 42.5 g, Rfl: 3    cetirizine (ZYRTEC) 10 MG Tab, Take 1 Tablet by mouth every day., Disp: , Rfl:     vitamin D (CHOLECALCIFEROL) 1000 UNIT Tab, Take 1 Tablet by mouth every day., Disp: , Rfl:     Multiple Vitamins-Calcium (ONE-A-DAY WOMENS PO), Take  by mouth., Disp: , Rfl:     CALCIUM 500 500 MG TABS, Take 1 Tablet by mouth 2 times a day with meals., Disp: , Rfl:     albuterol 108 (90 Base) MCG/ACT Aero Soln inhalation aerosol, INHALE 2 PUFFS BY MOUTH EVERY 4 HOURS AS NEEDED FOR SHORTNESS OF BREATH (Patient not taking: Reported on 2/6/2025), Disp: 18 g, Rfl: 2    azelastine (ASTELIN) 137 MCG/SPRAY nasal spray, Administer 1 Spray into affected nostril(S) 2 times a day. (Patient not taking: Reported on 2/6/2025), Disp:  "30 mL, Rfl: 1    Allergies, past medical history, past surgical history, family history, social history reviewed and updated    Objective:     Vitals: /64 (BP Location: Left arm, Patient Position: Sitting, BP Cuff Size: Adult)   Pulse 61   Temp 36.4 °C (97.6 °F) (Temporal)   Resp 14   Ht 1.473 m (4' 10\")   Wt 56.3 kg (124 lb 1.9 oz)   SpO2 99%   BMI 25.94 kg/m²   General: Alert, pleasant, NAD  EYES:   PERRL, EOMI, no icterus or pallor.  Conjunctivae and lids normal.   HENT:  Normocephalic.  External ears normal.   Neck supple.     Heart: Regular rate and rhythm.  S1 and S2 normal.  No murmurs appreciated.  Respiratory: Normal respiratory effort.  Clear to auscultation bilaterally. Decreased breath sounds  Abdomen: Not obese  Skin: Warm, dry, no rashes.  Musculoskeletal: Gait is normal.  Moves all extremities well.    Extremities: normal range of motion all extremities.   Neurological: No tremors, sensation grossly intact, CN2-12 intact.  Psych:  Affect/mood is normal, judgement is good, memory is intact, grooming is appropriate.    Physical Exam  Lung sounds are slightly decreased.  Heart sounds are normal.       Results  Laboratory Studies  LDL cholesterol is 128, total cholesterol is 221. Thyroid testing, liver and kidney function are normal. Fasting sugar was 99.    Imaging  Chest x-ray showed a lung nodule. CT did not show any lung nodule but showed a hiatal hernia.   DX-CHEST-2 VIEWS  Order: [id number]   Status: Final result       Visible to patient: Yes (seen)       Next appt: None       Dx: Other chest pain    4 Result Notes  Details    Reading Physician Reading Date Result Priority   Hal Edward M.D.  [phone] 12/30/2024      Narrative & Impression     12/30/2024 8:12 AM     HISTORY/REASON FOR EXAM:  Chest Pain.  Chest pressure     TECHNIQUE/EXAM DESCRIPTION AND NUMBER OF VIEWS:  Two views of the chest.     COMPARISON:  None.     FINDINGS:  The heart is normal in size.  Possible infrahilar " nodule as demonstrated on the lateral projection.  No airspace consolidation.  Scattered tiny granulomas.  No pleural effusions.     IMPRESSION:     Possible infrahilar nodule. Recommend CT for further evaluation.   CT-CHEST (THORAX) W/O  Order: [id number]   Status: Final result       Visible to patient: Yes (seen)       Next appt: None       Dx: Lung nodule    1 Result Note       1 Patient Communication  Details    Reading Physician Reading Date Result Priority   Tr Taylor M.D.  [phone] 1/12/2025      Narrative & Impression     1/11/2025 10:43 AM     HISTORY/REASON FOR EXAM:  lung nodule seen on chest xray; lung nodule seen on recent chest xray.        TECHNIQUE/EXAM DESCRIPTION:  CT scan of the chest without contrast.     Noncontrast helical scanning of the chest was obtained from the lung apices through the adrenal glands.     Low dose optimization technique was utilized for this CT exam including automated exposure control and adjustment of the mA and/or kV according to patient size.     COMPARISON: 12/30/2024.     FINDINGS:  Lungs: No nodules or airspace process.     Airway: Patent     Pleura: No pleural effusion or pneumothorax..     Mediastinum/Colette: No significant adenopathy.     Heart and pericardium:  There is no coronary artery calcification. No pericardial effusion.     Thoracic aorta and great vessels:  No aneurysm.     Lymph nodes: No enlarged mediastinal or hilar lymph nodes.     Thoracic spine:  No fracture or malalignment. No compression deformity.     Chest wall:   Unremarkable.     Visualized upper abdomen:  Small hiatal hernia.     IMPRESSION:        1. No suspicious pulmonary nodule.     2. No airspace opacity. No pleural effusion. No pneumothorax.         Latest Reference Range & Units 01/06/25 08:19   Sodium 135 - 145 mmol/L 143   Potassium 3.6 - 5.5 mmol/L 3.7   Chloride 96 - 112 mmol/L 106   Co2 20 - 33 mmol/L 24   Anion Gap 7.0 - 16.0  13.0   Glucose 65 - 99 mg/dL 99   Bun 8 - 22  mg/dL 11   Creatinine 0.50 - 1.40 mg/dL 0.65   GFR (CKD-EPI) >60 mL/min/1.73 m 2 101   Calcium 8.5 - 10.5 mg/dL 9.1   Correct Calcium 8.5 - 10.5 mg/dL 8.8   AST(SGOT) 12 - 45 U/L 21   ALT(SGPT) 2 - 50 U/L 19   Alkaline Phosphatase 30 - 99 U/L 93   Total Bilirubin 0.1 - 1.5 mg/dL 0.6   Albumin 3.2 - 4.9 g/dL 4.4   Total Protein 6.0 - 8.2 g/dL 7.1   Globulin 1.9 - 3.5 g/dL 2.7   A-G Ratio g/dL 1.6   Fasting Status  Fasting   Cholesterol,Tot 100 - 199 mg/dL 221 (H)   Triglycerides 0 - 149 mg/dL 79   HDL >=40 mg/dL 77   LDL <100 mg/dL 128 (H)   TSH 0.380 - 5.330 uIU/mL 1.470   (H): Data is abnormally high    Assessment/Plan:     Jenna was seen today for follow-up.    Diagnoses and all orders for this visit:    Other chest pain  -     STRESS TEST  -     PULMONARY FUNCTION TESTS -Test requested: Spirometry with-out & with Bronchodilator; Future    Dyslipidemia  -     Lipid Profile; Future  -     Comp Metabolic Panel; Future    Hiatal hernia        Assessment & Plan  1. Chest pain.  The chest x-ray revealed a lung nodule; however, the subsequent CT scan did not confirm this finding. The CT scan did identify a hiatal hernia, which could potentially be causing acid reflux symptoms. Her fasting glucose level is at the upper limit of normal, and her kidney and liver functions are within normal ranges. Her total cholesterol level has decreased slightly from the previous measurement taken 9 months ago, with an improvement in her LDL levels from 144 to 128. Her thyroid function is also normal. Given these findings, there is no immediate need for surgical intervention for the hiatal hernia. A stress test will be ordered to further evaluate her cardiac function. Additionally, a pulmonary function test will be conducted to assess her lung capacity. Basic labs, including a lipid panel and metabolic tests, will be ordered to monitor her cholesterol levels over the next 6 to 9 months. She will be contacted by the cardiology and  pulmonology departments to schedule these tests. If she does not receive a call by Wednesday of the following week, she is advised to inform us so that we can follow up on the scheduling.    2.  Dyslipidemia  Repeat labs in 6-9 months    3.  Hiatal hernia  Potential reason for pain.  Patient not having pain at this time.  Continue to monitor.      Return in about 6 months (around 8/6/2025), or if symptoms worsen or fail to improve, for pap smear at patient convenience.  .    Please note that this dictation was created using voice recognition software. I have made every reasonable attempt to correct obvious errors, but expect that there are errors of grammar and possible content that I did not discover before finalizing note.

## 2025-02-11 ENCOUNTER — HOSPITAL ENCOUNTER (OUTPATIENT)
Dept: LAB | Facility: MEDICAL CENTER | Age: 60
End: 2025-02-11
Payer: COMMERCIAL

## 2025-02-11 LAB
BASOPHILS # BLD AUTO: 0.7 % (ref 0–1.8)
BASOPHILS # BLD: 0.04 K/UL (ref 0–0.12)
EOSINOPHIL # BLD AUTO: 0.12 K/UL (ref 0–0.51)
EOSINOPHIL NFR BLD: 2 % (ref 0–6.9)
ERYTHROCYTE [DISTWIDTH] IN BLOOD BY AUTOMATED COUNT: 42.6 FL (ref 35.9–50)
HCT VFR BLD AUTO: 39.3 % (ref 37–47)
HGB BLD-MCNC: 13.2 G/DL (ref 12–16)
IMM GRANULOCYTES # BLD AUTO: 0.01 K/UL (ref 0–0.11)
IMM GRANULOCYTES NFR BLD AUTO: 0.2 % (ref 0–0.9)
LYMPHOCYTES # BLD AUTO: 2.61 K/UL (ref 1–4.8)
LYMPHOCYTES NFR BLD: 42.6 % (ref 22–41)
MCH RBC QN AUTO: 31.4 PG (ref 27–33)
MCHC RBC AUTO-ENTMCNC: 33.6 G/DL (ref 32.2–35.5)
MCV RBC AUTO: 93.3 FL (ref 81.4–97.8)
MONOCYTES # BLD AUTO: 0.45 K/UL (ref 0–0.85)
MONOCYTES NFR BLD AUTO: 7.4 % (ref 0–13.4)
NEUTROPHILS # BLD AUTO: 2.89 K/UL (ref 1.82–7.42)
NEUTROPHILS NFR BLD: 47.1 % (ref 44–72)
NRBC # BLD AUTO: 0 K/UL
NRBC BLD-RTO: 0 /100 WBC (ref 0–0.2)
PLATELET # BLD AUTO: 223 K/UL (ref 164–446)
PMV BLD AUTO: 11 FL (ref 9–12.9)
RBC # BLD AUTO: 4.21 M/UL (ref 4.2–5.4)
WBC # BLD AUTO: 6.1 K/UL (ref 4.8–10.8)

## 2025-02-11 PROCEDURE — 85025 COMPLETE CBC W/AUTO DIFF WBC: CPT

## 2025-02-11 PROCEDURE — 83550 IRON BINDING TEST: CPT

## 2025-02-11 PROCEDURE — 82728 ASSAY OF FERRITIN: CPT

## 2025-02-11 PROCEDURE — 36415 COLL VENOUS BLD VENIPUNCTURE: CPT

## 2025-02-11 PROCEDURE — 83540 ASSAY OF IRON: CPT

## 2025-02-12 LAB
FERRITIN SERPL-MCNC: 193 NG/ML (ref 10–291)
IRON SATN MFR SERPL: 14 % (ref 15–55)
IRON SERPL-MCNC: 42 UG/DL (ref 40–170)
TIBC SERPL-MCNC: 298 UG/DL (ref 250–450)
UIBC SERPL-MCNC: 256 UG/DL (ref 110–370)

## 2025-02-22 DIAGNOSIS — N95.2 ATROPHIC VAGINITIS: ICD-10-CM

## 2025-02-25 NOTE — TELEPHONE ENCOUNTER
Received request via: Patient    Was the patient seen in the last year in this department? Yes    Does the patient have an active prescription (recently filled or refills available) for medication(s) requested? No    [email or web address]    Does the patient have [facility] Plus and need 100-day supply? (This applies to ALL medications) Patient does not have SCP

## 2025-02-26 ENCOUNTER — HOSPITAL ENCOUNTER (OUTPATIENT)
Facility: MEDICAL CENTER | Age: 60
End: 2025-02-26
Payer: COMMERCIAL

## 2025-02-26 LAB — HEMOCCULT STL QL: POSITIVE

## 2025-02-26 PROCEDURE — 82272 OCCULT BLD FECES 1-3 TESTS: CPT

## 2025-02-26 RX ORDER — ESTRADIOL 0.1 MG/G
CREAM VAGINAL
Qty: 42.5 G | Refills: 3 | Status: SHIPPED | OUTPATIENT
Start: 2025-02-26

## 2025-03-12 DIAGNOSIS — R07.89 OTHER CHEST PAIN: ICD-10-CM

## 2025-03-12 RX ORDER — ALBUTEROL SULFATE 90 UG/1
2 INHALANT RESPIRATORY (INHALATION) EVERY 4 HOURS PRN
Qty: 18 G | Refills: 2 | Status: SHIPPED | OUTPATIENT
Start: 2025-03-12

## 2025-03-12 NOTE — TELEPHONE ENCOUNTER
Received request via: Patient    Was the patient seen in the last year in this department? Yes    Does the patient have an active prescription (recently filled or refills available) for medication(s) requested?  yes    Pharmacy Name: [facility] in [place]    Does the patient have snf Plus and need 100-day supply? (This applies to ALL medications) Patient does not have SCP    Last Office Visit: 12/16/24  Last Labs: 2/12/25

## 2025-03-21 ENCOUNTER — HOSPITAL ENCOUNTER (OUTPATIENT)
Dept: RADIOLOGY | Facility: MEDICAL CENTER | Age: 60
End: 2025-03-21
Attending: PHYSICIAN ASSISTANT
Payer: COMMERCIAL

## 2025-03-21 DIAGNOSIS — R07.89 OTHER CHEST PAIN: ICD-10-CM

## 2025-03-21 PROCEDURE — 93018 CV STRESS TEST I&R ONLY: CPT | Performed by: INTERNAL MEDICINE

## 2025-03-21 PROCEDURE — 93017 CV STRESS TEST TRACING ONLY: CPT | Mod: TC

## 2025-03-25 ENCOUNTER — RESULTS FOLLOW-UP (OUTPATIENT)
Dept: MEDICAL GROUP | Facility: CLINIC | Age: 60
End: 2025-03-25
Payer: COMMERCIAL

## 2025-04-30 SDOH — HEALTH STABILITY: PHYSICAL HEALTH: ON AVERAGE, HOW MANY DAYS PER WEEK DO YOU ENGAGE IN MODERATE TO STRENUOUS EXERCISE (LIKE A BRISK WALK)?: 3 DAYS

## 2025-04-30 SDOH — HEALTH STABILITY: PHYSICAL HEALTH: ON AVERAGE, HOW MANY MINUTES DO YOU ENGAGE IN EXERCISE AT THIS LEVEL?: 40 MIN

## 2025-04-30 SDOH — ECONOMIC STABILITY: FOOD INSECURITY: WITHIN THE PAST 12 MONTHS, YOU WORRIED THAT YOUR FOOD WOULD RUN OUT BEFORE YOU GOT MONEY TO BUY MORE.: PATIENT DECLINED

## 2025-04-30 SDOH — ECONOMIC STABILITY: INCOME INSECURITY: IN THE LAST 12 MONTHS, WAS THERE A TIME WHEN YOU WERE NOT ABLE TO PAY THE MORTGAGE OR RENT ON TIME?: PATIENT DECLINED

## 2025-04-30 SDOH — ECONOMIC STABILITY: INCOME INSECURITY: HOW HARD IS IT FOR YOU TO PAY FOR THE VERY BASICS LIKE FOOD, HOUSING, MEDICAL CARE, AND HEATING?: PATIENT DECLINED

## 2025-04-30 SDOH — ECONOMIC STABILITY: FOOD INSECURITY: WITHIN THE PAST 12 MONTHS, THE FOOD YOU BOUGHT JUST DIDN'T LAST AND YOU DIDN'T HAVE MONEY TO GET MORE.: PATIENT DECLINED

## 2025-04-30 ASSESSMENT — LIFESTYLE VARIABLES
SKIP TO QUESTIONS 9-10: 1
HOW OFTEN DO YOU HAVE SIX OR MORE DRINKS ON ONE OCCASION: NEVER
HOW MANY STANDARD DRINKS CONTAINING ALCOHOL DO YOU HAVE ON A TYPICAL DAY: PATIENT DOES NOT DRINK
HOW OFTEN DO YOU HAVE A DRINK CONTAINING ALCOHOL: NEVER
AUDIT-C TOTAL SCORE: 0

## 2025-04-30 ASSESSMENT — SOCIAL DETERMINANTS OF HEALTH (SDOH)
DO YOU BELONG TO ANY CLUBS OR ORGANIZATIONS SUCH AS CHURCH GROUPS UNIONS, FRATERNAL OR ATHLETIC GROUPS, OR SCHOOL GROUPS?: NO
HOW HARD IS IT FOR YOU TO PAY FOR THE VERY BASICS LIKE FOOD, HOUSING, MEDICAL CARE, AND HEATING?: PATIENT DECLINED
IN A TYPICAL WEEK, HOW MANY TIMES DO YOU TALK ON THE PHONE WITH FAMILY, FRIENDS, OR NEIGHBORS?: MORE THAN THREE TIMES A WEEK
HOW OFTEN DO YOU ATTEND CHURCH OR RELIGIOUS SERVICES?: PATIENT DECLINED
HOW MANY DRINKS CONTAINING ALCOHOL DO YOU HAVE ON A TYPICAL DAY WHEN YOU ARE DRINKING: PATIENT DOES NOT DRINK
HOW OFTEN DO YOU ATTEND CHURCH OR RELIGIOUS SERVICES?: PATIENT DECLINED
HOW OFTEN DO YOU ATTENT MEETINGS OF THE CLUB OR ORGANIZATION YOU BELONG TO?: NEVER
HOW OFTEN DO YOU HAVE SIX OR MORE DRINKS ON ONE OCCASION: NEVER
DO YOU BELONG TO ANY CLUBS OR ORGANIZATIONS SUCH AS CHURCH GROUPS UNIONS, FRATERNAL OR ATHLETIC GROUPS, OR SCHOOL GROUPS?: NO
HOW OFTEN DO YOU GET TOGETHER WITH FRIENDS OR RELATIVES?: PATIENT DECLINED
IN THE PAST 12 MONTHS, HAS THE ELECTRIC, GAS, OIL, OR WATER COMPANY THREATENED TO SHUT OFF SERVICE IN YOUR HOME?: PATIENT DECLINED
IN A TYPICAL WEEK, HOW MANY TIMES DO YOU TALK ON THE PHONE WITH FAMILY, FRIENDS, OR NEIGHBORS?: MORE THAN THREE TIMES A WEEK
WITHIN THE PAST 12 MONTHS, YOU WORRIED THAT YOUR FOOD WOULD RUN OUT BEFORE YOU GOT THE MONEY TO BUY MORE: PATIENT DECLINED
HOW OFTEN DO YOU ATTENT MEETINGS OF THE CLUB OR ORGANIZATION YOU BELONG TO?: NEVER
HOW OFTEN DO YOU HAVE A DRINK CONTAINING ALCOHOL: NEVER
HOW OFTEN DO YOU GET TOGETHER WITH FRIENDS OR RELATIVES?: PATIENT DECLINED

## 2025-05-01 ENCOUNTER — OFFICE VISIT (OUTPATIENT)
Dept: MEDICAL GROUP | Facility: CLINIC | Age: 60
End: 2025-05-01
Payer: COMMERCIAL

## 2025-05-01 ENCOUNTER — HOSPITAL ENCOUNTER (OUTPATIENT)
Facility: MEDICAL CENTER | Age: 60
End: 2025-05-01
Attending: PHYSICIAN ASSISTANT
Payer: COMMERCIAL

## 2025-05-01 VITALS
DIASTOLIC BLOOD PRESSURE: 64 MMHG | SYSTOLIC BLOOD PRESSURE: 116 MMHG | HEIGHT: 58 IN | HEART RATE: 56 BPM | OXYGEN SATURATION: 98 % | BODY MASS INDEX: 26.1 KG/M2 | WEIGHT: 124.34 LBS | TEMPERATURE: 97.9 F | RESPIRATION RATE: 16 BRPM

## 2025-05-01 DIAGNOSIS — Z12.4 PAP SMEAR FOR CERVICAL CANCER SCREENING: ICD-10-CM

## 2025-05-01 DIAGNOSIS — N95.2 ATROPHIC VAGINITIS: ICD-10-CM

## 2025-05-01 PROCEDURE — 87624 HPV HI-RISK TYP POOLED RSLT: CPT

## 2025-05-01 PROCEDURE — 99000 SPECIMEN HANDLING OFFICE-LAB: CPT | Performed by: PHYSICIAN ASSISTANT

## 2025-05-01 PROCEDURE — 99396 PREV VISIT EST AGE 40-64: CPT | Performed by: PHYSICIAN ASSISTANT

## 2025-05-01 PROCEDURE — 3078F DIAST BP <80 MM HG: CPT | Performed by: PHYSICIAN ASSISTANT

## 2025-05-01 PROCEDURE — 88142 CYTOPATH C/V THIN LAYER: CPT

## 2025-05-01 PROCEDURE — 3074F SYST BP LT 130 MM HG: CPT | Performed by: PHYSICIAN ASSISTANT

## 2025-05-01 PROCEDURE — 87491 CHLMYD TRACH DNA AMP PROBE: CPT

## 2025-05-01 PROCEDURE — 87591 N.GONORRHOEAE DNA AMP PROB: CPT

## 2025-05-01 ASSESSMENT — FIBROSIS 4 INDEX: FIB4 SCORE: 1.27

## 2025-05-01 NOTE — PROGRESS NOTES
"cc:  pap    Subjective:     Jenna Real is a 59 y.o. female presenting for pap.  Patient presents the office today to proceed with Pap and breast exam.  She will make a follow-up appointment to discuss her stress test and pulmonary function test further.  She denies any other symptoms or issues at this time.  At patient request  is in room with patient.      Review of systems:  See above.   Denies any symptoms unless previously indicated.        Current Outpatient Medications:     estradiol (ESTRACE) 0.1 MG/GM vaginal cream, INSERT 1 GRAM VAGINALLY EVERY EVENING X2WEEKS THEN DECREASE TO 1 GRAM 3 TIMES WEEKLY THEREAFTER, Disp: 42.5 g, Rfl: 3    metronidazole (METROCREAM) 0.75 % cream, Apply 1 Application topically 2 times a day., Disp: , Rfl:     cetirizine (ZYRTEC) 10 MG Tab, Take 1 Tablet by mouth every day., Disp: , Rfl:     vitamin D (CHOLECALCIFEROL) 1000 UNIT Tab, Take 1 Tablet by mouth every day., Disp: , Rfl:     Multiple Vitamins-Calcium (ONE-A-DAY WOMENS PO), Take  by mouth., Disp: , Rfl:     CALCIUM 500 500 MG TABS, Take 1 Tablet by mouth 2 times a day with meals., Disp: , Rfl:     albuterol 108 (90 Base) MCG/ACT Aero Soln inhalation aerosol, INHALE 2 PUFFS BY MOUTH EVERY 4 HOURS AS NEEDED FOR SHORTNESS OF BREATH (Patient not taking: Reported on 5/1/2025), Disp: 18 g, Rfl: 2    azelastine (ASTELIN) 137 MCG/SPRAY nasal spray, Administer 1 Spray into affected nostril(S) 2 times a day. (Patient not taking: Reported on 5/1/2025), Disp: 30 mL, Rfl: 1    Allergies, past medical history, past surgical history, family history, social history reviewed and updated    Objective:     Vitals: /64 (BP Location: Left arm, Patient Position: Sitting, BP Cuff Size: Adult)   Pulse (!) 56   Temp 36.6 °C (97.9 °F) (Temporal)   Resp 16   Ht 1.473 m (4' 10\")   Wt 56.4 kg (124 lb 5.4 oz)   SpO2 98%   BMI 25.99 kg/m²   General: Alert, pleasant, NAD  EYES:   PERRL, EOMI, no icterus or pallor.  Conjunctivae " and lids normal.   HENT:  Normocephalic.  External ears normal.  Neck supple.     Respiratory: Normal respiratory effort.    Abdomen: Not obese  Pelvic exam: Normal external genitalia including urethral meatus.  Well supported, nontender urethra and bladder.  Vagina and cervix without significant discharge or lesions.  No cervical motion tenderness.  Pain with exam due to vaginal atrophy.  Anteflexed uterus of normal size, shape, and consistency.  No adnexal masses or tenderness.   Difficulty obtaining sample.  Cervix appears somewhat inferior.  Skin: Warm, dry, no rashes.  Musculoskeletal: Gait is normal.  Moves all extremities well.    Neurological: No tremors, sensation grossly intact,  CN2-12 intact.  Psych:  Affect/mood is normal, judgement is good, memory is intact, grooming is appropriate.    Assessment/Plan:     Jenna was seen today for gynecologic exam.    Diagnoses and all orders for this visit:    Pap smear for cervical cancer screening  -     THINPREP PAP W/HPV AND CTNG; Future    Atrophic vaginitis        Cervical specimen collected will be sent to the lab for analysis.  Will notify patient of results when received.    Patient has been scheduled for May 6 to discuss cardiac stress test and pulmonary function test.    Return for follow up as soon as able to discuss spirometry results. .    Please note that this dictation was created using voice recognition software. I have made every reasonable attempt to correct obvious errors, but expect that there are errors of grammar and possible content that I did not discover before finalizing note. Breasts:  Symmetrical, nontender, without masses or nipple discharge.

## 2025-05-02 LAB
C TRACH DNA GENITAL QL NAA+PROBE: NEGATIVE
N GONORRHOEA DNA GENITAL QL NAA+PROBE: NEGATIVE
SPECIMEN SOURCE: NORMAL

## 2025-05-05 ENCOUNTER — RESULTS FOLLOW-UP (OUTPATIENT)
Dept: MEDICAL GROUP | Facility: CLINIC | Age: 60
End: 2025-05-05

## 2025-05-06 ENCOUNTER — OFFICE VISIT (OUTPATIENT)
Dept: MEDICAL GROUP | Facility: CLINIC | Age: 60
End: 2025-05-06
Payer: COMMERCIAL

## 2025-05-06 VITALS
RESPIRATION RATE: 16 BRPM | TEMPERATURE: 97.4 F | HEART RATE: 60 BPM | SYSTOLIC BLOOD PRESSURE: 112 MMHG | WEIGHT: 124.12 LBS | HEIGHT: 58 IN | BODY MASS INDEX: 26.05 KG/M2 | OXYGEN SATURATION: 99 % | DIASTOLIC BLOOD PRESSURE: 70 MMHG

## 2025-05-06 DIAGNOSIS — J30.2 SEASONAL ALLERGIES: ICD-10-CM

## 2025-05-06 DIAGNOSIS — J45.20 MILD INTERMITTENT ASTHMA WITHOUT COMPLICATION: ICD-10-CM

## 2025-05-06 DIAGNOSIS — D50.8 OTHER IRON DEFICIENCY ANEMIA: ICD-10-CM

## 2025-05-06 PROBLEM — D50.9 IRON DEFICIENCY ANEMIA: Status: ACTIVE | Noted: 2025-05-06

## 2025-05-06 PROCEDURE — 3074F SYST BP LT 130 MM HG: CPT | Performed by: PHYSICIAN ASSISTANT

## 2025-05-06 PROCEDURE — 99213 OFFICE O/P EST LOW 20 MIN: CPT | Performed by: PHYSICIAN ASSISTANT

## 2025-05-06 PROCEDURE — 3078F DIAST BP <80 MM HG: CPT | Performed by: PHYSICIAN ASSISTANT

## 2025-05-06 RX ORDER — MONTELUKAST SODIUM 10 MG/1
10 TABLET ORAL DAILY
Qty: 30 TABLET | Refills: 0 | Status: SHIPPED | OUTPATIENT
Start: 2025-05-06 | End: 2025-05-08 | Stop reason: SDUPTHER

## 2025-05-06 RX ORDER — FERROUS SULFATE 325(65) MG
325 TABLET ORAL DAILY
Qty: 30 TABLET | Refills: 1 | Status: SHIPPED | OUTPATIENT
Start: 2025-05-06

## 2025-05-06 ASSESSMENT — FIBROSIS 4 INDEX: FIB4 SCORE: 1.27

## 2025-05-07 NOTE — PROGRESS NOTES
cc:  pulmonary function     Subjective:     Jenna Real is a 59 y.o. female presenting for pulmonary function        History of Present Illness  The patient presents to the office today to follow up on her spirometry results, which revealed mild obstructive dysfunction with no significant improvement using an albuterol nebulizer. She also had labs drawn, showing a mildly elevated lymphocyte count and iron deficiency anemia with a percent saturation of 14. Additionally, she had a positive fecal occult test.    She reports that the fecal occult test was ordered by [facility] due to ongoing issues. A colonoscopy was performed, revealing a benign polyp and stage 2 internal hemorrhoids. Despite the treatment of 3 hemorrhoids, she continues to experience bleeding. She is currently awaiting further information regarding the management of her remaining hemorrhoids.    She has not utilized the albuterol nebulizer as she does not experience shortness of breath. She maintains an active lifestyle, including daily treadmill use, without any respiratory difficulties. The spirometry test was conducted in 02/2025 due to concerns about chest pain and decreased volume in the left lung. She recalls an episode of chest discomfort that resolved spontaneously.    She has a history of allergies, [place] She has been managing her symptoms with local honey, which has provided some relief. Her symptoms are typically worse at night, necessitating her to sleep in an upright position. However, she has not experienced this severity in recent years. She has been taking Zyrtec for her allergies, which are present for approximately 3 months each year. The efficacy of Zyrtec varies, sometimes providing relief and other times being ineffective. She has not previously tried Singulair or montelukast.       Review of systems:  See above.   Denies any symptoms unless previously indicated.   "      Current Outpatient Medications:     montelukast (SINGULAIR) 10 MG Tab, Take 1 Tablet by mouth every day., Disp: 30 Tablet, Rfl: 0    ferrous sulfate 325 (65 Fe) MG tablet, Take 1 Tablet by mouth every day., Disp: 30 Tablet, Rfl: 1    estradiol (ESTRACE) 0.1 MG/GM vaginal cream, INSERT 1 GRAM VAGINALLY EVERY EVENING X2WEEKS THEN DECREASE TO 1 GRAM 3 TIMES WEEKLY THEREAFTER, Disp: 42.5 g, Rfl: 3    metronidazole (METROCREAM) 0.75 % cream, Apply 1 Application topically 2 times a day., Disp: , Rfl:     cetirizine (ZYRTEC) 10 MG Tab, Take 1 Tablet by mouth every day., Disp: , Rfl:     vitamin D (CHOLECALCIFEROL) 1000 UNIT Tab, Take 1 Tablet by mouth every day., Disp: , Rfl:     Multiple Vitamins-Calcium (ONE-A-DAY WOMENS PO), Take  by mouth., Disp: , Rfl:     CALCIUM 500 500 MG TABS, Take 1 Tablet by mouth 2 times a day with meals., Disp: , Rfl:     albuterol 108 (90 Base) MCG/ACT Aero Soln inhalation aerosol, INHALE 2 PUFFS BY MOUTH EVERY 4 HOURS AS NEEDED FOR SHORTNESS OF BREATH (Patient not taking: Reported on 5/6/2025), Disp: 18 g, Rfl: 2    azelastine (ASTELIN) 137 MCG/SPRAY nasal spray, Administer 1 Spray into affected nostril(S) 2 times a day. (Patient not taking: Reported on 2/6/2025), Disp: 30 mL, Rfl: 1    Allergies, past medical history, past surgical history, family history, social history reviewed and updated    Objective:     Vitals: /70 (BP Location: Left arm, Patient Position: Sitting, BP Cuff Size: Adult)   Pulse 60   Temp 36.3 °C (97.4 °F) (Temporal)   Resp 16   Ht 1.473 m (4' 10\")   Wt 56.3 kg (124 lb 1.9 oz)   SpO2 99%   BMI 25.94 kg/m²   General: Alert, pleasant, NAD  EYES:   PERRL, EOMI, no icterus or pallor.  Conjunctivae and lids normal.   HENT:  Normocephalic.  External ears normal.  Neck supple.     Respiratory: Normal respiratory effort.   Abdomen: Not obese  Skin: Warm, dry, no rashes.  Musculoskeletal: Gait is normal.  Moves all extremities well.    Neurological: No " tremors, sensation grossly intact,  CN2-12 intact.  Psych:  Affect/mood is normal, judgement is good, memory is intact, grooming is appropriate.    Physical Exam  Rectal: Grade II internal hemorrhoids       Results  Labs   - Lymphocyte count: Mildly elevated   - Iron deficiency anemia: Percent saturation of 14   - Fecal occult test: Positive    Imaging   - Colonoscopy: Benign polyp and grade/stage II internal hemorrhoids    Diagnostic Testing   - Spirometry: Mild obstructive dysfunction  Spirometry with Bronchodilator    Narrative    Pulmonary Function Testing      MRN: [id number]  Patient: Jenna Real  : 1965  Procedure Date: 2025  Referring Provider: Ingrid Aleman  Reason For Exam: Chest Pain    25  SPIROMETRY: Spirometry reveals mild obstructive ventilatory dysfunction  (OVD). FEV1 = 1.62, 93% predicted.  BRONCHODILATOR RESPONSE: Following the administration of nebulized  Albuterol 2.5 mg: There is no significant improvement in the FEV1 or FVC.  FLOW VOLUME LOOP:  The shape of the flow volume loop is consistent with  OVD.      Latest Reference Range & Units 25 14:07 25 05:00   WBC 4.8 - 10.8 K/uL 6.1    RBC 4.20 - 5.40 M/uL 4.21    Hemoglobin 12.0 - 16.0 g/dL 13.2    Hematocrit 37.0 - 47.0 % 39.3    MCV 81.4 - 97.8 fL 93.3    MCH 27.0 - 33.0 pg 31.4    MCHC 32.2 - 35.5 g/dL 33.6    RDW 35.9 - 50.0 fL 42.6    Platelet Count 164 - 446 K/uL 223    MPV 9.0 - 12.9 fL 11.0    Neutrophils-Polys 44.00 - 72.00 % 47.10    Neutrophils (Absolute) 1.82 - 7.42 K/uL 2.89    Lymphocytes 22.00 - 41.00 % 42.60 (H)    Lymphs (Absolute) 1.00 - 4.80 K/uL 2.61    Monocytes 0.00 - 13.40 % 7.40    Monos (Absolute) 0.00 - 0.85 K/uL 0.45    Eosinophils 0.00 - 6.90 % 2.00    Eos (Absolute) 0.00 - 0.51 K/uL 0.12    Basophils 0.00 - 1.80 % 0.70    Baso (Absolute) 0.00 - 0.12 K/uL 0.04    Immature Granulocytes 0.00 - 0.90 % 0.20    Immature Granulocytes (abs) 0.00 - 0.11 K/uL 0.01     Nucleated RBC 0.00 - 0.20 /100 WBC 0.00    NRBC (Absolute) K/uL 0.00    Iron 40 - 170 ug/dL 42    Total Iron Binding 250 - 450 ug/dL 298    % Saturation 15 - 55 % 14 (L)    Unsat Iron Binding 110 - 370 ug/dL 256    Occult Blood Feces Negative   Positive !   Ferritin 10.0 - 291.0 ng/mL 193.0    (H): Data is abnormally high  (L): Data is abnormally low  !: Data is abnormal    Assessment/Plan:     Jenna was seen today for results.    Diagnoses and all orders for this visit:    Mild intermittent asthma without complication  -     montelukast (SINGULAIR) 10 MG Tab; Take 1 Tablet by mouth every day.    Other iron deficiency anemia  -     ferrous sulfate 325 (65 Fe) MG tablet; Take 1 Tablet by mouth every day.    Seasonal allergies        Assessment & Plan  1. Iron deficiency anemia.  - Iron saturation is at 14%, which is below the normal range of at least 15%.  - Hemorrhoids, particularly if bleeding, could be contributing to the anemia.  - Ferritin levels are within the normal range.  - Prescription for an iron supplement will be provided, to be taken every other day due to potential gastrointestinal side effects such as nausea. Medication sent to pharmacy.    2. Mild intermittent asthma.  - Spirometry results reveal a mild obstructive pattern, suggesting possible asthma.  - Chest pain could be a manifestation of unrecognized breathing difficulties.  - No significant change observed with albuterol nebulizer.  - Advised to use the albuterol inhaler during episodes of chest pain to assess effectiveness. If beneficial, it will be continued; if not, alternative treatments will be considered.    3. Seasonal allergies.  - History of allergies suggests possible allergy-induced asthma.  - Trial of Singulair (montelukast) for 2 weeks will be initiated to manage allergies.  - Provided a 30-day supply and informed of potential side effect of depression.  - If no improvement in symptoms after 2 weeks, the medication will be  discontinued.    Follow-up  - Follow-up appointment scheduled in 3 months.    Return in about 3 months (around 8/6/2025), or if symptoms worsen or fail to improve, for follow up labs.    Please note that this dictation was created using voice recognition software. I have made every reasonable attempt to correct obvious errors, but expect that there are errors of grammar and possible content that I did not discover before finalizing note.

## 2025-05-08 DIAGNOSIS — J45.20 MILD INTERMITTENT ASTHMA WITHOUT COMPLICATION: ICD-10-CM

## 2025-05-08 RX ORDER — MONTELUKAST SODIUM 10 MG/1
10 TABLET ORAL DAILY
Qty: 30 TABLET | Refills: 0 | Status: SHIPPED | OUTPATIENT
Start: 2025-05-08

## 2025-05-08 NOTE — TELEPHONE ENCOUNTER
Requested Prescriptions     Pending Prescriptions Disp Refills    montelukast (SINGULAIR) 10 MG Tab 30 Tablet 0     Sig: Take 1 Tablet by mouth every day.       Insurance requires 90 day supply

## 2025-05-14 LAB
HPV I/H RISK 1 DNA SPEC QL NAA+PROBE: NOT DETECTED
SPECIMEN SOURCE: NORMAL
THINPREP PAP, CYTOLOGY NL11781: NORMAL

## 2025-07-28 ENCOUNTER — HOSPITAL ENCOUNTER (OUTPATIENT)
Dept: LAB | Facility: MEDICAL CENTER | Age: 60
End: 2025-07-28
Attending: PHYSICIAN ASSISTANT
Payer: COMMERCIAL

## 2025-07-28 DIAGNOSIS — E78.5 DYSLIPIDEMIA: ICD-10-CM

## 2025-07-28 LAB
ALBUMIN SERPL BCP-MCNC: 4.5 G/DL (ref 3.2–4.9)
ALBUMIN/GLOB SERPL: 1.6 G/DL
ALP SERPL-CCNC: 86 U/L (ref 30–99)
ALT SERPL-CCNC: 33 U/L (ref 2–50)
ANION GAP SERPL CALC-SCNC: 15 MMOL/L (ref 7–16)
AST SERPL-CCNC: 35 U/L (ref 12–45)
BILIRUB SERPL-MCNC: 0.6 MG/DL (ref 0.1–1.5)
BUN SERPL-MCNC: 18 MG/DL (ref 8–22)
CALCIUM ALBUM COR SERPL-MCNC: 9.1 MG/DL (ref 8.5–10.5)
CALCIUM SERPL-MCNC: 9.5 MG/DL (ref 8.5–10.5)
CHLORIDE SERPL-SCNC: 105 MMOL/L (ref 96–112)
CHOLEST SERPL-MCNC: 264 MG/DL (ref 100–199)
CO2 SERPL-SCNC: 21 MMOL/L (ref 20–33)
CREAT SERPL-MCNC: 0.83 MG/DL (ref 0.5–1.4)
FASTING STATUS PATIENT QL REPORTED: NORMAL
GFR SERPLBLD CREATININE-BSD FMLA CKD-EPI: 81 ML/MIN/1.73 M 2
GLOBULIN SER CALC-MCNC: 2.8 G/DL (ref 1.9–3.5)
GLUCOSE SERPL-MCNC: 91 MG/DL (ref 65–99)
HDLC SERPL-MCNC: 79 MG/DL
LDLC SERPL CALC-MCNC: 174 MG/DL
POTASSIUM SERPL-SCNC: 3.8 MMOL/L (ref 3.6–5.5)
PROT SERPL-MCNC: 7.3 G/DL (ref 6–8.2)
SODIUM SERPL-SCNC: 141 MMOL/L (ref 135–145)
TRIGL SERPL-MCNC: 55 MG/DL (ref 0–149)

## 2025-07-28 PROCEDURE — 36415 COLL VENOUS BLD VENIPUNCTURE: CPT

## 2025-07-28 PROCEDURE — 80053 COMPREHEN METABOLIC PANEL: CPT

## 2025-07-28 PROCEDURE — 80061 LIPID PANEL: CPT

## 2025-08-05 DIAGNOSIS — J45.20 MILD INTERMITTENT ASTHMA WITHOUT COMPLICATION: ICD-10-CM

## 2025-08-05 RX ORDER — MONTELUKAST SODIUM 10 MG/1
10 TABLET ORAL DAILY
Qty: 90 TABLET | Refills: 2 | Status: SHIPPED | OUTPATIENT
Start: 2025-08-05 | End: 2025-08-06

## 2025-08-06 ENCOUNTER — OFFICE VISIT (OUTPATIENT)
Dept: MEDICAL GROUP | Facility: CLINIC | Age: 60
End: 2025-08-06
Payer: COMMERCIAL

## 2025-08-06 VITALS
RESPIRATION RATE: 18 BRPM | BODY MASS INDEX: 25.92 KG/M2 | SYSTOLIC BLOOD PRESSURE: 116 MMHG | TEMPERATURE: 97 F | HEIGHT: 58 IN | DIASTOLIC BLOOD PRESSURE: 64 MMHG | OXYGEN SATURATION: 98 % | WEIGHT: 123.46 LBS | HEART RATE: 65 BPM

## 2025-08-06 DIAGNOSIS — J30.2 SEASONAL ALLERGIES: ICD-10-CM

## 2025-08-06 DIAGNOSIS — L98.9 SKIN LESION: ICD-10-CM

## 2025-08-06 DIAGNOSIS — E78.2 MIXED HYPERLIPIDEMIA: Primary | ICD-10-CM

## 2025-08-06 PROBLEM — Z86.73 HISTORY OF CEREBROVASCULAR ACCIDENT (CVA) DUE TO ISCHEMIA: Status: ACTIVE | Noted: 2025-06-15

## 2025-08-06 PROBLEM — K92.1 MELENA: Status: ACTIVE | Noted: 2025-08-06

## 2025-08-06 PROBLEM — D12.5 BENIGN NEOPLASM OF SIGMOID COLON: Status: ACTIVE | Noted: 2019-12-31

## 2025-08-06 PROBLEM — K64.1 SECOND DEGREE HEMORRHOIDS: Status: ACTIVE | Noted: 2019-12-31

## 2025-08-06 PROBLEM — D12.6 ADENOMATOUS POLYP OF COLON: Status: ACTIVE | Noted: 2025-08-06

## 2025-08-06 PROBLEM — K22.89 OTHER SPECIFIED DISEASE OF ESOPHAGUS: Status: ACTIVE | Noted: 2025-04-16

## 2025-08-06 PROBLEM — K57.30 DVRTCLOS OF LG INT W/O PERFORATION OR ABSCESS W/O BLEEDING: Status: ACTIVE | Noted: 2019-12-31

## 2025-08-06 PROBLEM — K63.5 POLYP OF COLON: Status: ACTIVE | Noted: 2025-04-16

## 2025-08-06 PROBLEM — R19.5 DARK STOOLS: Status: ACTIVE | Noted: 2025-08-06

## 2025-08-06 PROBLEM — R19.5 OTHER FECAL ABNORMALITIES: Status: ACTIVE | Noted: 2025-08-06

## 2025-08-06 PROBLEM — L81.8 TATTOO OF SKIN: Status: ACTIVE | Noted: 2025-08-06

## 2025-08-06 PROBLEM — K92.2 GI BLEED: Status: ACTIVE | Noted: 2025-08-06

## 2025-08-06 PROCEDURE — 3074F SYST BP LT 130 MM HG: CPT | Performed by: PHYSICIAN ASSISTANT

## 2025-08-06 PROCEDURE — 99214 OFFICE O/P EST MOD 30 MIN: CPT | Performed by: PHYSICIAN ASSISTANT

## 2025-08-06 PROCEDURE — 3078F DIAST BP <80 MM HG: CPT | Performed by: PHYSICIAN ASSISTANT

## 2025-08-06 ASSESSMENT — FIBROSIS 4 INDEX: FIB4 SCORE: 1.61
